# Patient Record
Sex: FEMALE | Race: OTHER | HISPANIC OR LATINO | Employment: UNEMPLOYED | URBAN - METROPOLITAN AREA
[De-identification: names, ages, dates, MRNs, and addresses within clinical notes are randomized per-mention and may not be internally consistent; named-entity substitution may affect disease eponyms.]

---

## 2024-02-06 ENCOUNTER — HOSPITAL ENCOUNTER (EMERGENCY)
Facility: HOSPITAL | Age: 24
Discharge: HOME | End: 2024-02-06
Attending: FAMILY MEDICINE
Payer: MEDICAID

## 2024-02-06 VITALS
HEART RATE: 81 BPM | TEMPERATURE: 98 F | SYSTOLIC BLOOD PRESSURE: 141 MMHG | DIASTOLIC BLOOD PRESSURE: 99 MMHG | OXYGEN SATURATION: 100 % | RESPIRATION RATE: 17 BRPM | WEIGHT: 189.81 LBS | BODY MASS INDEX: 38.27 KG/M2 | HEIGHT: 59 IN

## 2024-02-06 DIAGNOSIS — O26.899 ABDOMINAL PAIN AFFECTING PREGNANCY: Primary | ICD-10-CM

## 2024-02-06 DIAGNOSIS — R10.9 ABDOMINAL PAIN AFFECTING PREGNANCY: Primary | ICD-10-CM

## 2024-02-06 LAB
ALBUMIN SERPL-MCNC: 3.4 G/DL (ref 3.5–5)
ALBUMIN/GLOB SERPL: 0.9 RATIO (ref 1.1–2)
ALP SERPL-CCNC: 88 UNIT/L (ref 40–150)
ALT SERPL-CCNC: 45 UNIT/L (ref 0–55)
APPEARANCE UR: CLEAR
AST SERPL-CCNC: 30 UNIT/L (ref 5–34)
B-HCG FREE SERPL-ACNC: ABNORMAL MIU/ML
BACTERIA #/AREA URNS AUTO: ABNORMAL /HPF
BASOPHILS # BLD AUTO: 0.03 X10(3)/MCL
BASOPHILS NFR BLD AUTO: 0.3 %
BILIRUB SERPL-MCNC: 0.2 MG/DL
BILIRUB UR QL STRIP.AUTO: NEGATIVE
BUN SERPL-MCNC: 12 MG/DL (ref 7–18.7)
C TRACH DNA SPEC QL NAA+PROBE: NOT DETECTED
CALCIUM SERPL-MCNC: 8.8 MG/DL (ref 8.4–10.2)
CHLORIDE SERPL-SCNC: 109 MMOL/L (ref 98–107)
CO2 SERPL-SCNC: 20 MMOL/L (ref 22–29)
COLOR UR AUTO: ABNORMAL
CREAT SERPL-MCNC: 0.6 MG/DL (ref 0.55–1.02)
EOSINOPHIL # BLD AUTO: 0.12 X10(3)/MCL (ref 0–0.9)
EOSINOPHIL NFR BLD AUTO: 1.3 %
ERYTHROCYTE [DISTWIDTH] IN BLOOD BY AUTOMATED COUNT: 11.9 % (ref 11.5–17)
GFR SERPLBLD CREATININE-BSD FMLA CKD-EPI: >60 MLS/MIN/1.73/M2
GLOBULIN SER-MCNC: 3.9 GM/DL (ref 2.4–3.5)
GLUCOSE SERPL-MCNC: 96 MG/DL (ref 74–100)
GLUCOSE UR QL STRIP.AUTO: NORMAL
HCT VFR BLD AUTO: 34.4 % (ref 37–47)
HGB BLD-MCNC: 12.2 G/DL (ref 12–16)
HOLD SPECIMEN: NORMAL
HOLD SPECIMEN: NORMAL
HYALINE CASTS #/AREA URNS LPF: ABNORMAL /LPF
IMM GRANULOCYTES # BLD AUTO: 0.03 X10(3)/MCL (ref 0–0.04)
IMM GRANULOCYTES NFR BLD AUTO: 0.3 %
KETONES UR QL STRIP.AUTO: NEGATIVE
LEUKOCYTE ESTERASE UR QL STRIP.AUTO: NEGATIVE
LYMPHOCYTES # BLD AUTO: 2.49 X10(3)/MCL (ref 0.6–4.6)
LYMPHOCYTES NFR BLD AUTO: 27.7 %
MCH RBC QN AUTO: 30.2 PG (ref 27–31)
MCHC RBC AUTO-ENTMCNC: 35.5 G/DL (ref 33–36)
MCV RBC AUTO: 85.1 FL (ref 80–94)
MONOCYTES # BLD AUTO: 0.75 X10(3)/MCL (ref 0.1–1.3)
MONOCYTES NFR BLD AUTO: 8.3 %
N GONORRHOEA DNA SPEC QL NAA+PROBE: NOT DETECTED
NEUTROPHILS # BLD AUTO: 5.57 X10(3)/MCL (ref 2.1–9.2)
NEUTROPHILS NFR BLD AUTO: 62.1 %
NITRITE UR QL STRIP.AUTO: NEGATIVE
NRBC BLD AUTO-RTO: 0 %
PH UR STRIP.AUTO: 6 [PH]
PLATELET # BLD AUTO: 326 X10(3)/MCL (ref 130–400)
PMV BLD AUTO: 9 FL (ref 7.4–10.4)
POTASSIUM SERPL-SCNC: 3.8 MMOL/L (ref 3.5–5.1)
PROT SERPL-MCNC: 7.3 GM/DL (ref 6.4–8.3)
PROT UR QL STRIP.AUTO: NEGATIVE
RBC # BLD AUTO: 4.04 X10(6)/MCL (ref 4.2–5.4)
RBC #/AREA URNS AUTO: ABNORMAL /HPF
RBC UR QL AUTO: NEGATIVE
SODIUM SERPL-SCNC: 138 MMOL/L (ref 136–145)
SOURCE (OHS): NORMAL
SP GR UR STRIP.AUTO: 1.02 (ref 1–1.03)
SQUAMOUS #/AREA URNS LPF: ABNORMAL /HPF
UROBILINOGEN UR STRIP-ACNC: NORMAL
WBC # SPEC AUTO: 8.99 X10(3)/MCL (ref 4.5–11.5)
WBC #/AREA URNS AUTO: ABNORMAL /HPF

## 2024-02-06 PROCEDURE — 99284 EMERGENCY DEPT VISIT MOD MDM: CPT | Mod: 25

## 2024-02-06 PROCEDURE — 80053 COMPREHEN METABOLIC PANEL: CPT | Performed by: NURSE PRACTITIONER

## 2024-02-06 PROCEDURE — 81001 URINALYSIS AUTO W/SCOPE: CPT | Performed by: NURSE PRACTITIONER

## 2024-02-06 PROCEDURE — 87491 CHLMYD TRACH DNA AMP PROBE: CPT | Performed by: NURSE PRACTITIONER

## 2024-02-06 PROCEDURE — 85025 COMPLETE CBC W/AUTO DIFF WBC: CPT | Performed by: NURSE PRACTITIONER

## 2024-02-06 PROCEDURE — 84702 CHORIONIC GONADOTROPIN TEST: CPT | Performed by: NURSE PRACTITIONER

## 2024-02-06 RX ORDER — PYRIDOXINE HCL (VITAMIN B6) 25 MG
25 TABLET ORAL EVERY 8 HOURS PRN
Qty: 12 TABLET | Refills: 0 | Status: ON HOLD | OUTPATIENT
Start: 2024-02-06 | End: 2024-05-15 | Stop reason: HOSPADM

## 2024-02-06 RX ORDER — NIFEDIPINE 30 MG/1
30 TABLET, EXTENDED RELEASE ORAL DAILY
Status: ON HOLD | COMMUNITY
End: 2024-05-15 | Stop reason: HOSPADM

## 2024-02-07 NOTE — ED PROVIDER NOTES
Encounter Date: 2/6/2024       History     Chief Complaint   Patient presents with    abdominal pain and back pain during pregnancy     Patient  is 11 weeks  pregnant  for  twins.  Having  back pain and abdominal pain that started  this afternoon.  Does have a history of HTN and is on procardia. Does have a 5yr.old     The patient presents with occasional mild left mid abdominal pain for the last couple days. She also reports occasional mild nausea. She reports that she is pregnant with twins with her LMP in August of last year. She was seen in [place] with an ultrasound but has moved to [place] and does not have an OB provider. She denies vaginal bleeding, dysuria, fever, and chills. She is taking procardia prescribed in [place] for HTN. Video  used for encounter.      Review of patient's allergies indicates:  No Known Allergies  History reviewed. No pertinent past medical history.  History reviewed. No pertinent surgical history.  History reviewed. No pertinent family history.     Review of Systems   Constitutional:  Negative for fever.   HENT:  Negative for sore throat.    Respiratory:  Negative for shortness of breath.    Cardiovascular:  Negative for chest pain.   Gastrointestinal:  Positive for abdominal pain and nausea.   Genitourinary:  Negative for dysuria.   Musculoskeletal:  Negative for back pain.   Skin:  Negative for rash.   Neurological:  Negative for weakness.   Hematological:  Does not bruise/bleed easily.   All other systems reviewed and are negative.      Physical Exam     Initial Vitals [02/06/24 1937]   BP Pulse Resp Temp SpO2   (!) 148/97 86 20 97.9 °F (36.6 °C) 100 %      MAP       --         Physical Exam    Nursing note and vitals reviewed.  Constitutional: She appears well-developed and well-nourished.   HENT:   Head: Normocephalic and atraumatic.   Neck: Neck supple.   Normal range of motion.  Cardiovascular:  Normal rate, regular rhythm, normal heart sounds and intact  distal pulses.           Pulmonary/Chest: Effort normal and breath sounds normal.   Abdominal: Abdomen is soft and flat. Bowel sounds are normal. There is no abdominal tenderness.   Musculoskeletal:         General: Normal range of motion.      Cervical back: Normal range of motion and neck supple.     Neurological: She is alert. She has normal strength.   Skin: Skin is warm and dry.   Psychiatric: She has a normal mood and affect.         ED Course   Procedures  Labs Reviewed   COMPREHENSIVE METABOLIC PANEL - Abnormal; Notable for the following components:       Result Value    Chloride 109 (*)     Carbon Dioxide 20 (*)     Albumin Level 3.4 (*)     Globulin 3.9 (*)     Albumin/Globulin Ratio 0.9 (*)     All other components within normal limits   URINALYSIS, REFLEX TO URINE CULTURE - Abnormal; Notable for the following components:    Bacteria, UA Occ (*)     Squamous Epithelial Cells, UA Trace (*)     All other components within normal limits   HCG, QUANTITATIVE - Abnormal; Notable for the following components:    Beta Human Chorionic Gonadotropin Quantitative 164,388.82 (*)     All other components within normal limits   CBC WITH DIFFERENTIAL - Abnormal; Notable for the following components:    RBC 4.04 (*)     Hct 34.4 (*)     All other components within normal limits   CHLAMYDIA/GONORRHOEAE(GC), PCR    Narrative:     The Xpert CT/NG test, performed on the GeneXpert system is a qualitative in vitro real-time polymerase chain reaction (PCR) test for the automated detected and differentiation for genomic DNA from Chlamydia trachomatis (CT) and/or Neisseria gonorrhoeae (NG).   CBC W/ AUTO DIFFERENTIAL    Narrative:     The following orders were created for panel order CBC auto differential.  Procedure                               Abnormality         Status                     ---------                               -----------         ------                     CBC with Differential[[id number]]       Abnormal             Final result                 Please view results for these tests on the individual orders.   EXTRA TUBES    Narrative:     The following orders were created for panel order EXTRA TUBES.  Procedure                               Abnormality         Status                     ---------                               -----------         ------                     Light Blue Top Hold[[id number]]                             Final result               Gold Top Hold[[id number]]                                   Final result                 Please view results for these tests on the individual orders.   LIGHT BLUE TOP HOLD   GOLD TOP HOLD          Imaging Results              US OB <14 Wks, TransAbd,Twin Gestation (xpd) (Final result)  Result time 02/06/24 21:50:02   Procedure changed from US OB <14 Wks TransAbd & TransVag, Single Gestation (XPD)     Final result by Bret Lowe MD (02/06/24 21:50:02)                   Impression:      Twin viable intrauterine pregnancy.      Electronically signed by: Bret Lowe  Date:    02/06/2024  Time:    21:50               Narrative:    EXAMINATION:  US OB <14 WEEKS TRANSABDOM, TWIN GESTATION (XPD)    CLINICAL HISTORY:  abdominal pain;    TECHNIQUE:  Multiple real-time images were performed pelvis in various planes by the sonographer.    COMPARISON:  None available    FINDINGS:  Uterus measures 11.2 x 5.6 x 7.5 cm.  There is twin viable intrauterine pregnancy.    Baby A crown-rump length is 4.1 cm consistent with the mean age of 11 weeks and heart rate of 154 beats per minute.    Baby B crown-rump length is 4.1 cm again consistent with a mean age of 11 weeks and heart rate of 171 beats per minute.    No subchorionic hemorrhage identified.    Bilateral ovaries could not be visualized.  No free fluid within the pelvis                                       Medications - No data to display  Medical Decision Making  The patient presents with occasional mild left mid abdominal  pain for the last couple days. She also reports occasional mild nausea. She reports that she is pregnant with twins with her LMP in August of last year. She was seen in [place] with an ultrasound but has moved to [place] and does not have an OB provider. She denies vaginal bleeding, dysuria, fever, and chills. She is taking procardia prescribed in [place] for HTN. Video  used for encounter.      10:49 PM DISPOSITION: The patient is resting comfortably in no acute distress.  She is hemodynamically stable and is without objective evidence for acute process requiring urgent intervention or hospitalization. I provided counseling to patient with regard to condition, the treatment plan, specific conditions for return, and the importance of follow up. Detailed written and verbal instructions provided to patient and she expressed a verbal understanding of the discharge instructions and overall management plan. Reiterated the importance of medication administration and safety and advised patient to follow up with primary care provider in 3-5 days or sooner if needed.  Answered questions at this time. The patient is stable for discharge.       Amount and/or Complexity of Data Reviewed  Labs: ordered.  Radiology: ordered. Decision-making details documented in ED Course.               ED Course as of 02/07/24 0019 Tue Feb 06, 2024   2156  OB <14 Wks, TransAbd,Twin Gestation (xpd)  Impression:     Twin viable intrauterine pregnancy.   [RB]   2244 Beta HCG Quant(!): 164,388.82 [RB]      ED Course User Index  [RB] Balwinder Zeng ACNP                           Clinical Impression:  Final diagnoses:  [O26.899, R10.9] Abdominal pain affecting pregnancy (Primary)          ED Disposition Condition    Discharge Stable          ED Prescriptions       Medication Sig Dispense Start Date End Date Auth. Provider    pyridoxine, vitamin B6, (B-6) 25 MG Tab Take 1 tablet (25 mg total) by mouth every 8 (eight) hours as  needed (nausea). 12 tablet 2/6/2024 -- Balwinder Zeng ACNP          Follow-up Information       Follow up With Specialties Details Why Contact Info    referral sent to Family Medicine OB clinic        [facility] Emergency Medicine  If symptoms worsen [address] [id number] [phone]             Balwinder Zeng ACNP  02/07/24 0019

## 2024-02-07 NOTE — ED NOTES
Pt given discharge instructions pt instructed to return to er if any complications or any blurred vision, headache, dizziness, or chest pain   Alert-The patient is alert, awake and responds to voice. The patient is oriented to time, place, and person. The triage nurse is able to obtain subjective information.

## 2024-03-12 ENCOUNTER — HOSPITAL ENCOUNTER (OUTPATIENT)
Dept: RADIOLOGY | Facility: HOSPITAL | Age: 24
Discharge: HOME | End: 2024-03-12
Attending: OBSTETRICS & GYNECOLOGY
Payer: MEDICAID

## 2024-03-12 ENCOUNTER — OFFICE VISIT (OUTPATIENT)
Dept: FAMILY MEDICINE | Facility: CLINIC | Age: 24
End: 2024-03-12
Payer: MEDICAID

## 2024-03-12 VITALS
HEART RATE: 88 BPM | WEIGHT: 186.63 LBS | BODY MASS INDEX: 37.62 KG/M2 | RESPIRATION RATE: 18 BRPM | SYSTOLIC BLOOD PRESSURE: 129 MMHG | OXYGEN SATURATION: 100 % | DIASTOLIC BLOOD PRESSURE: 89 MMHG | HEIGHT: 59 IN | TEMPERATURE: 98 F

## 2024-03-12 DIAGNOSIS — O26.899 ABDOMINAL PAIN AFFECTING PREGNANCY: Primary | ICD-10-CM

## 2024-03-12 DIAGNOSIS — Z3A.16 16 WEEKS GESTATION OF PREGNANCY: ICD-10-CM

## 2024-03-12 DIAGNOSIS — Z34.90 PREGNANCY: ICD-10-CM

## 2024-03-12 DIAGNOSIS — O02.1 FETAL DEMISE BEFORE 20 WEEKS WITH RETENTION OF DEAD FETUS: ICD-10-CM

## 2024-03-12 DIAGNOSIS — R10.9 ABDOMINAL PAIN AFFECTING PREGNANCY: Primary | ICD-10-CM

## 2024-03-12 LAB
APPEARANCE UR: CLEAR
BACTERIA #/AREA URNS AUTO: ABNORMAL /HPF
BASOPHILS # BLD AUTO: 0.03 X10(3)/MCL
BASOPHILS NFR BLD AUTO: 0.4 %
BILIRUB SERPL-MCNC: NORMAL MG/DL
BILIRUB UR QL STRIP.AUTO: NEGATIVE
BLOOD URINE, POC: NORMAL
C TRACH DNA SPEC QL NAA+PROBE: NOT DETECTED
CLARITY, POC UA: CLEAR
COLOR UR AUTO: ABNORMAL
COLOR, POC UA: YELLOW
EOSINOPHIL # BLD AUTO: 0.11 X10(3)/MCL (ref 0–0.9)
EOSINOPHIL NFR BLD AUTO: 1.3 %
ERYTHROCYTE [DISTWIDTH] IN BLOOD BY AUTOMATED COUNT: 12.6 % (ref 11.5–17)
GLUCOSE UR QL STRIP.AUTO: NORMAL
GLUCOSE UR QL STRIP: NORMAL
GROUP & RH: NORMAL
HBV SURFACE AG SERPL QL IA: NONREACTIVE
HCT VFR BLD AUTO: 34 % (ref 37–47)
HCV AB SERPL QL IA: NONREACTIVE
HGB BLD-MCNC: 11.8 G/DL (ref 12–16)
HIV 1+2 AB+HIV1 P24 AG SERPL QL IA: NONREACTIVE
HYALINE CASTS #/AREA URNS LPF: ABNORMAL /LPF
IMM GRANULOCYTES # BLD AUTO: 0.03 X10(3)/MCL (ref 0–0.04)
IMM GRANULOCYTES NFR BLD AUTO: 0.4 %
INDIRECT COOMBS: NORMAL
KETONES UR QL STRIP.AUTO: NEGATIVE
KETONES UR QL STRIP: NORMAL
LEUKOCYTE ESTERASE UR QL STRIP.AUTO: NEGATIVE
LEUKOCYTE ESTERASE URINE, POC: NORMAL
LYMPHOCYTES # BLD AUTO: 1.84 X10(3)/MCL (ref 0.6–4.6)
LYMPHOCYTES NFR BLD AUTO: 22 %
MCH RBC QN AUTO: 29.9 PG (ref 27–31)
MCHC RBC AUTO-ENTMCNC: 34.7 G/DL (ref 33–36)
MCV RBC AUTO: 86.3 FL (ref 80–94)
MONOCYTES # BLD AUTO: 0.53 X10(3)/MCL (ref 0.1–1.3)
MONOCYTES NFR BLD AUTO: 6.3 %
MUCOUS THREADS URNS QL MICRO: ABNORMAL /LPF
N GONORRHOEA DNA SPEC QL NAA+PROBE: NOT DETECTED
NEUTROPHILS # BLD AUTO: 5.81 X10(3)/MCL (ref 2.1–9.2)
NEUTROPHILS NFR BLD AUTO: 69.6 %
NITRITE UR QL STRIP.AUTO: NEGATIVE
NITRITE, POC UA: NORMAL
NRBC BLD AUTO-RTO: 0 %
PH UR STRIP.AUTO: 7 [PH]
PH, POC UA: 7
PLATELET # BLD AUTO: 386 X10(3)/MCL (ref 130–400)
PMV BLD AUTO: 9.4 FL (ref 7.4–10.4)
PROT UR QL STRIP.AUTO: NEGATIVE
PROTEIN, POC: NORMAL
RBC # BLD AUTO: 3.94 X10(6)/MCL (ref 4.2–5.4)
RBC #/AREA URNS AUTO: ABNORMAL /HPF
RBC UR QL AUTO: NEGATIVE
SOURCE (OHS): NORMAL
SP GR UR STRIP.AUTO: 1.01 (ref 1–1.03)
SPECIFIC GRAVITY, POC UA: 1.01
SPECIMEN OUTDATE: NORMAL
SQUAMOUS #/AREA URNS LPF: ABNORMAL /HPF
T PALLIDUM AB SER QL: NONREACTIVE
UROBILINOGEN UR STRIP-ACNC: NORMAL
UROBILINOGEN, POC UA: 0.2
WBC # SPEC AUTO: 8.35 X10(3)/MCL (ref 4.5–11.5)
WBC #/AREA URNS AUTO: ABNORMAL /HPF

## 2024-03-12 PROCEDURE — 85025 COMPLETE CBC W/AUTO DIFF WBC: CPT

## 2024-03-12 PROCEDURE — 87086 URINE CULTURE/COLONY COUNT: CPT

## 2024-03-12 PROCEDURE — 86901 BLOOD TYPING SEROLOGIC RH(D): CPT

## 2024-03-12 PROCEDURE — 76810 OB US >/= 14 WKS ADDL FETUS: CPT | Mod: TC

## 2024-03-12 PROCEDURE — 87491 CHLMYD TRACH DNA AMP PROBE: CPT

## 2024-03-12 PROCEDURE — 81511 FTL CGEN ABNOR FOUR ANAL: CPT

## 2024-03-12 PROCEDURE — 81002 URINALYSIS NONAUTO W/O SCOPE: CPT | Mod: 59,PBBFAC

## 2024-03-12 PROCEDURE — 86780 TREPONEMA PALLIDUM: CPT

## 2024-03-12 PROCEDURE — 81001 URINALYSIS AUTO W/SCOPE: CPT

## 2024-03-12 PROCEDURE — 86762 RUBELLA ANTIBODY: CPT

## 2024-03-12 PROCEDURE — 86803 HEPATITIS C AB TEST: CPT

## 2024-03-12 PROCEDURE — 36415 COLL VENOUS BLD VENIPUNCTURE: CPT

## 2024-03-12 PROCEDURE — 85660 RBC SICKLE CELL TEST: CPT

## 2024-03-12 PROCEDURE — 88174 CYTOPATH C/V AUTO IN FLUID: CPT

## 2024-03-12 PROCEDURE — 99215 OFFICE O/P EST HI 40 MIN: CPT | Mod: PBBFAC,25

## 2024-03-12 PROCEDURE — 87389 HIV-1 AG W/HIV-1&-2 AB AG IA: CPT

## 2024-03-12 PROCEDURE — 87340 HEPATITIS B SURFACE AG IA: CPT

## 2024-03-12 PROCEDURE — 86787 VARICELLA-ZOSTER ANTIBODY: CPT

## 2024-03-12 NOTE — PATIENT INSTRUCTIONS
Well Child Exam    About this topic  A well child exam is a visit with your child's doctor to check your child's health. The doctor will check your child's growth, progress, and shot record. It is also a time for you to ask your child's doctor any questions you have about your child's health. Your child will have a full exam during the office visit. Other things that are sometimes checked are hearing, eyesight, and urine or blood tests. The doctor may give shots during your child's well visit.    General    Getting Ready for a Well Child Exam    A well child exam is a good time for you to talk with your child's doctor about any of these topics:    Eating habits or diet    How your child acts    Sleep issues    Growth    Safety    Vaccines    Toilet training    Teen years    How your child is doing in school or any learning concerns    Home life    You may want to make a written list of the things you want to talk about with your child's doctor. Be sure to bring your list of questions to your child's well visit. You may also want to do some research on your own before your office visit by reading books or looking at Web sites. Other family members, child caregivers, and grandparents may be able to help you too. Your child's doctor may ask also you about your family's health history or if your child is around anyone who smokes.    The Exam    The doctor measures your child's weight, height, and sometimes head size or body mass index (BMI). The doctor plots these numbers on a growth curve. The growth curve gives a picture of your baby's growth at each visit. The doctor may check your child's temperature, blood pressure, breathing, and heart rate. The doctor may listen to your child's heart, lungs, and belly. Your doctor will do a full exam of your child from the head to the toes.    Growth and Development Questions    Your doctor will ask you about your child's progress. The doctor will focus on the skills that are  likely to happen at your child's age. Some of these are motor skills like rolling over, walking, and running, while others are social skills, or how your child interacts with other people. Your child's doctor will also ask you how your child is doing in school.    Help for Parents    Your doctor will talk with you about any concerns you have about your child during this visit. The doctor may also talk with you about:    Getting family help or other support    Ways to help your child's brain growth    How your child plays and acts with others    Ways to help your child exercise    Safety    Eating habits    Vaccines    Quitting smoking    Help if you have a low mood after having a baby    Shots or Vaccines    It is important for your child to get shots on time. This protects from very serious illnesses like pertussis, measles, or some kinds of pneumonia. Sometimes, your child may need more than one dose of vaccine. The vaccines used today are safer than ever. Talk to your doctor if you have any questions or concerns about giving your child vaccines.    Well Child Exam Schedule    The American Academy of Pediatrics (AAP) suggests this plan for well child visits:    [place] (3 to 5 days old)    1 month old    2 months old    4 months old    6 months old    9 months old    12 months old    15 months old    18 months old    2 years old    30 months old    3 years old    4 years old    Once each year until age 21    Well child exams are very important. Since your child is healthy at this visit and it is scheduled ahead of time, you can think about things you want to ask your child's doctor. Be sure to follow the above plan for well child visits as well as any other visits your child's doctor suggests.    Where can I learn more?    Centers for Disease Control and Prevention    http://www.cdc.gov/vaccines     Healthy  Children    https://www.healthychildren.org/English/family-life/health-management/Pages/Well-Child-Care-A-Check-Up-for-Success.aspx    Disclaimer.  This generalized information is a limited summary of diagnosis, treatment, and/or medication information. It is not meant to be comprehensive and should be used as a tool to help the user understand and/or assess potential diagnostic and treatment options. It does NOT include all information about conditions, treatments, medications, side effects, or risks that may apply to a specific patient. It is not intended to be medical advice or a substitute for the medical advice, diagnosis, or treatment of a health care provider based on the health care provider's examination and assessment of a patients specific and unique circumstances. Patients must speak with a health care provider for complete information about their health, medical questions, and treatment options, including any risks or benefits regarding use of medications. This information does not endorse any treatments or medications as safe, effective, or approved for treating a specific patient. [employer] and its affiliates disclaim any warranty or liability relating to this information or the use thereof. The use of this information is governed by the Terms of Use, available at Terms of Use. ©2022 [employer] and its affiliates and/or licensors. All rights reserved.

## 2024-03-13 LAB — HGB S BLD QL SOLY: NEGATIVE

## 2024-03-13 NOTE — PROGRESS NOTES
Physically present during transvaginal ultrasound which revealed fetal demise.  Questionable structural abnormalities on ultrasound we will review previous images.  Had a long discussion with the patient and father of the babies via .  All questions were answered and discussed referral to Maternal-Fetal Medicine.      I have personally reviewed the review of systems (ROS) and past, family and social histories (PFSH) documented above by the resident.  I have reviewed the care furnished by the resident during the encounter, including a review of the patient's medical history, the resident's findings on physical examination, diagnosis, and the treatment plan.  I participated in the management of the patient and was immediately available throughout the encounter.   I was physically present during all key portions of the service(s) provided with the resident.  Services were furnished in a primary care center located in the outpatient department of a [facility].

## 2024-03-14 ENCOUNTER — OFFICE VISIT (OUTPATIENT)
Dept: MATERNAL FETAL MEDICINE | Facility: CLINIC | Age: 24
End: 2024-03-14
Payer: MEDICAID

## 2024-03-14 ENCOUNTER — PROCEDURE VISIT (OUTPATIENT)
Dept: MATERNAL FETAL MEDICINE | Facility: CLINIC | Age: 24
End: 2024-03-14
Payer: MEDICAID

## 2024-03-14 VITALS
HEART RATE: 97 BPM | BODY MASS INDEX: 37.68 KG/M2 | DIASTOLIC BLOOD PRESSURE: 89 MMHG | WEIGHT: 186.94 LBS | SYSTOLIC BLOOD PRESSURE: 124 MMHG | HEIGHT: 59 IN

## 2024-03-14 DIAGNOSIS — O41.02X2: ICD-10-CM

## 2024-03-14 DIAGNOSIS — O10.919 CHRONIC HYPERTENSION AFFECTING PREGNANCY: ICD-10-CM

## 2024-03-14 DIAGNOSIS — Z3A.16 16 WEEKS GESTATION OF PREGNANCY: ICD-10-CM

## 2024-03-14 DIAGNOSIS — O36.4XX0 FETAL DEMISE BEFORE 22 WEEKS WITH RETENTION OF DEAD FETUS: Primary | ICD-10-CM

## 2024-03-14 DIAGNOSIS — Z36.89 ENCOUNTER FOR FETAL ANATOMIC SURVEY: Primary | ICD-10-CM

## 2024-03-14 DIAGNOSIS — O30.002 TWIN GESTATION WITH UNKNOWN NUMBER OF PLACENTAS AND AMNIOTIC SACS IN SECOND TRIMESTER: ICD-10-CM

## 2024-03-14 DIAGNOSIS — O99.212 OTHER OBESITY DUE TO EXCESS CALORIES AFFECTING PREGNANCY IN SECOND TRIMESTER: ICD-10-CM

## 2024-03-14 DIAGNOSIS — O36.4XX0 FETAL DEMISE BEFORE 22 WEEKS WITH RETENTION OF DEAD FETUS: ICD-10-CM

## 2024-03-14 DIAGNOSIS — E66.09 OTHER OBESITY DUE TO EXCESS CALORIES AFFECTING PREGNANCY IN SECOND TRIMESTER: ICD-10-CM

## 2024-03-14 PROBLEM — O30.032 MONOCHORIONIC DIAMNIOTIC TWIN GESTATION IN SECOND TRIMESTER: Status: ACTIVE | Noted: 2024-03-14

## 2024-03-14 LAB
BACTERIA UR CULT: NO GROWTH
RUBV IGG SERPL IA-ACNC: 4.1
RUBV IGG SERPL QL IA: POSITIVE
VZV IGG SER IA-ACNC: 2.3
VZV IGG SER QL IA: POSITIVE

## 2024-03-14 PROCEDURE — 1159F MED LIST DOCD IN RCRD: CPT | Mod: CPTII,S$GLB,, | Performed by: OBSTETRICS & GYNECOLOGY

## 2024-03-14 PROCEDURE — 1160F RVW MEDS BY RX/DR IN RCRD: CPT | Mod: CPTII,S$GLB,, | Performed by: OBSTETRICS & GYNECOLOGY

## 2024-03-14 PROCEDURE — 76812 OB US DETAILED ADDL FETUS: CPT | Mod: S$GLB,,, | Performed by: OBSTETRICS & GYNECOLOGY

## 2024-03-14 PROCEDURE — 3008F BODY MASS INDEX DOCD: CPT | Mod: CPTII,S$GLB,, | Performed by: OBSTETRICS & GYNECOLOGY

## 2024-03-14 PROCEDURE — 3079F DIAST BP 80-89 MM HG: CPT | Mod: CPTII,S$GLB,, | Performed by: OBSTETRICS & GYNECOLOGY

## 2024-03-14 PROCEDURE — 76811 OB US DETAILED SNGL FETUS: CPT | Mod: S$GLB,,, | Performed by: OBSTETRICS & GYNECOLOGY

## 2024-03-14 PROCEDURE — 99205 OFFICE O/P NEW HI 60 MIN: CPT | Mod: TH,S$GLB,, | Performed by: OBSTETRICS & GYNECOLOGY

## 2024-03-14 PROCEDURE — 3074F SYST BP LT 130 MM HG: CPT | Mod: CPTII,S$GLB,, | Performed by: OBSTETRICS & GYNECOLOGY
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
## 2024-03-14 NOTE — ASSESSMENT & PLAN NOTE
We discussed monochorionic twinning and reviewed the types of placentation seen and their frequency. I counseled her on the increased incidence of preeclampsia/gestational hypertension, gestational diabetes, fetal growth restriction, anemia, congenital anomalies, need for antepartum hospitalization,  labor/PPROM, stillbirth, and risk of postpartum hemorrhage that can occur in twin pregnancies. It was explained that all monochorionic twins, have a single placenta and that this puts them at risk for twin transfusion syndrome (TTTS). We discussed twin-transfusion syndrome which is seen in ~ 10-15% of monochorionic twins due to unbalanced vascular communications within the placenta and can result in discordant fetal growth and fluid volume. We discussed that in its severe form, it can potentially result in  delivery or fetal morbidity or mortality due to poor placental function of the smaller twin and volume overload and cardiomyopathy of the larger twin. Treatment may consist of observation or laser photocoagulation of communicating vessels depending upon the circumstances and timing of the presentation. I reviewed with the patient the need for fetal ultrasound assessment for TTTS surveillance every two weeks starting at 16 weeks.     3/14/24- IUFD of twin A. Early onset oligohydramnios on twin B (16w5d)    Recommendations:   Fetal ultrasound assessment every two weeks, starting at 16 weeks until delivery for TTTS surveillance (scheduled with M)  If high suspicion for evolving TTTS, increase ultrasound surveillance to once or twice weekly  Transvaginal cervical length screening at time of targeted anatomy ultrasound w/ MFM  Detailed anatomy surveys at 18-20 weeks (scheduled with MFM)  Fetal growth ultrasounds every 3-4 weeks starting at 23-24 weeks (scheduled with M)  Fetal echocardiogram at 22-24 weeks ([facility] [place] will arrange)  Low-dose aspirin daily (81 mg) beginning at 12-13 weeks to reduce the risk of  preeclampsia  Folic acid 1 mg daily and ferrous sulfate 325 mg daily  Increase daily dietary intake by approximately 300 kcal above that for a clay pregnancy, or 600 kcal over that of a nonpregnant woman and monitor weight gain

## 2024-03-14 NOTE — ASSESSMENT & PLAN NOTE
Due to early-onset significant oligohydramnios of twin B, imaging is severely limited.  It is difficult to determine chorionicity of twins.  Additionally, anatomical imaging is difficult to obtain.  With known IUFD of twin a, and early-onset severe oligohydramnios of twin B, we do not anticipate this will be a viable pregnancy.

## 2024-03-14 NOTE — ASSESSMENT & PLAN NOTE
3/12/24- At [facility], ultrasound revealed IUFD of one twin with suspected birth defect in the living twin. She was referred urgently to our clinic.   3/14/24- Ultrasound evaluation confirmed IUFD of twin A with measurements consistent with a 13w6d fetus. Twin B has oligohydramnios with severely limited imaging due to low amniotic fluid. Please see separate documentation.

## 2024-03-14 NOTE — ASSESSMENT & PLAN NOTE
There are  risks associated with obesity which include and increased risk of hypertension, preeclampsia, gestational diabetes, venous thromboembolic disease,  delivery, macrosomia (with resultant shoulder dystocia, obstetric sphincter injury), IUFD, longer first stage of labor, decreased TOLAC success rate, emergent & scheduled  & complications of  (prolonged OR time, delayed delivery, excessive EBL, infection, wound separation/infection, higher spinal failure rate, more difficult intubation).  Obesity is also associated with fetal anomalies, specifically neural tube defects.  Studies have shown that the rate of complication increases with rising BMI and thus pregnancies with maternal morbid obesity are at increased risk.      BMI 38    Recommendations:  TWG goal is 11-20 lbs  Screen for signs/symptoms of obstructive sleep apnea  Nutritionist consult offered (this is to be ordered by primary OB provider)  Consider early 1 hr GCT (between 14-16 weeks gestation); repeat at 24-28 weeks gestation  Start low dose aspirin 81 mg daily at 12-16 weeks for preeclampsia risk reduction  Weekly  testing at 32 weeks if pre-pregnancy BMI >= 45  Lovenox 40 mg BID for VTE prophylaxis while admitted to the hospital (antepartum or postpartum) if BMI >= 40.   Encouraged breastfeeding  Postpartum lifestyle modifications & weight loss

## 2024-03-14 NOTE — ASSESSMENT & PLAN NOTE
Today I counseled the patient on maternal/fetal risks associated with CHTN during pregnancy. Risks include but not limited to fetal growth restriction, miscarriage, abruption, maternal end organ disease (renal failure, MI, and stroke),  delivery, development of superimposed preeclampsia, and eclampsia. She was counseled on the recommendations for blood pressure control, serial ultrasound for fetal growth assessment and  testing, and timing of delivery. I also counseled her on the recommendation for aspirin 81 mg daily which may decrease her risk of developing superimposed preeclampsia.     Recommendations (Please refer to [facility] guidelines):  -Initiate aspirin 81 mg daily at 12-16 weeks gestation for preeclampsia risk reduction  -Continue current medications: Nifedipine 30mg XL daily  -Baseline evaluation with primary OB:   24-hour urine protein or baseline P/C ratio, CMP, and CBC (needs UP:C)  Maternal EKG  Maternal ophthalmic evaluation  Maternal echocardiogram if HTN has been long-standing or EKG is abnormal  -Serial fetal growth ultrasounds every 4-6 weeks, beginning at 26-28 weeks.   -Continued close observation of patient's blood pressures. Avoid hypotension as this has been associated with uteroplacental insufficiency.  -In conjunction with the CHAP study recommendation for BP control: If BP is persistently ?140/90 antihypertensive medication is recommended with goal BP of 120-140/80-90.

## 2024-03-14 NOTE — ASSESSMENT & PLAN NOTE
Early onset oligohydramnios is identified today (16w2d) on twin B. Due to low amniotic fluid, imaging is severely limited.

## 2024-03-15 ENCOUNTER — HOSPITAL ENCOUNTER (EMERGENCY)
Facility: HOSPITAL | Age: 24
Discharge: HOME | End: 2024-03-15
Attending: STUDENT IN AN ORGANIZED HEALTH CARE EDUCATION/TRAINING PROGRAM
Payer: MEDICAID

## 2024-03-15 VITALS
HEIGHT: 61 IN | RESPIRATION RATE: 22 BRPM | TEMPERATURE: 98 F | BODY MASS INDEX: 35.09 KG/M2 | HEART RATE: 86 BPM | WEIGHT: 185.88 LBS | DIASTOLIC BLOOD PRESSURE: 89 MMHG | SYSTOLIC BLOOD PRESSURE: 131 MMHG | OXYGEN SATURATION: 100 %

## 2024-03-15 DIAGNOSIS — N93.9 VAGINAL BLEEDING: ICD-10-CM

## 2024-03-15 DIAGNOSIS — O46.90 VAGINAL BLEEDING IN PREGNANCY: Primary | ICD-10-CM

## 2024-03-15 LAB
ALBUMIN SERPL-MCNC: 2.8 G/DL (ref 3.5–5)
ALBUMIN/GLOB SERPL: 0.7 RATIO (ref 1.1–2)
ALP SERPL-CCNC: 131 UNIT/L (ref 40–150)
ALT SERPL-CCNC: 23 UNIT/L (ref 0–55)
APPEARANCE UR: CLEAR
AST SERPL-CCNC: 19 UNIT/L (ref 5–34)
BACTERIA #/AREA URNS AUTO: ABNORMAL /HPF
BASOPHILS # BLD AUTO: 0.03 X10(3)/MCL
BASOPHILS NFR BLD AUTO: 0.3 %
BILIRUB SERPL-MCNC: 0.3 MG/DL
BILIRUB UR QL STRIP.AUTO: NEGATIVE
BUN SERPL-MCNC: 5.5 MG/DL (ref 7–18.7)
CALCIUM SERPL-MCNC: 9 MG/DL (ref 8.4–10.2)
CHLORIDE SERPL-SCNC: 109 MMOL/L (ref 98–107)
CO2 SERPL-SCNC: 19 MMOL/L (ref 22–29)
COLOR UR AUTO: YELLOW
CREAT SERPL-MCNC: 0.56 MG/DL (ref 0.55–1.02)
EOSINOPHIL # BLD AUTO: 0.1 X10(3)/MCL (ref 0–0.9)
EOSINOPHIL NFR BLD AUTO: 0.9 %
ERYTHROCYTE [DISTWIDTH] IN BLOOD BY AUTOMATED COUNT: 12.7 % (ref 11.5–17)
GFR SERPLBLD CREATININE-BSD FMLA CKD-EPI: >60 MLS/MIN/1.73/M2
GLOBULIN SER-MCNC: 3.9 GM/DL (ref 2.4–3.5)
GLUCOSE SERPL-MCNC: 89 MG/DL (ref 74–100)
GLUCOSE UR QL STRIP.AUTO: NORMAL
HCT VFR BLD AUTO: 31 % (ref 37–47)
HGB BLD-MCNC: 10.7 G/DL (ref 12–16)
HOLD SPECIMEN: NORMAL
HYALINE CASTS #/AREA URNS LPF: ABNORMAL /LPF
IMM GRANULOCYTES # BLD AUTO: 0.03 X10(3)/MCL (ref 0–0.04)
IMM GRANULOCYTES NFR BLD AUTO: 0.3 %
KETONES UR QL STRIP.AUTO: NEGATIVE
LEUKOCYTE ESTERASE UR QL STRIP.AUTO: NEGATIVE
LYMPHOCYTES # BLD AUTO: 1.8 X10(3)/MCL (ref 0.6–4.6)
LYMPHOCYTES NFR BLD AUTO: 15.5 %
MAGNESIUM SERPL-MCNC: 1.9 MG/DL (ref 1.6–2.6)
MCH RBC QN AUTO: 29.5 PG (ref 27–31)
MCHC RBC AUTO-ENTMCNC: 34.5 G/DL (ref 33–36)
MCV RBC AUTO: 85.4 FL (ref 80–94)
MONOCYTES # BLD AUTO: 0.68 X10(3)/MCL (ref 0.1–1.3)
MONOCYTES NFR BLD AUTO: 5.9 %
MUCOUS THREADS URNS QL MICRO: ABNORMAL /LPF
NEUTROPHILS # BLD AUTO: 8.94 X10(3)/MCL (ref 2.1–9.2)
NEUTROPHILS NFR BLD AUTO: 77.1 %
NITRITE UR QL STRIP.AUTO: NEGATIVE
NRBC BLD AUTO-RTO: 0 %
PH UR STRIP.AUTO: 7 [PH]
PLATELET # BLD AUTO: 332 X10(3)/MCL (ref 130–400)
PMV BLD AUTO: 8.9 FL (ref 7.4–10.4)
POTASSIUM SERPL-SCNC: 3.9 MMOL/L (ref 3.5–5.1)
PROT SERPL-MCNC: 6.7 GM/DL (ref 6.4–8.3)
PROT UR QL STRIP.AUTO: ABNORMAL
PSYCHE PATHOLOGY RESULT: NORMAL
RBC # BLD AUTO: 3.63 X10(6)/MCL (ref 4.2–5.4)
RBC #/AREA URNS AUTO: ABNORMAL /HPF
RBC UR QL AUTO: ABNORMAL
SODIUM SERPL-SCNC: 136 MMOL/L (ref 136–145)
SP GR UR STRIP.AUTO: 1.02 (ref 1–1.03)
SQUAMOUS #/AREA URNS LPF: ABNORMAL /HPF
UROBILINOGEN UR STRIP-ACNC: ABNORMAL
WBC # SPEC AUTO: 11.58 X10(3)/MCL (ref 4.5–11.5)
WBC #/AREA URNS AUTO: ABNORMAL /HPF

## 2024-03-15 PROCEDURE — 81001 URINALYSIS AUTO W/SCOPE: CPT | Performed by: STUDENT IN AN ORGANIZED HEALTH CARE EDUCATION/TRAINING PROGRAM

## 2024-03-15 PROCEDURE — 99284 EMERGENCY DEPT VISIT MOD MDM: CPT | Mod: 25

## 2024-03-15 PROCEDURE — 80053 COMPREHEN METABOLIC PANEL: CPT | Performed by: STUDENT IN AN ORGANIZED HEALTH CARE EDUCATION/TRAINING PROGRAM

## 2024-03-15 PROCEDURE — 83735 ASSAY OF MAGNESIUM: CPT | Performed by: STUDENT IN AN ORGANIZED HEALTH CARE EDUCATION/TRAINING PROGRAM

## 2024-03-15 PROCEDURE — 85025 COMPLETE CBC W/AUTO DIFF WBC: CPT | Performed by: STUDENT IN AN ORGANIZED HEALTH CARE EDUCATION/TRAINING PROGRAM

## 2024-03-15 NOTE — ED PROVIDER NOTES
Encounter Date: 3/15/2024       History     Chief Complaint   Patient presents with    Vaginal Bleeding     Patient reports     Abdominal Pain     Patient reports left lower abdominal pain with vaginal bleeding during pregnancy since this morning.Patient states that she is 4 months pregnant.   G2,P1     Patient presents to the emergency department due to vaginal bleeding in pregnancy.  Approximately 17 weeks pregnant.  Ultrasound performed 3 days ago shows twin pregnancy, twin a appears viable with a normal heart rate, twin B it was already experienced fetal demise.  She has been referred to [facility] it was not followed up yet.  She reports last night she was started to have some lower left-sided pelvic cramping    The history is provided by the patient. The history is limited by a language barrier. A  was used.     Review of patient's allergies indicates:   Allergen Reactions    Pineapple Hives and Shortness Of Breath     Past Medical History:   Diagnosis Date    Hyperlipidemia     Hypertension      Past Surgical History:   Procedure Laterality Date    APPENDECTOMY       SECTION  2018     Family History   Problem Relation Age of Onset    Diabetes Maternal Grandmother     Diabetes Maternal Grandfather     No Known Problems Father     No Known Problems Mother      Social History     Tobacco Use    Smoking status: Never     Passive exposure: Current    Smokeless tobacco: Never   Substance Use Topics    Alcohol use: Not Currently    Drug use: Never     Review of Systems   Constitutional:  Negative for chills and fever.   HENT:  Negative for congestion and sore throat.    Respiratory:  Negative for cough and shortness of breath.    Cardiovascular:  Negative for chest pain and palpitations.   Gastrointestinal:  Negative for abdominal pain and nausea.   Genitourinary:  Positive for pelvic pain and vaginal bleeding. Negative for dysuria and hematuria.   Musculoskeletal:  Negative for  arthralgias and myalgias.   Neurological:  Negative for dizziness and weakness.       Physical Exam     Initial Vitals [03/15/24 0714]   BP Pulse Resp Temp SpO2   124/87 98 18 98.2 °F (36.8 °C) 99 %      MAP       --         Physical Exam    Nursing note and vitals reviewed.  Constitutional: She appears well-developed and well-nourished.   HENT:   Head: Normocephalic and atraumatic.   Eyes: EOM are normal. Pupils are equal, round, and reactive to light.   Neck: Neck supple.   Normal range of motion.  Cardiovascular:  Normal rate, regular rhythm and normal heart sounds.           Pulmonary/Chest: Breath sounds normal. No respiratory distress.   Abdominal: Abdomen is soft. There is no abdominal tenderness.   Musculoskeletal:         General: No edema. Normal range of motion.      Cervical back: Normal range of motion and neck supple.     Neurological: She is alert and oriented to person, place, and time.   Skin: Skin is warm and dry.         ED Course   Procedures  Labs Reviewed   COMPREHENSIVE METABOLIC PANEL - Abnormal; Notable for the following components:       Result Value    Chloride 109 (*)     Carbon Dioxide 19 (*)     Blood Urea Nitrogen 5.5 (*)     Albumin Level 2.8 (*)     Globulin 3.9 (*)     Albumin/Globulin Ratio 0.7 (*)     All other components within normal limits   URINALYSIS, REFLEX TO URINE CULTURE - Abnormal; Notable for the following components:    Protein, UA Trace (*)     Blood, UA 3+ (*)     Urobilinogen, UA 1+ (*)     Bacteria, UA Trace (*)     Squamous Epithelial Cells, UA Occ (*)     Mucous, UA Few (*)     All other components within normal limits   CBC WITH DIFFERENTIAL - Abnormal; Notable for the following components:    WBC 11.58 (*)     RBC 3.63 (*)     Hgb 10.7 (*)     Hct 31.0 (*)     All other components within normal limits   MAGNESIUM - Normal   CBC W/ AUTO DIFFERENTIAL    Narrative:     The following orders were created for panel order CBC auto differential.  Procedure                                Abnormality         Status                     ---------                               -----------         ------                     CBC with Differential[[id number]]       Abnormal            Final result                 Please view results for these tests on the individual orders.   EXTRA TUBES    Narrative:     The following orders were created for panel order EXTRA TUBES.  Procedure                               Abnormality         Status                     ---------                               -----------         ------                     Light Blue Top Hold[[id number]]                             Final result               Gold Top Hold[[id number]]                                   Final result               Pink Top Hold[[id number]]                                   Final result                 Please view results for these tests on the individual orders.   LIGHT BLUE TOP HOLD   GOLD TOP HOLD   PINK TOP HOLD          Imaging Results              US OB 14+ Wks, TransAbd, Single Gestation (Final result)  Result time 03/15/24 09:30:58      Final result by Alisa Villalobos MD (03/15/24 09:30:58)                   Impression:      Twin intrauterine gestation.    Twin A demonstrates cardiac activity.    Known fetal demise, Twin B with absent cardiac activity.    Pocket of fluid measured at the lower uterine segment.  Cervix poorly visualized.  Posterior placenta is low lying.  Given poor visualization of the cervix unable to assess for previa.      Electronically signed by: Alisa Villalobos  Date:    03/15/2024  Time:    09:30               Narrative:    EXAMINATION:  US OB 14+ WEEKS, TRANSABDOM, SINGLE GESTATION    CLINICAL HISTORY:  Abnormal uterine and vaginal bleeding, unspecified    TECHNIQUE:  Transabdominal 2nd or 3rd trimester obstetrical ultrasound was performed.  Limited    COMPARISON:  03/12/2024    FINDINGS:  GESTATION: Twin intrauterine gestation.    TWIN A:    Presentation:  Breech      Cardiac activity: 142 bpm    Placenta: Posterior.  Low lying posterior placenta.  Unable to accurately visualize the cervix to assess for previa.    BIOMETRY: limited evaluation.  Measured femur length 2 cm, 16 weeks.    A 5.4 x 4.2 x 2.9 cm pocket of fluid is measured at the lower uterine segment.  Cervix not well visualized trans abdominally.    TWIN B:    Known fetal demise.  No cardiac activity.  Crown-rump length measures 8.3 cm, 14 weeks 2 days.    ANATOMY:    Not assessed    MATERNAL:    Uterus: Gravid uterus.    Cervix: Poorly visualized trans abdominally    Adnexa: Unremarkable                                       Medications - No data to display  Medical Decision Making  Vital signs are stable.  Lab workup as above and reassuring, stable hemoglobin.  Ultrasound shows a known fetal demise of twin B, twin A has cardiac activity.  Bleeding has almost resolved in the ER.  Patient has follow up with MFM for further evaluation, appropriate for discharge.    Amount and/or Complexity of Data Reviewed  Labs: ordered. Decision-making details documented in ED Course.  Radiology: ordered. Decision-making details documented in ED Course.                                      Clinical Impression:  Final diagnoses:  [N93.9] Vaginal bleeding  [O46.90] Vaginal bleeding in pregnancy (Primary)          ED Disposition Condition    Discharge Stable          ED Prescriptions    None       Follow-up Information       Follow up With Specialties Details Why Contact Info    [facility] Emergency Dept Emergency Medicine Go to  If symptoms worsen [address] [facility] [address] [phone]             Evan Riggins MD  04/04/24 7334

## 2024-03-18 LAB
# FETUSES: ABNORMAL
2ND TRIMESTER 4 SCREEN SERPL-IMP: ABNORMAL
AFP ADJ MOM SERPL: 38.65 MOM
AFP SERPL IA-MCNC: 2597 NG/ML
AGE AT DELIVERY: ABNORMAL
ANNOTATION COMMENT IMP: ABNORMAL
B-HCG ADJ MOM SERPL: 1.5 MOM
CHORION TYPE: ABNORMAL
COLLECT DATE: ABNORMAL
CURRENT SMOKER: ABNORMAL
FET TS 21 RISK FROM MAT AGE: ABNORMAL
GA METHOD: ABNORMAL
GA US.COMPOSITE.EST: ABNORMAL WK,D
HCG SERPL IA-ACNC: 78.4 IU/ML
HX OF NTD QL: NO
HX OF NTD QL: NO
HX OF TRISOMY 21 QL: NO
IDDM PATIENT QL: NO
INHIBIN A ADJ MOM SERPL: 1.57 MOM
INHIBIN SERPL-MCNC: 437 PG/ML
IVF PREGNANCY: NO
LABORATORY COMMENT REPORT: ABNORMAL
M PHYSICIAN PHONE NUMBER: ABNORMAL
MATERNAL RISK FACTORS: ABNORMAL
NEURAL TUBE DEFECT RISK FETUS: ABNORMAL %
RECOM F/U: ABNORMAL
TEST PERFORMANCE INFO SPEC: ABNORMAL
TS 18 RISK FETUS: ABNORMAL
TS 21 RISK FETUS: ABNORMAL
U ESTRIOL ADJ MOM SERPL: 0.74 MOM
U ESTRIOL SERPL-MCNC: 1.12 NG/ML

## 2024-03-19 ENCOUNTER — OFFICE VISIT (OUTPATIENT)
Dept: MATERNAL FETAL MEDICINE | Facility: CLINIC | Age: 24
End: 2024-03-19
Payer: MEDICAID

## 2024-03-19 ENCOUNTER — PROCEDURE VISIT (OUTPATIENT)
Dept: MATERNAL FETAL MEDICINE | Facility: CLINIC | Age: 24
End: 2024-03-19
Payer: MEDICAID

## 2024-03-19 VITALS
HEIGHT: 61 IN | DIASTOLIC BLOOD PRESSURE: 89 MMHG | SYSTOLIC BLOOD PRESSURE: 129 MMHG | WEIGHT: 187.25 LBS | BODY MASS INDEX: 35.35 KG/M2

## 2024-03-19 DIAGNOSIS — O46.92 VAGINAL BLEEDING IN PREGNANCY, SECOND TRIMESTER: ICD-10-CM

## 2024-03-19 DIAGNOSIS — O10.919 CHRONIC HYPERTENSION AFFECTING PREGNANCY: ICD-10-CM

## 2024-03-19 DIAGNOSIS — Z36.89 ENCOUNTER FOR FETAL ANATOMIC SURVEY: ICD-10-CM

## 2024-03-19 DIAGNOSIS — O36.4XX0 FETAL DEMISE BEFORE 22 WEEKS WITH RETENTION OF DEAD FETUS: Primary | ICD-10-CM

## 2024-03-19 DIAGNOSIS — O30.002 TWIN GESTATION WITH UNKNOWN NUMBER OF PLACENTAS AND AMNIOTIC SACS IN SECOND TRIMESTER: ICD-10-CM

## 2024-03-19 DIAGNOSIS — O41.02X2: ICD-10-CM

## 2024-03-19 PROCEDURE — 3074F SYST BP LT 130 MM HG: CPT | Mod: CPTII,,, | Performed by: OBSTETRICS & GYNECOLOGY

## 2024-03-19 PROCEDURE — 3008F BODY MASS INDEX DOCD: CPT | Mod: CPTII,,, | Performed by: OBSTETRICS & GYNECOLOGY

## 2024-03-19 PROCEDURE — 99215 OFFICE O/P EST HI 40 MIN: CPT | Mod: 25,S$PBB,TH, | Performed by: OBSTETRICS & GYNECOLOGY

## 2024-03-19 PROCEDURE — 76811 OB US DETAILED SNGL FETUS: CPT | Mod: PBBFAC | Performed by: OBSTETRICS & GYNECOLOGY

## 2024-03-19 PROCEDURE — 99417 PROLNG OP E/M EACH 15 MIN: CPT | Mod: S$PBB,,, | Performed by: OBSTETRICS & GYNECOLOGY

## 2024-03-19 PROCEDURE — 3079F DIAST BP 80-89 MM HG: CPT | Mod: CPTII,,, | Performed by: OBSTETRICS & GYNECOLOGY

## 2024-03-19 PROCEDURE — 99212 OFFICE O/P EST SF 10 MIN: CPT | Mod: PBBFAC,TH,25 | Performed by: OBSTETRICS & GYNECOLOGY

## 2024-03-19 PROCEDURE — 99999 PR PBB SHADOW E&M-EST. PATIENT-LVL II: CPT | Mod: PBBFAC,,, | Performed by: OBSTETRICS & GYNECOLOGY

## 2024-03-19 PROCEDURE — 76811 OB US DETAILED SNGL FETUS: CPT | Mod: 26,S$PBB,, | Performed by: OBSTETRICS & GYNECOLOGY

## 2024-03-19 PROCEDURE — 1159F MED LIST DOCD IN RCRD: CPT | Mod: CPTII,,, | Performed by: OBSTETRICS & GYNECOLOGY
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
## 2024-03-26 ENCOUNTER — HOSPITAL ENCOUNTER (EMERGENCY)
Facility: HOSPITAL | Age: 24
Discharge: HOME | End: 2024-03-26
Attending: SPECIALIST
Payer: MEDICAID

## 2024-03-26 ENCOUNTER — OFFICE VISIT (OUTPATIENT)
Dept: MATERNAL FETAL MEDICINE | Facility: CLINIC | Age: 24
End: 2024-03-26
Payer: MEDICAID

## 2024-03-26 ENCOUNTER — PROCEDURE VISIT (OUTPATIENT)
Dept: MATERNAL FETAL MEDICINE | Facility: CLINIC | Age: 24
End: 2024-03-26
Payer: MEDICAID

## 2024-03-26 VITALS
WEIGHT: 186.38 LBS | HEIGHT: 60 IN | SYSTOLIC BLOOD PRESSURE: 115 MMHG | DIASTOLIC BLOOD PRESSURE: 71 MMHG | HEART RATE: 90 BPM | BODY MASS INDEX: 36.59 KG/M2

## 2024-03-26 VITALS
HEART RATE: 98 BPM | DIASTOLIC BLOOD PRESSURE: 79 MMHG | TEMPERATURE: 99 F | SYSTOLIC BLOOD PRESSURE: 124 MMHG | OXYGEN SATURATION: 99 %

## 2024-03-26 DIAGNOSIS — O10.919 CHRONIC HYPERTENSION AFFECTING PREGNANCY: ICD-10-CM

## 2024-03-26 DIAGNOSIS — Z36.2 ENCOUNTER FOR FOLLOW-UP ULTRASOUND OF FETAL ANATOMY: Primary | ICD-10-CM

## 2024-03-26 DIAGNOSIS — O36.4XX0 FETAL DEMISE BEFORE 22 WEEKS WITH RETENTION OF DEAD FETUS: ICD-10-CM

## 2024-03-26 DIAGNOSIS — O41.02X2: ICD-10-CM

## 2024-03-26 DIAGNOSIS — O30.002 TWIN GESTATION WITH UNKNOWN NUMBER OF PLACENTAS AND AMNIOTIC SACS IN SECOND TRIMESTER: ICD-10-CM

## 2024-03-26 DIAGNOSIS — O99.212 OBESITY AFFECTING PREGNANCY IN SECOND TRIMESTER, UNSPECIFIED OBESITY TYPE: ICD-10-CM

## 2024-03-26 DIAGNOSIS — O36.4XX0 FETAL DEMISE BEFORE 22 WEEKS WITH RETENTION OF DEAD FETUS: Primary | ICD-10-CM

## 2024-03-26 LAB
AFP-TM SERPL-MCNC: 1148.8 NG/ML
CTP QC/QA: YES
KLEIHAUER-BETKE STAIN: NORMAL
RUPTURE OF MEMBRANE: NEGATIVE

## 2024-03-26 PROCEDURE — 99283 EMERGENCY DEPT VISIT LOW MDM: CPT

## 2024-03-26 PROCEDURE — 76815 OB US LIMITED FETUS(S): CPT | Mod: S$GLB,,, | Performed by: OBSTETRICS & GYNECOLOGY

## 2024-03-26 PROCEDURE — 1159F MED LIST DOCD IN RCRD: CPT | Mod: CPTII,S$GLB,, | Performed by: OBSTETRICS & GYNECOLOGY

## 2024-03-26 PROCEDURE — 82105 ALPHA-FETOPROTEIN SERUM: CPT | Mod: 59 | Performed by: SPECIALIST

## 2024-03-26 PROCEDURE — 85460 HEMOGLOBIN FETAL: CPT | Performed by: SPECIALIST

## 2024-03-26 PROCEDURE — 3074F SYST BP LT 130 MM HG: CPT | Mod: CPTII,S$GLB,, | Performed by: OBSTETRICS & GYNECOLOGY

## 2024-03-26 PROCEDURE — 84112 EVAL AMNIOTIC FLUID PROTEIN: CPT

## 2024-03-26 PROCEDURE — 3078F DIAST BP <80 MM HG: CPT | Mod: CPTII,S$GLB,, | Performed by: OBSTETRICS & GYNECOLOGY

## 2024-03-26 PROCEDURE — 99214 OFFICE O/P EST MOD 30 MIN: CPT | Mod: TH,S$GLB,, | Performed by: OBSTETRICS & GYNECOLOGY

## 2024-03-26 PROCEDURE — 1160F RVW MEDS BY RX/DR IN RCRD: CPT | Mod: CPTII,S$GLB,, | Performed by: OBSTETRICS & GYNECOLOGY

## 2024-03-26 PROCEDURE — 3008F BODY MASS INDEX DOCD: CPT | Mod: CPTII,S$GLB,, | Performed by: OBSTETRICS & GYNECOLOGY

## 2024-03-26 NOTE — ED PROVIDER NOTES
SAEED NOTE     Admit Date: 3/26/2024  SAEED Physician: Daniel Rosas  Primary OBGYN: /[facility]    Chief Complaint   Patient presents with     Premature Rupture Of Membranes       Hospital Course:  Lily Mckeon is a 23 y.o.  at 18w0d presents complaining of possible ruptured membranes.  Patient was referred from office of Maternal Fetal Medicine physician for specific testing for ROM plus as well as Kleihauer Betke and repeat alpha fetal protein.    This IUP is complicated by twin gestation.  Demise of twin a.  Severe oligohydramnios.  Abnormal alpha fetoprotein..    Patient denies vaginal bleeding, contractions, headache, vision changes, RUQ pain, dysuria, fever, and nausea/vomiting.  Fetal Movement:  Not applicable.      /79   Pulse 98   Temp 99 °F (37.2 °C)   LMP  (LMP Unknown)   SpO2 99%   Temp:  [99 °F (37.2 °C)] 99 °F (37.2 °C)  Pulse:  [90-98] 98  SpO2:  [99 %] 99 %  BP: (115-124)/(71-79) 124/79    General: in no apparent distress  Cardiovascular: regular rate and rhythm no murmurs not examined  Respiratory: clear to auscultation, no wheezes, rales or rhonchi, symmetric air entry not examined  Abdominal: FHT present  Back:  Not examined  CVA tenderness  not examined  Extremeties not examined    SSE:   SVE:  ROM plus acquired      FHT:  Present  TOCO:  No contractions    Medical Decision Making:  ROM plus was collected and negative.  Blood work requested was also collected.    LABS:     Recent Results (from the past 24 hour(s))   POCT RUPTURE OF MEMBRANE    Collection Time: 24  3:25 PM   Result Value Ref Range    Rupture of Membrane Negative Negative     Acceptable Yes    AFP Tumor Marker    Collection Time: 24  3:43 PM   Result Value Ref Range    Alpha Fetoprotein Level 1,148.80 (H) <=8.90 ng/mL     [unfilled]     Imaging Results    None          ASSESMENT: Lily Mckeon is a 23 y.o.   at 18w0d with twin  gestation and demise of twin a.  Severe oligohydramnios.  Abnormal alpha fetoprotein.  No evidence of ruptured membranes.    Discharge Diagnosis/Clinical Impression:  Pregnancy 18 weeks.  Twin gestation with demise of twin a.  Severe oligohydramnios.    Status:Stable    Disposition:  discharged to home    Medications:       Patient Instructions:   Discharge Medication List as of 3/26/2024  4:11 PM        CONTINUE these medications which have NOT CHANGED    Details   NIFEdipine (PROCARDIA-XL) 30 MG (OSM) 24 hr tablet Take 30 mg by mouth once daily., Historical Med      prenatal vit no.124/iron/folic (PRENATAL VITAMIN ORAL) Take by mouth., Historical Med      pyridoxine, vitamin B6, (B-6) 25 MG Tab Take 1 tablet (25 mg total) by mouth every 8 (eight) hours as needed (nausea)., Starting Tue 2/6/2024, Normal             -  She will follow up with her primary OB.    No discharge procedures on file.    Daniel Rosas MD  OB-GYN Hospitalist       Daniel Rosas MD  03/26/24 [id number]       Daniel Rosas MD  03/26/24 [id number]

## 2024-03-27 NOTE — ASSESSMENT & PLAN NOTE
There are  risks associated with obesity which include and increased risk of hypertension, preeclampsia, gestational diabetes, venous thromboembolic disease,  delivery, macrosomia (with resultant shoulder dystocia, obstetric sphincter injury), IUFD, longer first stage of labor, decreased TOLAC success rate, emergent & scheduled  & complications of  (prolonged OR time, delayed delivery, excessive EBL, infection, wound separation/infection, higher spinal failure rate, more difficult intubation).  Obesity is also associated with fetal anomalies, specifically neural tube defects.  Studies have shown that the rate of complication increases with rising BMI and thus pregnancies with maternal morbid obesity are at increased risk.      BMI 36    Recommendations:  TWG goal is 11-20 lbs  Screen for signs/symptoms of obstructive sleep apnea  Nutritionist consult offered (this is to be ordered by primary OB provider)  Consider early 1 hr GCT (between 14-16 weeks gestation); repeat at 24-28 weeks gestation  Start low dose aspirin 81 mg daily at 12-16 weeks for preeclampsia risk reduction  Weekly  testing at 32 weeks if pre-pregnancy BMI >= 45  Lovenox 40 mg BID for VTE prophylaxis while admitted to the hospital (antepartum or postpartum) if BMI >= 40.   Encouraged breastfeeding  Postpartum lifestyle modifications & weight loss

## 2024-03-27 NOTE — ASSESSMENT & PLAN NOTE
Today I counseled the patient on maternal/fetal risks associated with CHTN during pregnancy. Risks include but not limited to fetal growth restriction, miscarriage, abruption, maternal end organ disease (renal failure, MI, and stroke),  delivery, development of superimposed preeclampsia, and eclampsia. She was counseled on the recommendations for blood pressure control, serial ultrasound for fetal growth assessment and  testing, and timing of delivery. I also counseled her on the recommendation for aspirin 81 mg daily which may decrease her risk of developing superimposed preeclampsia.     Recommendations (Please refer to [facility] guidelines):  -Initiate aspirin 81 mg daily at 12-16 weeks gestation for preeclampsia risk reduction  -Continue current medications: Nifedipine 30mg XL daily  -Baseline evaluation with primary OB:   24-hour urine protein or baseline P/C ratio, CMP, and CBC (needs UP:C)  Maternal EKG  Maternal ophthalmic evaluation  Maternal echocardiogram if HTN has been long-standing or EKG is abnormal  -Serial fetal growth ultrasounds every 4-6 weeks, beginning at 26-28 weeks.   -Continued close observation of patient's blood pressures. Avoid hypotension as this has been associated with uteroplacental insufficiency.  -In conjunction with the CHAP study recommendation for BP control: If BP is persistently ?140/90 antihypertensive medication is recommended with goal BP of 120-140/80-90.

## 2024-03-27 NOTE — ASSESSMENT & PLAN NOTE
Due to early-onset significant oligohydramnios of twin B, imaging is severely limited.  It is difficult to determine chorionicity of twins.  Additionally, anatomical imaging is difficult to obtain.  With known IUFD of twin a, and early-onset severe oligohydramnios of twin B, we do not anticipate this will be a viable pregnancy.    3/26/24- Imaging remains severely difficult with oligohydramnios of the living fetus.  She is concerned about leaking and was advised to go to triage for ROM. QUAD screen returned back with AFP of 38 MoMs (severely elevated)! I have discussed this case with two other colleagues and we will send new AFP and KB and recheck level. If still similar, will consider maternal imaging for further evaluation of AFP secreting conditions.

## 2024-03-27 NOTE — PROGRESS NOTES
MATERNAL-FETAL MEDICINE PROGRESS NOTE    SUBJECTIVE:     Ms. Lily Mckeon is a 23 y.o.  female with IUP at 18w0d who is seen in consultation by MFM for evaluation and management of:  Problem   1. Fetal demise (twin A) before 22 weeks with retention of dead fetus   3. Twin gestation with unknown number of placentas and amniotic sacs in second trimester   4. Chronic hypertension affecting pregnancy   5. BMI affecting pregnancy in second trimester       Translating services utilized for visit.   She denies any new issues. She understands pregnancy prognosis is very poor. No LOF, VB, ctx. +FM.     Medication List with Changes/Refills   Current Medications    NIFEDIPINE (PROCARDIA-XL) 30 MG (OSM) 24 HR TABLET    Take 30 mg by mouth once daily.    PRENATAL VIT NO.124/IRON/FOLIC (PRENATAL VITAMIN ORAL)    Take by mouth.    PYRIDOXINE, VITAMIN B6, (B-6) 25 MG TAB    Take 1 tablet (25 mg total) by mouth every 8 (eight) hours as needed (nausea).       Objective:   /71 (BP Location: Right arm)   Pulse 90   Ht 5' (1.524 m)   Wt 84.5 kg (186 lb 6.4 oz)   LMP  (LMP Unknown)   BMI 36.40 kg/m²     Ultrasound performed. See viewpoint for full ultrasound report.    A twin pregnancy is noted but poorly visualized. Chorionicity is unclear but is suspected to be monochorionic. Baby A is non-viable with anhydramnios. Baby B is viable with oligohydramnios and visualization of anatomy is very difficult.     ASSESSMENT/PLAN:     23 y.o.  female with IUP at 18w0d     1. Fetal demise (twin A) before 22 weeks with retention of dead fetus  3/12/24- At [facility], ultrasound revealed IUFD of one twin with suspected birth defect in the living twin. She was referred urgently to our clinic.   3/14/24- Ultrasound evaluation confirmed IUFD of twin A with measurements consistent with a 13w6d fetus. Twin B has oligohydramnios with severely limited imaging due to low amniotic fluid. Please see separate documentation.     3.  Twin gestation with unknown number of placentas and amniotic sacs in second trimester  Due to early-onset significant oligohydramnios of twin B, imaging is severely limited.  It is difficult to determine chorionicity of twins.  Additionally, anatomical imaging is difficult to obtain.  With known IUFD of twin a, and early-onset severe oligohydramnios of twin B, we do not anticipate this will be a viable pregnancy.    3/26/24- Imaging remains severely difficult with oligohydramnios of the living fetus.  She is concerned about leaking and was advised to go to triage for ROM. QUAD screen returned back with AFP of 38 MoMs (severely elevated)! I have discussed this case with two other colleagues and we will send new AFP and KB and recheck level. If still similar, will consider maternal imaging for further evaluation of AFP secreting conditions.     4. Chronic hypertension affecting pregnancy  Today I counseled the patient on maternal/fetal risks associated with CHTN during pregnancy. Risks include but not limited to fetal growth restriction, miscarriage, abruption, maternal end organ disease (renal failure, MI, and stroke),  delivery, development of superimposed preeclampsia, and eclampsia. She was counseled on the recommendations for blood pressure control, serial ultrasound for fetal growth assessment and  testing, and timing of delivery. I also counseled her on the recommendation for aspirin 81 mg daily which may decrease her risk of developing superimposed preeclampsia.     Recommendations (Please refer to [facility] guidelines):  -Initiate aspirin 81 mg daily at 12-16 weeks gestation for preeclampsia risk reduction  -Continue current medications: Nifedipine 30mg XL daily  -Baseline evaluation with primary OB:   24-hour urine protein or baseline P/C ratio, CMP, and CBC (needs UP:C)  Maternal EKG  Maternal ophthalmic evaluation  Maternal echocardiogram if HTN has been long-standing or EKG is  abnormal  -Serial fetal growth ultrasounds every 4-6 weeks, beginning at 26-28 weeks.   -Continued close observation of patient's blood pressures. Avoid hypotension as this has been associated with uteroplacental insufficiency.  -In conjunction with the CHAP study recommendation for BP control: If BP is persistently ?140/90 antihypertensive medication is recommended with goal BP of 120-140/80-90.        5. BMI affecting pregnancy in second trimester  There are  risks associated with obesity which include and increased risk of hypertension, preeclampsia, gestational diabetes, venous thromboembolic disease,  delivery, macrosomia (with resultant shoulder dystocia, obstetric sphincter injury), IUFD, longer first stage of labor, decreased TOLAC success rate, emergent & scheduled  & complications of  (prolonged OR time, delayed delivery, excessive EBL, infection, wound separation/infection, higher spinal failure rate, more difficult intubation).  Obesity is also associated with fetal anomalies, specifically neural tube defects.  Studies have shown that the rate of complication increases with rising BMI and thus pregnancies with maternal morbid obesity are at increased risk.      BMI 36    Recommendations:  TWG goal is 11-20 lbs  Screen for signs/symptoms of obstructive sleep apnea  Nutritionist consult offered (this is to be ordered by primary OB provider)  Consider early 1 hr GCT (between 14-16 weeks gestation); repeat at 24-28 weeks gestation  Start low dose aspirin 81 mg daily at 12-16 weeks for preeclampsia risk reduction  Weekly  testing at 32 weeks if pre-pregnancy BMI >= 45  Lovenox 40 mg BID for VTE prophylaxis while admitted to the hospital (antepartum or postpartum) if BMI >= 40.   Encouraged breastfeeding  Postpartum lifestyle modifications & weight loss        FOLLOW UP:   F/u in 4 weeks for US/MFM visit  Go to labor and delivery now for ROM+ testing and labs.        Michelle Calvin MD  Maternal Fetal Medicine

## 2024-03-27 NOTE — ASSESSMENT & PLAN NOTE
3/12/24- At [facility], ultrasound revealed IUFD of one twin with suspected birth defect in the living twin. She was referred urgently to our clinic.   3/14/24- Ultrasound evaluation confirmed IUFD of twin A with measurements consistent with a 13w6d fetus. Twin B has oligohydramnios with severely limited imaging due to low amniotic fluid. Please see separate documentation.

## 2024-04-08 ENCOUNTER — OFFICE VISIT (OUTPATIENT)
Dept: MATERNAL FETAL MEDICINE | Facility: CLINIC | Age: 24
End: 2024-04-08
Payer: MEDICAID

## 2024-04-08 ENCOUNTER — PROCEDURE VISIT (OUTPATIENT)
Dept: MATERNAL FETAL MEDICINE | Facility: CLINIC | Age: 24
End: 2024-04-08
Payer: MEDICAID

## 2024-04-08 VITALS
HEART RATE: 97 BPM | BODY MASS INDEX: 36.92 KG/M2 | HEIGHT: 60 IN | WEIGHT: 188.06 LBS | SYSTOLIC BLOOD PRESSURE: 125 MMHG | DIASTOLIC BLOOD PRESSURE: 81 MMHG

## 2024-04-08 DIAGNOSIS — Z36.2 ENCOUNTER FOR FOLLOW-UP ULTRASOUND OF FETAL ANATOMY: ICD-10-CM

## 2024-04-08 DIAGNOSIS — O36.4XX0 FETAL DEMISE BEFORE 22 WEEKS WITH RETENTION OF DEAD FETUS: Primary | ICD-10-CM

## 2024-04-08 DIAGNOSIS — O99.212 OTHER OBESITY DUE TO EXCESS CALORIES AFFECTING PREGNANCY IN SECOND TRIMESTER: ICD-10-CM

## 2024-04-08 DIAGNOSIS — O41.02X2: ICD-10-CM

## 2024-04-08 DIAGNOSIS — O10.919 CHRONIC HYPERTENSION AFFECTING PREGNANCY: ICD-10-CM

## 2024-04-08 DIAGNOSIS — E66.09 OTHER OBESITY DUE TO EXCESS CALORIES AFFECTING PREGNANCY IN SECOND TRIMESTER: ICD-10-CM

## 2024-04-08 DIAGNOSIS — O30.002 TWIN GESTATION WITH UNKNOWN NUMBER OF PLACENTAS AND AMNIOTIC SACS IN SECOND TRIMESTER: ICD-10-CM

## 2024-04-08 PROCEDURE — 76815 OB US LIMITED FETUS(S): CPT | Mod: S$GLB,,, | Performed by: OBSTETRICS & GYNECOLOGY

## 2024-04-08 PROCEDURE — 1160F RVW MEDS BY RX/DR IN RCRD: CPT | Mod: CPTII,S$GLB,, | Performed by: OBSTETRICS & GYNECOLOGY

## 2024-04-08 PROCEDURE — 3008F BODY MASS INDEX DOCD: CPT | Mod: CPTII,S$GLB,, | Performed by: OBSTETRICS & GYNECOLOGY

## 2024-04-08 PROCEDURE — 3079F DIAST BP 80-89 MM HG: CPT | Mod: CPTII,S$GLB,, | Performed by: OBSTETRICS & GYNECOLOGY

## 2024-04-08 PROCEDURE — 3074F SYST BP LT 130 MM HG: CPT | Mod: CPTII,S$GLB,, | Performed by: OBSTETRICS & GYNECOLOGY

## 2024-04-08 PROCEDURE — 1159F MED LIST DOCD IN RCRD: CPT | Mod: CPTII,S$GLB,, | Performed by: OBSTETRICS & GYNECOLOGY

## 2024-04-08 PROCEDURE — 99214 OFFICE O/P EST MOD 30 MIN: CPT | Mod: 25,TH,S$GLB, | Performed by: OBSTETRICS & GYNECOLOGY

## 2024-04-08 NOTE — ASSESSMENT & PLAN NOTE
Previously counseled the patient on maternal/fetal risks associated with CHTN during pregnancy. Risks include but not limited to fetal growth restriction, miscarriage, abruption, maternal end organ disease (renal failure, MI, and stroke),  delivery, development of superimposed preeclampsia, and eclampsia.      Recommendations ([facility]):  -Continue aspirin 81 mg daily for preeclampsia risk reduction  -Continue current medications: Nifedipine 30mg XL daily  -Baseline evaluation with primary OB:   24-hour urine protein or baseline P/C ratio, CMP, and CBC (needs UP:C)  Maternal EKG  Maternal ophthalmic evaluation  Maternal echocardiogram if HTN has been long-standing or EKG is abnormal  -Serial fetal growth ultrasounds every 4-6 weeks, beginning at 26-28 weeks.   -Continued close observation of patient's blood pressures. Avoid hypotension as this has been associated with uteroplacental insufficiency.  -In conjunction with the CHAP study recommendation for BP control: If BP is persistently ?140/90 antihypertensive medication is recommended with goal BP of 120-140/80-90.

## 2024-04-08 NOTE — PROGRESS NOTES
Maternal Fetal Medicine follow up consult    SUBJECTIVE:     Lily Mckeon is a 23 y.o.  female with IUP at 19w6d who is seen in follow up consultation by M.  Pregnancy complications include:   Problem   1. Fetal demise (twin A) before 22 weeks with retention of dead fetus   2. Oligohydramnios antepartum, second trimester, fetus 2   3. Twin gestation with unknown number of placentas and amniotic sacs in second trimester   4. Chronic hypertension affecting pregnancy   5. BMI affecting pregnancy in second trimester     Lily presents for routine follow up appointment.  Denies LOF, VB, contractions. Positive fetal movement.    She did repeat AFP which was improved from last result (down trending). KB was normal.    Previous notes reviewed.   No changes to medical, surgical, family, social, or obstetric history.    Interval history since last MFM visit: see above    Medications reviewed.    Care team members:  [facility] - Primary OB     OBJECTIVE:   /81 (BP Location: Right arm)   Pulse 97   Ht 5' (1.524 m)   Wt 85.3 kg (188 lb 0.8 oz)   LMP  (LMP Unknown)   BMI 36.73 kg/m²     Ultrasound performed. See viewpoint for full ultrasound report.    Baby B is viable and is seen in cephalic presentation.  No new abnormalities are noted; imaging is severely limited secondary to oligohydramnios of baby B.   Placenta is posterior.      ASSESSMENT/PLAN:     23 y.o.  female with IUP at 19w6d    Pregnancy is significantly abnormal with demise of baby A, persistent oligohydramnios of baby B with anatomy poorly seen. AFP was significantly abnormal and can represent both fetal or maternal abnormality. It was repeated and was better. It is possible that baby A had a defect resulting in abnormal AFP. We will hold off for now from looking for maternal pathology as the pregnancy is certainly abnormal appearing. The kidneys and bladder have been seen on baby B and PPROM cannot be ruled out although she denies  leaking. I will consider admission after next appt. Fetal morbidity/mortality is high.    1. Fetal demise (twin A) before 22 weeks with retention of dead fetus  3/12/24- At [facility], ultrasound revealed IUFD of one twin with suspected birth defect in the living twin. She was referred urgently to our clinic.   3/14/24- Ultrasound evaluation confirmed IUFD of twin A with measurements consistent with a 13w6d fetus. Twin B has oligohydramnios with severely limited imaging due to low amniotic fluid. Please see separate documentation.       2. Oligohydramnios antepartum, second trimester, fetus 2  Early onset oligohydramnios is identified today (16w2d) on twin B. Due to low amniotic fluid, imaging is severely limited.     3. Twin gestation with unknown number of placentas and amniotic sacs in second trimester  Due to early-onset significant oligohydramnios of twin B, imaging is severely limited.  It is difficult to determine chorionicity of twins.  Additionally, anatomical imaging is difficult to obtain.  With known IUFD of twin a, and early-onset severe oligohydramnios of twin B, we do not anticipate this will be a viable pregnancy.    3/26/24- Imaging remains severely difficult with oligohydramnios of the living fetus.  She is concerned about leaking and was advised to go to triage for ROM. QUAD screen returned back with AFP of 38 MoMs (severely elevated)! I have discussed this case with two other colleagues and we will send new AFP and KB and recheck level. If still similar, will consider maternal imaging for further evaluation of AFP secreting conditions.     4/8/24- AFP repeated for comparison and decreased significantly from initial assessment likely representing fetal origin rather than maternal pathology. Imaging for Twin B remains severely limited due to low amniotic fluid.    4. Chronic hypertension affecting pregnancy  Previously counseled the patient on maternal/fetal risks associated with CHTN during pregnancy.  Risks include but not limited to fetal growth restriction, miscarriage, abruption, maternal end organ disease (renal failure, MI, and stroke),  delivery, development of superimposed preeclampsia, and eclampsia.      Recommendations (Please refer to [facility] guidelines):  -Continue aspirin 81 mg daily for preeclampsia risk reduction  -Continue current medications: Nifedipine 30mg XL daily  -Baseline evaluation with primary OB:   24-hour urine protein or baseline P/C ratio, CMP, and CBC (needs UP:C)  Maternal EKG  Maternal ophthalmic evaluation  Maternal echocardiogram if HTN has been long-standing or EKG is abnormal  -Serial fetal growth ultrasounds every 4-6 weeks, beginning at 26-28 weeks.   -Continued close observation of patient's blood pressures. Avoid hypotension as this has been associated with uteroplacental insufficiency.  -In conjunction with the CHAP study recommendation for BP control: If BP is persistently ?140/90 antihypertensive medication is recommended with goal BP of 120-140/80-90.        5. BMI affecting pregnancy in second trimester  Please see prior documentation for specific counseling.     BMI 36    Recommendations:  TWG goal is 11-20 lbs  Screen for signs/symptoms of obstructive sleep apnea  Nutritionist consult offered (this is to be ordered by primary OB provider)  Continue low dose aspirin 81 mg daily for preeclampsia risk reduction  Weekly  testing at 32 weeks if pre-pregnancy BMI >= 45  Lovenox 40 mg BID for VTE prophylaxis while admitted to the hospital (antepartum or postpartum) if BMI >= 40.   Encouraged breastfeeding  Postpartum lifestyle modifications & weight loss      F/u in 3 weeks for [facility] visit /growth ultrasound    Michelle Calvin MD  Maternal Fetal Medicine

## 2024-04-08 NOTE — ASSESSMENT & PLAN NOTE
Please see prior documentation for specific counseling.     BMI 36    Recommendations:  TWG goal is 11-20 lbs  Screen for signs/symptoms of obstructive sleep apnea  Nutritionist consult offered (this is to be ordered by primary OB provider)  Continue low dose aspirin 81 mg daily for preeclampsia risk reduction  Weekly  testing at 32 weeks if pre-pregnancy BMI >= 45  Lovenox 40 mg BID for VTE prophylaxis while admitted to the hospital (antepartum or postpartum) if BMI >= 40.   Encouraged breastfeeding  Postpartum lifestyle modifications & weight loss

## 2024-04-08 NOTE — ASSESSMENT & PLAN NOTE
3/12/24- At [facility], ultrasound revealed IUFD of one twin with suspected birth defect in the living twin. She was referred urgently to our clinic.   3/14/24- Ultrasound evaluation confirmed IUFD of twin A with measurements consistent with a 13w6d fetus. Twin B has oligohydramnios with severely limited imaging due to low amniotic fluid. Please see separate documentation.

## 2024-04-09 ENCOUNTER — OFFICE VISIT (OUTPATIENT)
Dept: FAMILY MEDICINE | Facility: CLINIC | Age: 24
End: 2024-04-09
Payer: MEDICAID

## 2024-04-09 ENCOUNTER — DOCUMENTATION ONLY (OUTPATIENT)
Dept: MATERNAL FETAL MEDICINE | Facility: CLINIC | Age: 24
End: 2024-04-09
Payer: MEDICAID

## 2024-04-09 VITALS
RESPIRATION RATE: 18 BRPM | HEIGHT: 60 IN | WEIGHT: 188 LBS | BODY MASS INDEX: 36.91 KG/M2 | DIASTOLIC BLOOD PRESSURE: 71 MMHG | HEART RATE: 92 BPM | OXYGEN SATURATION: 98 % | TEMPERATURE: 98 F | SYSTOLIC BLOOD PRESSURE: 105 MMHG

## 2024-04-09 DIAGNOSIS — O41.02X2: ICD-10-CM

## 2024-04-09 DIAGNOSIS — O02.1 FETAL DEMISE BEFORE 20 WEEKS WITH RETENTION OF DEAD FETUS: ICD-10-CM

## 2024-04-09 DIAGNOSIS — Z3A.20 20 WEEKS GESTATION OF PREGNANCY: Primary | ICD-10-CM

## 2024-04-09 LAB
BILIRUB SERPL-MCNC: NEGATIVE MG/DL
BLOOD URINE, POC: NEGATIVE
CLARITY, POC UA: CLEAR
COLOR, POC UA: YELLOW
GLUCOSE UR QL STRIP: NEGATIVE
KETONES UR QL STRIP: NEGATIVE
LEUKOCYTE ESTERASE URINE, POC: NEGATIVE
NITRITE, POC UA: NEGATIVE
PH, POC UA: 6
PROTEIN, POC: NEGATIVE
SPECIFIC GRAVITY, POC UA: 1.03
UROBILINOGEN, POC UA: 1

## 2024-04-09 PROCEDURE — 99213 OFFICE O/P EST LOW 20 MIN: CPT | Mod: PBBFAC

## 2024-04-09 PROCEDURE — 81002 URINALYSIS NONAUTO W/O SCOPE: CPT | Mod: PBBFAC
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
## 2024-04-09 NOTE — PROGRESS NOTES
Elke, RN at [facility] contacted office to see if Dr. Espino could take over MFM care for the patient. I advised her that he is out of the office until April 23. Elke advised that patient is a twin pregnancy with demise of one twin. She also said that the patient has been seeing Dr. Calvin and a MFM in [place]. I told her that I could speak to Dr. Espino upon his return, but suggested she call [facility] and see if there is a different MFM she can see in the meantime.

## 2024-04-10 ENCOUNTER — HOSPITAL ENCOUNTER (EMERGENCY)
Facility: HOSPITAL | Age: 24
Discharge: HOME | End: 2024-04-10
Payer: MEDICAID

## 2024-04-10 VITALS
TEMPERATURE: 98 F | HEART RATE: 102 BPM | RESPIRATION RATE: 18 BRPM | SYSTOLIC BLOOD PRESSURE: 123 MMHG | DIASTOLIC BLOOD PRESSURE: 74 MMHG

## 2024-04-10 DIAGNOSIS — O46.90 VAGINAL BLEEDING DURING PREGNANCY: ICD-10-CM

## 2024-04-10 LAB
APPEARANCE UR: CLEAR
BACTERIA #/AREA URNS AUTO: ABNORMAL /HPF
BILIRUB UR QL STRIP.AUTO: NEGATIVE
COLOR UR AUTO: ABNORMAL
CTP QC/QA: YES
GLUCOSE UR QL STRIP.AUTO: NORMAL
KETONES UR QL STRIP.AUTO: NEGATIVE
LEUKOCYTE ESTERASE UR QL STRIP.AUTO: 25
MUCOUS THREADS URNS QL MICRO: ABNORMAL /LPF
NITRITE UR QL STRIP.AUTO: NEGATIVE
PH UR STRIP.AUTO: 7 [PH]
PROT UR QL STRIP.AUTO: NEGATIVE
RBC #/AREA URNS AUTO: ABNORMAL /HPF
RBC UR QL AUTO: ABNORMAL
RUPTURE OF MEMBRANE: NEGATIVE
SP GR UR STRIP.AUTO: 1.01 (ref 1–1.03)
SQUAMOUS #/AREA URNS LPF: ABNORMAL /HPF
UROBILINOGEN UR STRIP-ACNC: NORMAL
WBC #/AREA URNS AUTO: ABNORMAL /HPF

## 2024-04-10 PROCEDURE — 81001 URINALYSIS AUTO W/SCOPE: CPT | Performed by: OBSTETRICS & GYNECOLOGY

## 2024-04-10 PROCEDURE — 99284 EMERGENCY DEPT VISIT MOD MDM: CPT | Mod: 25

## 2024-04-10 PROCEDURE — 84112 EVAL AMNIOTIC FLUID PROTEIN: CPT

## 2024-04-10 RX ORDER — ASPIRIN 325 MG/1
81 TABLET, FILM COATED ORAL DAILY
Status: ON HOLD | COMMUNITY
End: 2024-05-15 | Stop reason: HOSPADM
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
## 2024-04-10 NOTE — ED NOTES
Dr Montesinos at  with  line explaining discharge care instructions, results from test and explanation of care received today.

## 2024-04-10 NOTE — PROGRESS NOTES
I have personally reviewed the review of systems (ROS) and past, family and social histories (PFSH) documented above by the resident.  I have reviewed the care furnished by the resident during the encounter, including a review of the patient's medical history, the resident's findings on physical examination, diagnosis, and the treatment plan.  I participated in the management of the patient and was immediately available throughout the encounter.   I was physically present during all key portions of the service(s) provided with the resident.  Services were furnished in a primary care center located in the outpatient department of a [facility].

## 2024-04-13 ENCOUNTER — HOSPITAL ENCOUNTER (EMERGENCY)
Facility: HOSPITAL | Age: 24
Discharge: HOME | End: 2024-04-13
Payer: MEDICAID

## 2024-04-13 VITALS
OXYGEN SATURATION: 99 % | HEART RATE: 94 BPM | SYSTOLIC BLOOD PRESSURE: 119 MMHG | RESPIRATION RATE: 18 BRPM | DIASTOLIC BLOOD PRESSURE: 79 MMHG | TEMPERATURE: 98 F

## 2024-04-13 PROBLEM — Z3A.20 PREGNANCY WITH 20 COMPLETED WEEKS GESTATION: Status: ACTIVE | Noted: 2024-04-13

## 2024-04-13 PROBLEM — O46.92: Status: ACTIVE | Noted: 2024-04-13

## 2024-04-13 PROCEDURE — 25000003 PHARM REV CODE 250: Performed by: OBSTETRICS & GYNECOLOGY

## 2024-04-13 PROCEDURE — 99284 EMERGENCY DEPT VISIT MOD MDM: CPT

## 2024-04-13 RX ORDER — ACETAMINOPHEN 500 MG
1000 TABLET ORAL
Status: COMPLETED | OUTPATIENT
Start: 2024-04-13 | End: 2024-04-13

## 2024-04-13 RX ADMIN — ACETAMINOPHEN 1000 MG: 500 TABLET ORAL at 05:04

## 2024-04-13 NOTE — ED NOTES
EFM and toco removed for discharge. Discharge instructions given on when to return to hospital. Verb understanding. Will follow up with primary OB on Monday to schedule visit this week. Discharge instructions given with assistance from phone  #[phone]. Ambulating independently off unit accompanied by significant other and daughter to private vehicle.

## 2024-04-13 NOTE — ED PROVIDER NOTES
Cc:  vaginal bleeding    HPI:  23 yr  @ 20 wk 4 d presents with vaginal bleeding beginning about midnight this morning.  Pt having some mild RLQ pain as well.  Preg complicated by twin gestation with demise of twin B around 13 weeks.  Preg also complicated by prior Csec.    Past Medical History:   Diagnosis Date    Hyperlipidemia     Hypertension      Past Surgical History:   Procedure Laterality Date    APPENDECTOMY       SECTION  2018     Review of patient's allergies indicates:   Allergen Reactions    Pineapple Hives and Shortness Of Breath     Patient's Medications   New Prescriptions    No medications on file   Previous Medications    ASPIRIN (BUFFERIN) 325 MG TAB    Take 81 mg by mouth once daily.    NIFEDIPINE (PROCARDIA-XL) 30 MG (OSM) 24 HR TABLET    Take 30 mg by mouth once daily.    PRENATAL VIT NO.124/IRON/FOLIC (PRENATAL VITAMIN ORAL)    Take by mouth.    PYRIDOXINE, VITAMIN B6, (B-6) 25 MG TAB    Take 1 tablet (25 mg total) by mouth every 8 (eight) hours as needed (nausea).   Modified Medications    No medications on file   Discontinued Medications    No medications on file     Social History     Tobacco Use    Smoking status: Never     Passive exposure: Current    Smokeless tobacco: Never   Substance Use Topics    Alcohol use: Not Currently    Drug use: Never     Family History   Problem Relation Name Age of Onset    Diabetes Maternal Grandmother      Diabetes Maternal Grandfather      No Known Problems Father      No Known Problems Mother       Review of Systems   Constitutional: Negative.    HENT: Negative.     Respiratory: Negative.     Cardiovascular: Negative.    Gastrointestinal: Negative.    Genitourinary: Negative.    Musculoskeletal: Negative.    Neurological: Negative.    Psychiatric/Behavioral: Negative.       Vitals:    24 0448 24 0450 24 0453 24 0454   BP:       Pulse: 92 95 99 97   Resp:       Temp:       SpO2: 98%  99%      Physical Exam:    Constitutional: She is oriented to person, place, and time. She appears well-developed and well-nourished. No distress.    HENT:   Head: Normocephalic and atraumatic.     Neck: No thyromegaly present.     Pulmonary/Chest: Effort normal. No respiratory distress.        Abdominal: Soft. She exhibits no distension and no mass. There is no abdominal tenderness. There is no rebound and no guarding.                 Neurological: She is alert and oriented to person, place, and time. No cranial nerve deficit. Coordination normal.    Skin: She is not diaphoretic.    Psychiatric: She has a normal mood and affect. Her behavior is normal. Judgment and thought content normal.     Pelvic exam:    Normal external genitalia  Cervix:  closed, thick; brown d/c noted on glove    FHT's:  140's  [place]:  no ctx's    Assessment/Plan  Active Hospital Problems    Diagnosis  POA    *Antepartum hemorrhage, second trimester [O46.92]  Yes    Pregnancy with 20 completed weeks gestation [Z3A.20]  Not Applicable    1. Fetal demise (twin A) before 22 weeks with retention of dead fetus [O36.4XX0]  Yes      Resolved Hospital Problems   No resolved problems to display.     On brown d/c noted; no active bleeding seen; FHT's documented for twin B  F/u prn; keep current clinic appt

## 2024-04-16 ENCOUNTER — HOSPITAL ENCOUNTER (INPATIENT)
Facility: HOSPITAL | Age: 24
LOS: 29 days | Discharge: HOME | End: 2024-05-15
Attending: OBSTETRICS & GYNECOLOGY | Admitting: OBSTETRICS & GYNECOLOGY
Payer: MEDICAID

## 2024-04-16 DIAGNOSIS — O46.92 VAGINAL BLEEDING IN PREGNANCY, SECOND TRIMESTER: ICD-10-CM

## 2024-04-16 DIAGNOSIS — O30.002 TWIN GESTATION WITH UNKNOWN NUMBER OF PLACENTAS AND AMNIOTIC SACS IN SECOND TRIMESTER: ICD-10-CM

## 2024-04-16 DIAGNOSIS — O42.919 PRETERM PREMATURE RUPTURE OF MEMBRANES (PPROM) WITH UNKNOWN ONSET OF LABOR: ICD-10-CM

## 2024-04-16 DIAGNOSIS — O36.4XX0 FETAL DEMISE BEFORE 22 WEEKS WITH RETENTION OF DEAD FETUS: ICD-10-CM

## 2024-04-16 DIAGNOSIS — O45.90 PLACENTAL ABRUPTION AFFECTING DELIVERY: Primary | ICD-10-CM

## 2024-04-16 DIAGNOSIS — O42.919 PRETERM PREMATURE RUPTURE OF MEMBRANES, ANTEPARTUM: ICD-10-CM

## 2024-04-16 DIAGNOSIS — Z98.891 STATUS POST REPEAT LOW TRANSVERSE CESAREAN SECTION: ICD-10-CM

## 2024-04-16 DIAGNOSIS — O46.92: ICD-10-CM

## 2024-04-16 DIAGNOSIS — O10.919 CHRONIC HYPERTENSION AFFECTING PREGNANCY: ICD-10-CM

## 2024-04-16 DIAGNOSIS — O41.02X2: ICD-10-CM

## 2024-04-16 DIAGNOSIS — O34.219 PREVIOUS CESAREAN DELIVERY AFFECTING PREGNANCY: ICD-10-CM

## 2024-04-16 DIAGNOSIS — Z34.90 PREGNANCY: ICD-10-CM

## 2024-04-16 LAB
APPEARANCE UR: CLEAR
BACTERIA #/AREA URNS AUTO: ABNORMAL /HPF
BASOPHILS # BLD AUTO: 0.04 X10(3)/MCL
BASOPHILS NFR BLD AUTO: 0.4 %
BILIRUB UR QL STRIP.AUTO: NEGATIVE
C TRACH DNA SPEC QL NAA+PROBE: NOT DETECTED
CLUE CELLS VAG QL WET PREP: ABNORMAL
COLOR UR AUTO: COLORLESS
CTP QC/QA: YES
EOSINOPHIL # BLD AUTO: 0.09 X10(3)/MCL (ref 0–0.9)
EOSINOPHIL NFR BLD AUTO: 1 %
ERYTHROCYTE [DISTWIDTH] IN BLOOD BY AUTOMATED COUNT: 12.9 % (ref 11.5–17)
GLUCOSE UR QL STRIP.AUTO: NORMAL
GROUP & RH: NORMAL
HCT VFR BLD AUTO: 32.2 % (ref 37–47)
HGB BLD-MCNC: 11 G/DL (ref 12–16)
IMM GRANULOCYTES # BLD AUTO: 0.04 X10(3)/MCL (ref 0–0.04)
IMM GRANULOCYTES NFR BLD AUTO: 0.4 %
INDIRECT COOMBS: NORMAL
KETONES UR QL STRIP.AUTO: NEGATIVE
LEUKOCYTE ESTERASE UR QL STRIP.AUTO: NEGATIVE
LYMPHOCYTES # BLD AUTO: 2.01 X10(3)/MCL (ref 0.6–4.6)
LYMPHOCYTES NFR BLD AUTO: 22 %
MCH RBC QN AUTO: 29.7 PG (ref 27–31)
MCHC RBC AUTO-ENTMCNC: 34.2 G/DL (ref 33–36)
MCV RBC AUTO: 87 FL (ref 80–94)
MONOCYTES # BLD AUTO: 0.53 X10(3)/MCL (ref 0.1–1.3)
MONOCYTES NFR BLD AUTO: 5.8 %
MUCOUS THREADS URNS QL MICRO: ABNORMAL /LPF
N GONORRHOEA DNA SPEC QL NAA+PROBE: NOT DETECTED
NEUTROPHILS # BLD AUTO: 6.42 X10(3)/MCL (ref 2.1–9.2)
NEUTROPHILS NFR BLD AUTO: 70.4 %
NITRITE UR QL STRIP.AUTO: NEGATIVE
NRBC BLD AUTO-RTO: 0 %
PH UR STRIP.AUTO: 6.5 [PH]
PLATELET # BLD AUTO: 285 X10(3)/MCL (ref 130–400)
PMV BLD AUTO: 8.7 FL (ref 7.4–10.4)
PROT UR QL STRIP.AUTO: NEGATIVE
RBC # BLD AUTO: 3.7 X10(6)/MCL (ref 4.2–5.4)
RBC #/AREA URNS AUTO: ABNORMAL /HPF
RBC UR QL AUTO: ABNORMAL
RUPTURE OF MEMBRANE: POSITIVE
SOURCE (OHS): NORMAL
SP GR UR STRIP.AUTO: 1 (ref 1–1.03)
SPECIMEN OUTDATE: NORMAL
SQUAMOUS #/AREA URNS LPF: ABNORMAL /HPF
T VAGINALIS VAG QL WET PREP: ABNORMAL
UROBILINOGEN UR STRIP-ACNC: NORMAL
WBC # SPEC AUTO: 9.13 X10(3)/MCL (ref 4.5–11.5)
WBC #/AREA URNS AUTO: ABNORMAL /HPF
WBC #/AREA VAG WET PREP: ABNORMAL
YEAST SPEC QL WET PREP: ABNORMAL

## 2024-04-16 PROCEDURE — 87081 CULTURE SCREEN ONLY: CPT

## 2024-04-16 PROCEDURE — 99233 SBSQ HOSP IP/OBS HIGH 50: CPT | Mod: ,,, | Performed by: NURSE PRACTITIONER

## 2024-04-16 PROCEDURE — 25000003 PHARM REV CODE 250: Performed by: NURSE PRACTITIONER

## 2024-04-16 PROCEDURE — 85025 COMPLETE CBC W/AUTO DIFF WBC: CPT

## 2024-04-16 PROCEDURE — 81001 URINALYSIS AUTO W/SCOPE: CPT

## 2024-04-16 PROCEDURE — 63600175 PHARM REV CODE 636 W HCPCS

## 2024-04-16 PROCEDURE — 87210 SMEAR WET MOUNT SALINE/INK: CPT

## 2024-04-16 PROCEDURE — 87491 CHLMYD TRACH DNA AMP PROBE: CPT

## 2024-04-16 PROCEDURE — 86901 BLOOD TYPING SEROLOGIC RH(D): CPT

## 2024-04-16 PROCEDURE — 99285 EMERGENCY DEPT VISIT HI MDM: CPT | Mod: 25

## 2024-04-16 PROCEDURE — 25000003 PHARM REV CODE 250

## 2024-04-16 PROCEDURE — 11000001 HC ACUTE MED/SURG PRIVATE ROOM

## 2024-04-16 PROCEDURE — 63700000 PHARM REV CODE 250 ALT 637 W/O HCPCS

## 2024-04-16 RX ORDER — NIFEDIPINE 30 MG/1
30 TABLET, EXTENDED RELEASE ORAL DAILY
Status: DISCONTINUED | OUTPATIENT
Start: 2024-04-16 | End: 2024-05-11

## 2024-04-16 RX ORDER — SIMETHICONE 80 MG
1 TABLET,CHEWABLE ORAL EVERY 6 HOURS PRN
Status: DISCONTINUED | OUTPATIENT
Start: 2024-04-16 | End: 2024-05-11

## 2024-04-16 RX ORDER — ONDANSETRON 4 MG/1
8 TABLET, ORALLY DISINTEGRATING ORAL EVERY 8 HOURS PRN
Status: DISCONTINUED | OUTPATIENT
Start: 2024-04-16 | End: 2024-04-20

## 2024-04-16 RX ORDER — AMOXICILLIN 500 MG/1
500 CAPSULE ORAL EVERY 8 HOURS
Status: COMPLETED | OUTPATIENT
Start: 2024-04-18 | End: 2024-04-26

## 2024-04-16 RX ORDER — PRENATAL WITH FERROUS FUM AND FOLIC ACID 3080; 920; 120; 400; 22; 1.84; 3; 20; 10; 1; 12; 200; 27; 25; 2 [IU]/1; [IU]/1; MG/1; [IU]/1; MG/1; MG/1; MG/1; MG/1; MG/1; MG/1; UG/1; MG/1; MG/1; MG/1; MG/1
1 TABLET ORAL DAILY
Status: DISCONTINUED | OUTPATIENT
Start: 2024-04-16 | End: 2024-05-11

## 2024-04-16 RX ORDER — AZITHROMYCIN 250 MG/1
1000 TABLET, FILM COATED ORAL ONCE
Status: COMPLETED | OUTPATIENT
Start: 2024-04-16 | End: 2024-04-16

## 2024-04-16 RX ORDER — NIFEDIPINE 30 MG/1
30 TABLET, EXTENDED RELEASE ORAL DAILY
Status: DISCONTINUED | OUTPATIENT
Start: 2024-04-17 | End: 2024-04-16

## 2024-04-16 RX ORDER — AMOXICILLIN 250 MG
1 CAPSULE ORAL NIGHTLY PRN
Status: DISCONTINUED | OUTPATIENT
Start: 2024-04-16 | End: 2024-05-11

## 2024-04-16 RX ORDER — SODIUM CHLORIDE 0.9 % (FLUSH) 0.9 %
10 SYRINGE (ML) INJECTION
Status: DISCONTINUED | OUTPATIENT
Start: 2024-04-16 | End: 2024-05-11

## 2024-04-16 RX ORDER — AZITHROMYCIN 250 MG/1
1000 TABLET, FILM COATED ORAL ONCE
Status: COMPLETED | OUTPATIENT
Start: 2024-04-21 | End: 2024-04-21

## 2024-04-16 RX ORDER — DIPHENHYDRAMINE HCL 25 MG
25 CAPSULE ORAL EVERY 4 HOURS PRN
Status: DISCONTINUED | OUTPATIENT
Start: 2024-04-16 | End: 2024-05-11

## 2024-04-16 RX ORDER — ACETAMINOPHEN 325 MG/1
650 TABLET ORAL EVERY 6 HOURS PRN
Status: DISCONTINUED | OUTPATIENT
Start: 2024-04-16 | End: 2024-05-11

## 2024-04-16 RX ORDER — MUPIROCIN 20 MG/G
OINTMENT TOPICAL 2 TIMES DAILY
Status: CANCELLED | OUTPATIENT
Start: 2024-04-16 | End: 2024-04-21

## 2024-04-16 RX ORDER — DIPHENHYDRAMINE HYDROCHLORIDE 50 MG/ML
25 INJECTION INTRAMUSCULAR; INTRAVENOUS EVERY 4 HOURS PRN
Status: DISCONTINUED | OUTPATIENT
Start: 2024-04-16 | End: 2024-05-11

## 2024-04-16 RX ADMIN — AMPICILLIN SODIUM 2 G: 2 INJECTION, POWDER, FOR SOLUTION INTRAMUSCULAR; INTRAVENOUS at 12:04

## 2024-04-16 RX ADMIN — AZITHROMYCIN DIHYDRATE 1000 MG: 250 TABLET, FILM COATED ORAL at 12:04

## 2024-04-16 RX ADMIN — PRENATAL VITAMINS-IRON FUMARATE 27 MG IRON-FOLIC ACID 0.8 MG TABLET 1 TABLET: at 12:04

## 2024-04-16 RX ADMIN — NIFEDIPINE 30 MG: 30 TABLET, FILM COATED, EXTENDED RELEASE ORAL at 04:04

## 2024-04-16 RX ADMIN — AMPICILLIN SODIUM 2 G: 2 INJECTION, POWDER, FOR SOLUTION INTRAMUSCULAR; INTRAVENOUS at 04:04

## 2024-04-16 RX ADMIN — ONDANSETRON 8 MG: 4 TABLET, ORALLY DISINTEGRATING ORAL at 04:04

## 2024-04-16 NOTE — ED PROVIDER NOTES
SAEED NOTE     Admit Date: 2024  SAEED Physician: Arnoldo Montesinos  Primary OBGYN: [facility]    Admit Diagnosis/Chief Complaint: Vaginal Bleeding and Abdominal Pain      Chief Complaint   Patient presents with     at 21 weeks (twin pregnancy with only 1 twin living) c       Hospital Course:  Lily Mckeon is a 23 y.o.  at 21w0d presents complaining of spotting and cramping      Patient states has been complicated by Twin pregnancy and fetal demise  now [place] in this pregnancy.     Patient denies leakage of fluid, contractions, dysuria, and fever.  Fetal Movement: normal.    /78   Pulse 84   Temp 99.1 °F (37.3 °C)   Resp 18   LMP  (LMP Unknown)   Temp:  [99.1 °F (37.3 °C)] 99.1 °F (37.3 °C)  Pulse:  [84] 84  Resp:  [18] 18  BP: (124)/(78) 124/78    General: in no apparent distress  Abdominal: soft, nontender, nondistended, no abnormal masses, no epigastric pain FHT present  Back: lumbar tenderness absent   CVA tenderness none  Extremeties no redness or tenderness in the calves or thighs no edema    SSE:   SVE:      FHT:   150s  TOCO: Contractions irritability      LABS:   No results found for this or any previous visit (from the past 24 hour(s)).  [unfilled]     Imaging Results    None          ASSESMENT: Lily Mckeon is a 23 y.o.   at 21w0d with  bleeding in pregnancy     Observation in SAEED  Rh positive   Ultrasound pending  Labor precautions reviewed with patient  ER precautions reviewed with patient  Patient was given an opportunity to ask questions  Patient is to follow-up with her primary care physician        Discharge Diagnosis/Clinical Impression**:  bleeding in pregnancy  Status:Stable    Patient Instructions:       - Pt was given routine pregnancy instructions including to return to triage if she had any vaginal bleeding (other than spotting for the next 48hrs), any loss of fluid like her water broke, decreased fetal movement, or contractions Q 5min  lasting for 2 or more hours. Pt was also instructed to drink copious water. Patient voiced understanding of all these instructions and was subsequently discharged home.    She will follow up with her primary OB.      This note was created with the assistance of lark voice recognition software. There may be transcription errors as a result of using this technology however minimal. Effort has been made to assure accuracy of transcription but any obvious errors or omissions should be clarified with the author of the document.

## 2024-04-16 NOTE — PROGRESS NOTES
Consultation Note  Maternal Fetal Medicine          Subjective:         Lily Mckeon is a 23 y.o.  female with twin IUP at 21w0d (with known IUFD of Twin A since 16w EGA) who presented in early morning hours for leaking/bleeding.     She is well known to our [facility] service as we have been following her in our outpatient clinic since 16wks. She was sent to our office, then, for IUFD of twin A, possible birth defect of living twin, and cHTN on Nifedipine. At that visit, twin A was confirmed as an IUFD with measurements consistent with a 13w6d size fetus. Twin B had oligohydramnios with severely limited imaging (MVP 1.1cm). We initially suspected a mono/di pregnancy, however, we have been unable to confirm chorionicity due to severely limited imaging. She went to [place] for a second opinion. Dr Ochoa agreed with findings and poor prognosis.  3/12/24- Abnormal quad screen with severely elevated AFP. Discussed case with [place] for possibility of maternal pathology contributing to degree of elevation. Serial testing revealed a downward trending AFP making this possibility less likely. Suspected etiology is placental/fetal pathology.  3/26/24 and 4/10/24 - ROMPlus negative  24 (Last ultrasound at [facility] clinic): posterior placenta, MVP 2.0cm for twin B, several anatomical views incomplete due to poor imaging.    She presented to the SAEED early this morning with complaints of cramping, vaginal bleeding, and leaking fluid. She states she was at home sleeping and woke up to urinate. While walking to the bathroom, she started feeling fluid trickling down her legs. When she looked down, she saw watery blood dripping down her legs. She arrived at the hospital shortly after. ROMPlus was positive. Ultrasound performed showing a posterior placenta, footling breech presentation of living twin, EFW 410g, MVP 1.3cm. GBS collected. CBC without leukocytosis.     Since arrival, she reports having an episode of  leaking clear fluid (without blood) at approx 0930 requiring her to change pads. She has not had any vaginal bleeding since arriving at hospital. She reports mild, intermittent cramping (few times per hour) that has not changed in intensity. Denies vaginal/rectal pressure. Speculum exam was performed by primary team - no cervical dilation noted upon visual inspection. Latency antibiotics have been started. SCDs in place. Afebrile. Vital signs stable. FHTs confirmed on Twin B. [place] without regular uterine activity. She reports having mild headaches over the past few days that are easily relieved with OTC Tylenol. Denies any other symptoms.    Review of Systems   Constitutional: Negative for fever, chills, malaise, and fatigue.   HENT: Negative for headache.   Eyes: Negative for visual disturbances.   Respiratory: Negative for cough and shortness of breath.    Cardiovascular: Negative for chest pain, edema.  Gastrointestinal:  Negative for abd pain, N/V/D. Negative for constipation.   Obstetrical: Negative for vaginal bleeding and contractions. Positive for leaking clear fluid and mild, intermittent cramping.  Genitourinary: Negative for dysuria, frequency, urgency.   Musculoskeletal: Negative.    Neurological: Negative.    Psychiatric/Behavioral: Negative.      PMHx:   Past Medical History:   Diagnosis Date    Hyperlipidemia     Hypertension        PSHx:   Past Surgical History:   Procedure Laterality Date    APPENDECTOMY       SECTION  2018       All:   Review of patient's allergies indicates:   Allergen Reactions    Pineapple Hives and Shortness Of Breath       Meds:   Medications Prior to Admission   Medication Sig Dispense Refill Last Dose    NIFEdipine (PROCARDIA-XL) 30 MG (OSM) 24 hr tablet Take 30 mg by mouth once daily.   4/15/2024    prenatal vit no.124/iron/folic (PRENATAL VITAMIN ORAL) Take by mouth.   4/15/2024    aspirin (BUFFERIN) 325 MG Tab Take 81 mg by mouth once daily.        pyridoxine, vitamin B6, (B-6) 25 MG Tab Take 1 tablet (25 mg total) by mouth every 8 (eight) hours as needed (nausea). (Patient not taking: Reported on 3/19/2024) 12 tablet 0        SH:   Social History     Socioeconomic History    Marital status: Single   Tobacco Use    Smoking status: Never     Passive exposure: Current    Smokeless tobacco: Never   Substance and Sexual Activity    Alcohol use: Not Currently    Drug use: Never    Sexual activity: Yes     Partners: Male     Social Determinants of Health     Financial Resource Strain: Low Risk  (4/16/2024)    Overall Financial Resource Strain (CARDIA)     Difficulty of Paying Living Expenses: Not very hard   Recent Concern: Financial Resource Strain - High Risk (3/11/2024)    Overall Financial Resource Strain (CARDIA)     Difficulty of Paying Living Expenses: Hard   Food Insecurity: No Food Insecurity (4/16/2024)    Hunger Vital Sign     Worried About Running Out of Food in the Last Year: Never true     Ran Out of Food in the Last Year: Never true   Transportation Needs: Unmet Transportation Needs (4/16/2024)    PRAPARE - Transportation     Lack of Transportation (Medical): Yes     Lack of Transportation (Non-Medical): No   Physical Activity: Insufficiently Active (3/11/2024)    Exercise Vital Sign     Days of Exercise per Week: 2 days     Minutes of Exercise per Session: 10 min   Stress: No Stress Concern Present (4/16/2024)    [employer] [employer] - Occupational Stress Questionnaire     Feeling of Stress : Not at all   Social Connections: Unknown (3/11/2024)    Social Connection and Isolation Panel [NHANES]     Frequency of Communication with Friends and Family: More than three times a week     Frequency of Social Gatherings with Friends and Family: Once a week     Active Member of Clubs or Organizations: No     Attends Club or Organization Meetings: Never     Marital Status:    Housing Stability: Unknown (4/16/2024)    Housing Stability  Vital Sign     Unable to Pay for Housing in the Last Year: No     Homeless in the Last Year: No   Recent Concern: Housing Stability - High Risk (3/11/2024)    Housing Stability Vital Sign     Number of Places Lived in the Last Year: 3     Unstable Housing in the Last Year: No       FH:   Family History   Problem Relation Name Age of Onset    Diabetes Maternal Grandmother      Diabetes Maternal Grandfather      No Known Problems Father      No Known Problems Mother         OBHx:   OB History    Para Term  AB Living   2 1 1 0 0 1   SAB IAB Ectopic Multiple Live Births   0 0 0 0 1      # Outcome Date GA Lbr To/2nd Weight Sex Type Anes PTL Lv   2 Current            1 Term 18 38w0d  2.5 kg (5 lb 8.2 oz) F CS-LTranv EPI N MIRIAM      Complications: Nuchal cord affecting delivery       Objective:         Temp:  [99.1 °F (37.3 °C)] 99.1 °F (37.3 °C)  Pulse:  [83-86] 83  Resp:  [18] 18  SpO2:  [100 %] 100 %  BP: (124-134)/(78-89) 126/89    Gen: NAD, A&Ox3  Pulm: Unlabored breathing, LCTAB  Card: RRR  Abd: FHT present, soft, nondistended, nontender to palpation, gravid uterus palpable c/w gestational age  Extremities: Palpable peripheral pulses, no pedal edema, 2+ DTRs x 4    FHTs confirmed on admit: 148bpm  [place]: none  US: IUFD of Twin A, footling breech presentation (living twin), posterior placenta, MVP 2.0cm-Twin B. EFW 410g    Lab Review  Blood Type: O POS    Recent Results (from the past 24 hour(s))   POCT RUPTURE OF MEMBRANE    Collection Time: 24  7:05 AM   Result Value Ref Range    Rupture of Membrane Positive (A) Negative     Acceptable Yes    Type & Screen    Collection Time: 24 10:55 AM   Result Value Ref Range    Group & Rh O POS     Indirect Martir GEL NEG     Specimen Outdate 2024 23:59    Wet Prep, Genital    Collection Time: 24 11:33 AM    Specimen: Cervicovaginal; Genital   Result Value Ref Range    WBC, Wet Prep Few (A) None Seen    Clue Cells, Wet  Prep None Seen None Seen    Trichomonas, Wet Prep None Seen None Seen    Yeast, Wet Prep None Seen None Seen   CBC with Differential    Collection Time: 24 11:59 AM   Result Value Ref Range    WBC 9.13 4.50 - 11.50 x10(3)/mcL    RBC 3.70 (L) 4.20 - 5.40 x10(6)/mcL    Hgb 11.0 (L) 12.0 - 16.0 g/dL    Hct 32.2 (L) 37.0 - 47.0 %    MCV 87.0 80.0 - 94.0 fL    MCH 29.7 27.0 - 31.0 pg    MCHC 34.2 33.0 - 36.0 g/dL    RDW 12.9 11.5 - 17.0 %    Platelet 285 130 - 400 x10(3)/mcL    MPV 8.7 7.4 - 10.4 fL    Neut % 70.4 %    Lymph % 22.0 %    Mono % 5.8 %    Eos % 1.0 %    Basophil % 0.4 %    Lymph # 2.01 0.6 - 4.6 x10(3)/mcL    Neut # 6.42 2.1 - 9.2 x10(3)/mcL    Mono # 0.53 0.1 - 1.3 x10(3)/mcL    Eos # 0.09 0 - 0.9 x10(3)/mcL    Baso # 0.04 <=0.2 x10(3)/mcL    IG# 0.04 0 - 0.04 x10(3)/mcL    IG% 0.4 %    NRBC% 0.0 %       Assessment:       23 y.o.  at 21w0d weeks gestation admitted for twin gestation with PPROM of twin B, vaginal bleeding, IUFD of Twin A diagnosed at 16w, cHTN, elevated msAFP    There are no hospital problems to display for this patient.       Plan:     1. Twins - previable PPROM Twin B  Patient's evaluation and exam is consistent with previable PPROM (rupture of membranes prior to 22 weeks 6 days gestation). Patient was counseled regarding the extremely poor prognosis of ruptured membranes at this early gestational age. We reviewed the maternal risks of previable premature rupture of membranes which include but are not limited to chorioamnionitis, endometritis, sepsis, potential need for hysterectomy and loss of fertility, VTE, and maternal death. We reviewed the fetal risks which include stillbirth, cord prolapse, infection, pulmonary hypoplasia, risk for musculoskeletal and facial anomalies due to severe oligohydramnios, and  death. We reviewed routine pulmonary maturation and the function of amniotic fluid in the normal growth of fetal lungs. We reviewed the unlikely chance of  membranes resealing from spontaneous rupture. I counseled the patient regarding management options which prior to 22 weeks (ie gestational age < 21 weeks 6 days) include conservative management, induction of labor, or dilation and evacuation. At a gestational age of 22 weeks 0 days and greater, conservative management is recommended at [facility].  Recommendations:  - Given the risk for labor, admit to the hospital for toco monitoring  - Latency antibiotics initiated.  - Confirm FHTs with doppler qshift. Continuous toco monitoring due to patient reporting intermittent cramping.  - Monitor for s/s infection, abruption. Currently afebrile. No evidence of leukocytosis.  - EFW q3 weeks (completed 4/16/24). No evidence of placenta previa on most recent ultrasound.  - Once weekly AFV/presentation, if admitted.   - Prior to PPROM and this hospital admission, we had extensively discussed the high risk for fetal morbidity/mortality given severe oligohydramnios since 16w and significantly elevated AFP. These risks are compounded by PPROM and we discussed this even further today utilizing translating services with her significant other present in the room. They were without questions and verbalized understanding.   - VTE prophylaxis (SCDs ordered)  -Dr Calvin will be returning on Thursday. Lily understands we will re-evaluate her clinical status/fetal assessment upon Dr Calvin' arrival with further recommendations regarding plan of care/disposition, at that time, given the complexity of her case.    2. cHTN  - Continue home med of Nifedipine 30mg XL daily.  - Monitor Bps as ordered.  - If she has a mild headache, may administer Tylenol PRN.    3. BMI 32.59  -Complicates all aspects of care      Thank you for allowing us to participate in the care of your patient. If you have any questions/concerns, please do not hesitate to contact us.       Randi Cooper, MSN, APRN, WHNP-BC  Maternal Fetal Medicine

## 2024-04-16 NOTE — H&P
HISTORY AND PHYSICAL                                                OBSTETRICS          Subjective:      Lily Mckeon is a 23 y.o.  female with IUP at 21w0d weeks twin gestation B 7 wk ultrasound . The pregnancy is complicated by twin gestation with retained fetal demise of twin A at 13 weeks,  twin B with oligohydramnios and CHTN.     Pt states that she experienced several episodes vaginal bleeding with cramping since yesterday. She describes spotting that was less than a period that would come and go. She noted it on toilet paper and in underwear. The cramping has been intermittent. She denies any recent sexual activity or abdominal trauma.     Care this pregnancy has been with  [facility] and Dr. Calvin ( [facility])    PMHx:   Past Medical History:   Diagnosis Date    Hyperlipidemia     Hypertension        PSHx:   Past Surgical History:   Procedure Laterality Date    APPENDECTOMY       SECTION  2018       All:   Review of patient's allergies indicates:   Allergen Reactions    Pineapple Hives and Shortness Of Breath       Meds:   Medications Prior to Admission   Medication Sig Dispense Refill Last Dose    NIFEdipine (PROCARDIA-XL) 30 MG (OSM) 24 hr tablet Take 30 mg by mouth once daily.   4/15/2024    prenatal vit no.124/iron/folic (PRENATAL VITAMIN ORAL) Take by mouth.   4/15/2024    aspirin (BUFFERIN) 325 MG Tab Take 81 mg by mouth once daily.       pyridoxine, vitamin B6, (B-6) 25 MG Tab Take 1 tablet (25 mg total) by mouth every 8 (eight) hours as needed (nausea). (Patient not taking: Reported on 3/19/2024) 12 tablet 0        SH:   Social History     Socioeconomic History    Marital status: Single   Tobacco Use    Smoking status: Never     Passive exposure: Current    Smokeless tobacco: Never   Substance and Sexual Activity    Alcohol use: Not Currently    Drug use: Never    Sexual activity: Yes     Partners: Male     Social Determinants of Health     Financial Resource Strain: Low Risk   (2024)    Overall Financial Resource Strain (CARDIA)     Difficulty of Paying Living Expenses: Not very hard   Recent Concern: Financial Resource Strain - High Risk (3/11/2024)    Overall Financial Resource Strain (CARDIA)     Difficulty of Paying Living Expenses: Hard   Food Insecurity: No Food Insecurity (2024)    Hunger Vital Sign     Worried About Running Out of Food in the Last Year: Never true     Ran Out of Food in the Last Year: Never true   Transportation Needs: Unmet Transportation Needs (2024)    PRAPARE - Transportation     Lack of Transportation (Medical): Yes     Lack of Transportation (Non-Medical): No   Physical Activity: Insufficiently Active (3/11/2024)    Exercise Vital Sign     Days of Exercise per Week: 2 days     Minutes of Exercise per Session: 10 min   Stress: No Stress Concern Present (2024)    [employer] - Occupational Stress Questionnaire     Feeling of Stress : Not at all   Social Connections: Unknown (3/11/2024)    Social Connection and Isolation Panel [NHANES]     Frequency of Communication with Friends and Family: More than three times a week     Frequency of Social Gatherings with Friends and Family: Once a week     Active Member of Clubs or Organizations: No     Attends Club or Organization Meetings: Never     Marital Status:    Housing Stability: Unknown (2024)    Housing Stability Vital Sign     Unable to Pay for Housing in the Last Year: No     Homeless in the Last Year: No   Recent Concern: Housing Stability - High Risk (3/11/2024)    Housing Stability Vital Sign     Number of Places Lived in the Last Year: 3     Unstable Housing in the Last Year: No       FH:   Family History   Problem Relation Name Age of Onset    Diabetes Maternal Grandmother      Diabetes Maternal Grandfather      No Known Problems Father      No Known Problems Mother         OBHx:   OB History    Para Term  AB Living   2 1 1 0 0 1  "  SAB IAB Ectopic Multiple Live Births   0 0 0 0 1      # Outcome Date GA Lbr To/2nd Weight Sex Type Anes PTL Lv   2 Current            1 Term 18 38w0d  2.5 kg (5 lb 8.2 oz) F CS-LTranv EPI N MIRIAM      Complications: Nuchal cord affecting delivery       Objective:      /89   Pulse 86   Temp 99.1 °F (37.3 °C)   Resp 18   Ht 5' 3" (1.6 m)   Wt 83.5 kg (184 lb)   LMP  (LMP Unknown)   SpO2 100%   Breastfeeding No   BMI 32.59 kg/m²   Body mass index is 32.59 kg/m².    General:   alert and cooperative, resting in bed    HEENT:  normocephalic, atraumatic   Lungs:   clear to auscultation bilaterally   Heart:   regular rate and rhythm, S1, S2 normal   Abdomen:  gravid, non-tender   Extremities non-tender, no edema   Sterile speculum exam :   Normal external vulva without blood , discharge or lesions noted. Normal pink vaginal mucosa , thick dark brown discharge un the right fornix and posterior fornix, cervix visualized closed. Cervix friable on examination. No blood or amniotic fluid noted coming from the internal os        FHT confirmed 150 bpm   [place]: no contractions           Lab Review  Blood Type O POS/neg   GBBS: unknown   Rubella:  immune  RPR: neg   HIV: neg   HepB:  neg        Imaging  :   Living fetus : footling breech , posterior placenta , 148 bpm   MVP 1.3   EFW 14 oz  Impression:  Live intrauterine fetus has average ultrasound age 21 weeks 1 day with oligohydramnios.  IUFD of 2nd fetus.      Lab Results   Component Value Date    WBC 11.58 (H) 03/15/2024    HGB 10.7 (L) 03/15/2024    HCT 31.0 (L) 03/15/2024    MCV 85.4 03/15/2024     03/15/2024           Assessment:     23 y.o.  at 21w0d weeks mono-di twin gestation with retained demise of twin A, admitted for PPROM        Plan:     # PPROM   - Positive ROM +   - latency antibiotics : Azithromy 1 g on day 1 and day 5 , ampicillin IV 2 grams q 6 hours for 48 hours followed by amoxicillin 500 mg TID ( day 3-10 )   - GC/ CT " collected  - GBS collected  - wet prep collected     # Chronic HTN   - continue daily Procardia 30 XL   - ASA 81 daily     # Mono-Di twins with retained fetal demise of Twin A   - daily FHT   - PNV daily         Discussed with Dr. Perry Nelson MD   [facility], PGY2

## 2024-04-16 NOTE — PROGRESS NOTES
Antepartum Progress Note        Subjective:      Lily Mckeon is a 23 y.o.  @ 21w1d who is admitted for management of PPROM  in the setting of twin gestation with fetal demise of Twin A.      ROSHAN. Reports vaginal bleeding noted on toilet paper last night and this morning. She denies abdominal pain.     Objective:      Temp:  [98.2 °F (36.8 °C)-99.1 °F (37.3 °C)] 98.2 °F (36.8 °C)  Pulse:  [83-86] 83  Resp:  [18] 18  SpO2:  [100 %] 100 %  BP: (124-134)/(78-89) 126/89  Body mass index is 32.59 kg/m².     General: no acute distress  Resp: No increased WOB  CV: regular rate  Abd: soft, nt, nd, gravid  Ext: No erythema or calf tenderness      FHT: 150's @ 0830     Assessment/Plan     1. IUP at  @ 21w1d  admitted for management of PPROM .    Group & Rh   Date Value Ref Range Status   2024 O POS  Final     # PPROM   - Positive ROM +   - latency antibiotics :   Azithromycin 1 g on day 1 and day 5   ampicillin IV 2 grams q 6 hours for 48 hours followed by amoxicillin 500 mg TID ( day 3-10 )   - GC/ CT : negative   - GBS pending   - wet prep: negative   - MFM consulted . Will follow recs      # Chronic HTN   - continue daily Procardia 30 XL   - ASA 81 daily   - will continue to monitor blood pressures      # Mono-Di twins with retained fetal demise of Twin A   - FHT q shift   - PNV daily            Discussed with Dr. Perry Nelson MD   [facility], PGY2

## 2024-04-17 DIAGNOSIS — O10.919 CHRONIC HYPERTENSION AFFECTING PREGNANCY: ICD-10-CM

## 2024-04-17 DIAGNOSIS — O36.4XX0 FETAL DEMISE BEFORE 22 WEEKS WITH RETENTION OF DEAD FETUS: Primary | ICD-10-CM

## 2024-04-17 PROCEDURE — 25000003 PHARM REV CODE 250

## 2024-04-17 PROCEDURE — 63600175 PHARM REV CODE 636 W HCPCS

## 2024-04-17 PROCEDURE — A4216 STERILE WATER/SALINE, 10 ML: HCPCS

## 2024-04-17 PROCEDURE — 25000003 PHARM REV CODE 250: Performed by: NURSE PRACTITIONER

## 2024-04-17 PROCEDURE — 11000001 HC ACUTE MED/SURG PRIVATE ROOM

## 2024-04-17 PROCEDURE — 99233 SBSQ HOSP IP/OBS HIGH 50: CPT | Mod: ,,, | Performed by: NURSE PRACTITIONER

## 2024-04-17 RX ADMIN — AMPICILLIN SODIUM 2 G: 2 INJECTION, POWDER, FOR SOLUTION INTRAMUSCULAR; INTRAVENOUS at 12:04

## 2024-04-17 RX ADMIN — AMPICILLIN SODIUM 2 G: 2 INJECTION, POWDER, FOR SOLUTION INTRAMUSCULAR; INTRAVENOUS at 05:04

## 2024-04-17 RX ADMIN — ONDANSETRON 8 MG: 4 TABLET, ORALLY DISINTEGRATING ORAL at 08:04

## 2024-04-17 RX ADMIN — NIFEDIPINE 30 MG: 30 TABLET, FILM COATED, EXTENDED RELEASE ORAL at 09:04

## 2024-04-17 RX ADMIN — Medication 10 ML: at 09:04

## 2024-04-17 RX ADMIN — AMPICILLIN SODIUM 2 G: 2 INJECTION, POWDER, FOR SOLUTION INTRAMUSCULAR; INTRAVENOUS at 11:04

## 2024-04-17 RX ADMIN — PRENATAL VITAMINS-IRON FUMARATE 27 MG IRON-FOLIC ACID 0.8 MG TABLET 1 TABLET: at 09:04

## 2024-04-17 RX ADMIN — ACETAMINOPHEN 650 MG: 325 TABLET, FILM COATED ORAL at 01:04

## 2024-04-17 NOTE — PROGRESS NOTES
Consultation Note  Maternal Fetal Medicine          Subjective:         Lily Mckeon is a 23 y.o.  female with twin IUP at 21w1d (with known IUFD of Twin A since 16w EGA) who presented 24 for leaking/bleeding.   She is well known to our [facility] service as we have been following her in our outpatient clinic since 16wks. She was sent to our office, then, for IUFD of twin A, possible birth defect of living twin, and cHTN on Nifedipine. At that visit, twin A was confirmed as an IUFD with measurements consistent with a 13w6d size fetus. Twin B had oligohydramnios with severely limited imaging (MVP 1.1cm). We initially suspected a mono/di pregnancy, however, we have been unable to confirm chorionicity due to severely limited imaging. She went to [place] for a second opinion. Dr Ochoa agreed with findings and poor prognosis.  3/12/24- Abnormal quad screen with severely elevated AFP. Discussed case with [place] for possibility of maternal pathology contributing to degree of elevation. Serial testing revealed a downward trending AFP making this possibility less likely. Suspected etiology is placental/fetal pathology.  3/26/24 and 4/10/24 - ROMPlus negative  24 (Last ultrasound at [facility] clinic): posterior placenta, MVP 2.0cm for twin B, several anatomical views incomplete due to poor imaging.  She presented to the [facility] early 24 with complaints of cramping, vaginal bleeding, and leaking fluid. She states she was at home sleeping and woke up to urinate. While walking to the bathroom, she started feeling fluid trickling down her legs. When she looked down, she saw watery blood dripping down her legs. She arrived at the hospital shortly after. ROMPlus was positive. Ultrasound performed showing a posterior placenta, footling breech presentation of living twin, EFW 410g, MVP 1.3cm. GBS collected. CBC without leukocytosis.   Since arrival, she reports having an episode of leaking clear fluid  (without blood) at approx 0930 requiring her to change pads. She has not had any vaginal bleeding since arriving at hospital. She reports mild, intermittent cramping (few times per hour) that has not changed in intensity. Denies vaginal/rectal pressure. Speculum exam was performed by primary team - no cervical dilation noted upon visual inspection. Latency antibiotics have been started. SCDs in place. Afebrile. Vital signs stable. FHTs confirmed on Twin B. [place] without regular uterine activity. She reports having mild headaches over the past few days that are easily relieved with OTC Tylenol. Denies any other symptoms.  -----------------------  Interval history:  24- She reports one diarrhea episode last night, but attributes this to antibiotics. She did not have any leaking or bleeding yesterday afternoon/last night. Continuous toco discontinued due to patient reporting she no longer felt abd cramping yesterday afternoon. Early this morning, she had one episode of leaking a small amount of clear, watery, red blood when ambulating to the bathroom. Did not see any blood in toilet upon urination. She has not had any leaking/bleeding since then. She denies abdominal cramping/pain/tenderness/contractions. Denies malaise, chills, aches, foul vaginal odor. Reports fetal movement. Afebrile. Vital signs stable. FHTs being checked qshift. She reports since becoming pregnant, she has nausea and sometimes vomiting early in the morning which dissipates throughout the day. She reports nausea this morning. Explained there are nausea meds available upon request (Zofran PRN ordered on admit). Will notify nursing staff she requests Zofran. Ok to ambulate to take quick shower this AM with assistance of nursing staff.      PMHx:   Past Medical History:   Diagnosis Date    Hyperlipidemia     Hypertension        PSHx:   Past Surgical History:   Procedure Laterality Date    APPENDECTOMY       SECTION  2018       All:    Review of patient's allergies indicates:   Allergen Reactions    [place] Hives and Shortness Of Breath       Meds:   Medications Prior to Admission   Medication Sig Dispense Refill Last Dose    NIFEdipine (PROCARDIA-XL) 30 MG (OSM) 24 hr tablet Take 30 mg by mouth once daily.   4/15/2024    prenatal vit no.124/iron/folic (PRENATAL VITAMIN ORAL) Take by mouth.   4/15/2024    aspirin (BUFFERIN) 325 MG Tab Take 81 mg by mouth once daily.       pyridoxine, vitamin B6, (B-6) 25 MG Tab Take 1 tablet (25 mg total) by mouth every 8 (eight) hours as needed (nausea). (Patient not taking: Reported on 3/19/2024) 12 tablet 0        SH:   Social History     Socioeconomic History    Marital status: Single   Tobacco Use    Smoking status: Never     Passive exposure: Current    Smokeless tobacco: Never   Substance and Sexual Activity    Alcohol use: Not Currently    Drug use: Never    Sexual activity: Yes     Partners: Male     Social Determinants of Health     Financial Resource Strain: Low Risk  (4/16/2024)    Overall Financial Resource Strain (CARDIA)     Difficulty of Paying Living Expenses: Not very hard   Recent Concern: Financial Resource Strain - High Risk (3/11/2024)    Overall Financial Resource Strain (CARDIA)     Difficulty of Paying Living Expenses: Hard   Food Insecurity: No Food Insecurity (4/16/2024)    Hunger Vital Sign     Worried About Running Out of Food in the Last Year: Never true     Ran Out of Food in the Last Year: Never true   Transportation Needs: Unmet Transportation Needs (4/16/2024)    PRAPARE - Transportation     Lack of Transportation (Medical): Yes     Lack of Transportation (Non-Medical): No   Physical Activity: Insufficiently Active (3/11/2024)    Exercise Vital Sign     Days of Exercise per Week: 2 days     Minutes of Exercise per Session: 10 min   Stress: No Stress Concern Present (4/16/2024)    [employer] [place] [employer] - Occupational Stress Questionnaire     Feeling of Stress  : Not at all   Social Connections: Unknown (3/11/2024)    Social Connection and Isolation Panel [NHANES]     Frequency of Communication with Friends and Family: More than three times a week     Frequency of Social Gatherings with Friends and Family: Once a week     Active Member of Clubs or Organizations: No     Attends Club or Organization Meetings: Never     Marital Status:    Housing Stability: Unknown (2024)    Housing Stability Vital Sign     Unable to Pay for Housing in the Last Year: No     Homeless in the Last Year: No   Recent Concern: Housing Stability - High Risk (3/11/2024)    Housing Stability Vital Sign     Number of Places Lived in the Last Year: 3     Unstable Housing in the Last Year: No       FH:   Family History   Problem Relation Name Age of Onset    Diabetes Maternal Grandmother      Diabetes Maternal Grandfather      No Known Problems Father      No Known Problems Mother         OBHx:   OB History    Para Term  AB Living   2 1 1 0 0 1   SAB IAB Ectopic Multiple Live Births   0 0 0 0 1      # Outcome Date GA Lbr To/2nd Weight Sex Type Anes PTL Lv   2 Current            1 Term 18 38w0d  2.5 kg (5 lb 8.2 oz) F CS-LTranv EPI N MIRIAM      Complications: Nuchal cord affecting delivery       Objective:         Temp:  [97.9 °F (36.6 °C)-98.6 °F (37 °C)] 98.6 °F (37 °C)  Pulse:  [] 106  Resp:  [16-18] 16  BP: (117-128)/(77-89) 117/79    Gen: NAD, A&Ox3  Pulm: Unlabored breathing, LCTAB  Card: RRR  Abd: FHT present, soft, nondistended, nontender to palpation, gravid uterus palpable c/w gestational age  Extremities: Palpable peripheral pulses, no pedal edema, 2+ DTRs x 4    Nursing staff to check FHTs this AM  [place]: none  US: IUFD of Twin A, footling breech presentation (living twin), posterior placenta, MVP 2.0cm-Twin B. EFW 410g    Lab Review  Blood Type: O POS    Recent Results (from the past 24 hour(s))   Type & Screen    Collection Time: 24 10:55 AM   Result  Value Ref Range    Group & Rh O POS     Indirect Martir GEL NEG     Specimen Outdate 04/19/2024 23:59    Chlamydia/GC, PCR    Collection Time: 04/16/24 11:33 AM    Specimen: Endocervical; Genital   Result Value Ref Range    Chlamydia trachomatis PCR Not Detected Not Detected    N. gonorrhea PCR Not Detected Not Detected    Source endocervical    Wet Prep, Genital    Collection Time: 04/16/24 11:33 AM    Specimen: Cervicovaginal; Genital   Result Value Ref Range    WBC, Wet Prep Few (A) None Seen    Clue Cells, Wet Prep None Seen None Seen    Trichomonas, Wet Prep None Seen None Seen    Yeast, Wet Prep None Seen None Seen   CBC with Differential    Collection Time: 04/16/24 11:59 AM   Result Value Ref Range    WBC 9.13 4.50 - 11.50 x10(3)/mcL    RBC 3.70 (L) 4.20 - 5.40 x10(6)/mcL    Hgb 11.0 (L) 12.0 - 16.0 g/dL    Hct 32.2 (L) 37.0 - 47.0 %    MCV 87.0 80.0 - 94.0 fL    MCH 29.7 27.0 - 31.0 pg    MCHC 34.2 33.0 - 36.0 g/dL    RDW 12.9 11.5 - 17.0 %    Platelet 285 130 - 400 x10(3)/mcL    MPV 8.7 7.4 - 10.4 fL    Neut % 70.4 %    Lymph % 22.0 %    Mono % 5.8 %    Eos % 1.0 %    Basophil % 0.4 %    Lymph # 2.01 0.6 - 4.6 x10(3)/mcL    Neut # 6.42 2.1 - 9.2 x10(3)/mcL    Mono # 0.53 0.1 - 1.3 x10(3)/mcL    Eos # 0.09 0 - 0.9 x10(3)/mcL    Baso # 0.04 <=0.2 x10(3)/mcL    IG# 0.04 0 - 0.04 x10(3)/mcL    IG% 0.4 %    NRBC% 0.0 %   Urinalysis, Reflex to Urine Culture    Collection Time: 04/16/24  4:37 PM    Specimen: Urine   Result Value Ref Range    Color, UA Colorless Yellow, Light-Yellow, Colorless, Straw, Dark-Yellow    Appearance, UA Clear Clear    Specific Gravity, UA 1.003 (L) 1.005 - 1.030    pH, UA 6.5 5.0 - 8.5    Protein, UA Negative Negative    Glucose, UA Normal Negative, Normal    Ketones, UA Negative Negative    Blood, UA 1+ (A) Negative    Bilirubin, UA Negative Negative    Urobilinogen, UA Normal 0.2, 1.0, Normal    Nitrites, UA Negative Negative    Leukocyte Esterase, UA Negative Negative    WBC, UA 0-5  None Seen, 0-2, 3-5, 0-5 /HPF    Bacteria, UA Occasional (A) None Seen, Trace /HPF    Squamous Epithelial Cells, UA Trace None Seen /HPF    Mucous, UA Trace (A) None Seen /LPF    RBC, UA 0-5 None Seen, 0-2, 3-5, 0-5 /HPF       Assessment:       23 y.o.  at 21w1d weeks gestation admitted for twin gestation with PPROM of twin B, vaginal bleeding, IUFD of Twin A diagnosed at 16w, cHTN, elevated msAFP    Plan:     1. Twins - previable PPROM Twin B  Patient's evaluation and exam is consistent with previable PPROM (rupture of membranes prior to 22 weeks 6 days gestation). Patient was counseled regarding the extremely poor prognosis of ruptured membranes at this early gestational age. We reviewed the maternal risks of previable premature rupture of membranes which include but are not limited to chorioamnionitis, endometritis, sepsis, potential need for hysterectomy and loss of fertility, VTE, and maternal death. We reviewed the fetal risks which include stillbirth, cord prolapse, infection, pulmonary hypoplasia, risk for musculoskeletal and facial anomalies due to severe oligohydramnios, and  death. We reviewed routine pulmonary maturation and the function of amniotic fluid in the normal growth of fetal lungs. We reviewed the unlikely chance of membranes resealing from spontaneous rupture. I counseled the patient regarding management options which prior to 22 weeks (ie gestational age < 21 weeks 6 days) include conservative management, induction of labor, or dilation and evacuation. At a gestational age of 22 weeks 0 days and greater, conservative management is recommended at [facility].  Known IUFD of Twin A - diagnosed at 16w (fetus was measuring 13w6d at that time)  Recommendations:  - Given the risk for labor, admit to the hospital for toco monitoring  - Latency antibiotics initiated.  - Confirm FHTs (Twin B) with doppler qshift. Continuous toco monitoring due to patient reporting intermittent cramping.  -  Monitor for s/s infection, abruption. Currently afebrile. No evidence of leukocytosis.  - EFW q3 weeks (completed 4/16/24). No evidence of placenta previa on most recent ultrasound.  - Once weekly AFV/presentation, if admitted.   - Prior to PPROM and this hospital admission, we had extensively discussed the high risk for fetal morbidity/mortality given Twin B's severe oligohydramnios since 16w and significantly elevated AFP. These risks are compounded by PPROM and we discussed this even further today utilizing translating services with her significant other present in the room. They were without questions and verbalized understanding.   - VTE prophylaxis (SCDs ordered)  -Dr Calvin will be returning on Thursday. Lily understands we will re-evaluate her clinical status/fetal assessment upon Dr Calvin' arrival with further recommendations regarding plan of care/disposition, at that time, given the complexity of her case.    2. cHTN  - Continue home med of Nifedipine 30mg XL daily.  - Monitor Bps as ordered.  - If she has a mild headache, may administer Tylenol PRN.    3. BMI 32.59  -Complicates all aspects of care      Thank you for allowing us to participate in the care of your patient. If you have any questions/concerns, please do not hesitate to contact us.       Randi Cooper, MSN, APRN, WHNP-BC  Maternal Fetal Medicine

## 2024-04-18 PROCEDURE — 11000001 HC ACUTE MED/SURG PRIVATE ROOM

## 2024-04-18 PROCEDURE — 99233 SBSQ HOSP IP/OBS HIGH 50: CPT | Mod: ,,, | Performed by: OBSTETRICS & GYNECOLOGY

## 2024-04-18 PROCEDURE — 25000003 PHARM REV CODE 250

## 2024-04-18 PROCEDURE — 25000003 PHARM REV CODE 250: Performed by: NURSE PRACTITIONER

## 2024-04-18 PROCEDURE — 63600175 PHARM REV CODE 636 W HCPCS

## 2024-04-18 RX ADMIN — PRENATAL VITAMINS-IRON FUMARATE 27 MG IRON-FOLIC ACID 0.8 MG TABLET 1 TABLET: at 08:04

## 2024-04-18 RX ADMIN — AMOXICILLIN 500 MG: 500 CAPSULE ORAL at 09:04

## 2024-04-18 RX ADMIN — AMPICILLIN SODIUM 2 G: 2 INJECTION, POWDER, FOR SOLUTION INTRAMUSCULAR; INTRAVENOUS at 06:04

## 2024-04-18 RX ADMIN — SIMETHICONE 80 MG: 80 TABLET, CHEWABLE ORAL at 08:04

## 2024-04-18 RX ADMIN — SENNOSIDES AND DOCUSATE SODIUM 1 TABLET: 50; 8.6 TABLET ORAL at 08:04

## 2024-04-18 RX ADMIN — ONDANSETRON 8 MG: 4 TABLET, ORALLY DISINTEGRATING ORAL at 08:04

## 2024-04-18 RX ADMIN — NIFEDIPINE 30 MG: 30 TABLET, FILM COATED, EXTENDED RELEASE ORAL at 08:04

## 2024-04-18 NOTE — PROGRESS NOTES
Antepartum Progress Note        Subjective:      Lily Mckeon is a 23 y.o.  @ 21w2d who is admitted for management of PPROM  in the setting of twin gestation with fetal demise of Twin A.      ROSHAN. Reports improvement in vaginal bleeding. Reports brown spotting when she urinated this morning . But since showering , she has seen any additional bleeding. Denies dysuria . She denies abdominal pain.     Objective:      Temp:  [98 °F (36.7 °C)-98.6 °F (37 °C)] 98.1 °F (36.7 °C)  Pulse:  [] 78  Resp:  [16-18] 18  SpO2:  [98 %-99 %] 98 %  BP: (116-127)/(67-84) 127/84  Body mass index is 32.59 kg/m².     General: no acute distress, resting in bed   Resp: No increased WOB  CV: regular rate  Abd: soft, nt, nd, gravid  Ext: No erythema or calf tenderness      FHT: 155 @ 1948     Assessment/Plan     1. IUP at  @ 21w1d  admitted for management of PPROM .    Group & Rh   Date Value Ref Range Status   2024 O POS  Final     # PPROM   - Positive ROM +   - latency antibiotics :   Azithromycin 1 g on day 1 and day 5   S/p ampicillin IV 2 grams q 6 hours for 48 hours    D1  amoxicillin 500 mg TID ( day 3-10 )   - GC/ CT : negative   - GBS pending   - wet prep: negative   - MFM consulted . Will follow recs      # Chronic HTN   - continue daily Procardia 30 XL   - ASA 81 daily   - will continue to monitor blood pressures      # Mono-Di twins with retained fetal demise of Twin A   - FHT q shift   - PNV daily            Discussed with Dr. Perry Nelson MD   [facility], PGY2    Ambulatory

## 2024-04-19 LAB
BASOPHILS # BLD AUTO: 0.03 X10(3)/MCL
BASOPHILS NFR BLD AUTO: 0.3 %
EOSINOPHIL # BLD AUTO: 0.19 X10(3)/MCL (ref 0–0.9)
EOSINOPHIL NFR BLD AUTO: 1.9 %
ERYTHROCYTE [DISTWIDTH] IN BLOOD BY AUTOMATED COUNT: 12.8 % (ref 11.5–17)
GROUP & RH: NORMAL
HCT VFR BLD AUTO: 32.9 % (ref 37–47)
HGB BLD-MCNC: 11.2 G/DL (ref 12–16)
IMM GRANULOCYTES # BLD AUTO: 0.03 X10(3)/MCL (ref 0–0.04)
IMM GRANULOCYTES NFR BLD AUTO: 0.3 %
INDIRECT COOMBS: NORMAL
LYMPHOCYTES # BLD AUTO: 2.38 X10(3)/MCL (ref 0.6–4.6)
LYMPHOCYTES NFR BLD AUTO: 24.3 %
MCH RBC QN AUTO: 29.7 PG (ref 27–31)
MCHC RBC AUTO-ENTMCNC: 34 G/DL (ref 33–36)
MCV RBC AUTO: 87.3 FL (ref 80–94)
MONOCYTES # BLD AUTO: 0.57 X10(3)/MCL (ref 0.1–1.3)
MONOCYTES NFR BLD AUTO: 5.8 %
NEUTROPHILS # BLD AUTO: 6.6 X10(3)/MCL (ref 2.1–9.2)
NEUTROPHILS NFR BLD AUTO: 67.4 %
NRBC BLD AUTO-RTO: 0 %
PLATELET # BLD AUTO: 308 X10(3)/MCL (ref 130–400)
PMV BLD AUTO: 8.6 FL (ref 7.4–10.4)
RBC # BLD AUTO: 3.77 X10(6)/MCL (ref 4.2–5.4)
SPECIMEN OUTDATE: NORMAL
STREP B PCR (OHS): NOT DETECTED
WBC # SPEC AUTO: 9.8 X10(3)/MCL (ref 4.5–11.5)

## 2024-04-19 PROCEDURE — 02HV33Z INSERTION OF INFUSION DEVICE INTO SUPERIOR VENA CAVA, PERCUTANEOUS APPROACH: ICD-10-PCS | Performed by: OBSTETRICS & GYNECOLOGY

## 2024-04-19 PROCEDURE — 25000003 PHARM REV CODE 250: Performed by: NURSE PRACTITIONER

## 2024-04-19 PROCEDURE — C1751 CATH, INF, PER/CENT/MIDLINE: HCPCS

## 2024-04-19 PROCEDURE — 36569 INSJ PICC 5 YR+ W/O IMAGING: CPT

## 2024-04-19 PROCEDURE — 25000003 PHARM REV CODE 250

## 2024-04-19 PROCEDURE — 86850 RBC ANTIBODY SCREEN: CPT

## 2024-04-19 PROCEDURE — A4216 STERILE WATER/SALINE, 10 ML: HCPCS | Performed by: NURSE PRACTITIONER

## 2024-04-19 PROCEDURE — 99233 SBSQ HOSP IP/OBS HIGH 50: CPT | Mod: ,,, | Performed by: OBSTETRICS & GYNECOLOGY

## 2024-04-19 PROCEDURE — 85025 COMPLETE CBC W/AUTO DIFF WBC: CPT

## 2024-04-19 PROCEDURE — 11000001 HC ACUTE MED/SURG PRIVATE ROOM

## 2024-04-19 RX ORDER — SODIUM CHLORIDE 0.9 % (FLUSH) 0.9 %
10 SYRINGE (ML) INJECTION
Status: DISCONTINUED | OUTPATIENT
Start: 2024-04-19 | End: 2024-05-11

## 2024-04-19 RX ORDER — SODIUM CHLORIDE 0.9 % (FLUSH) 0.9 %
10 SYRINGE (ML) INJECTION EVERY 6 HOURS
Status: DISCONTINUED | OUTPATIENT
Start: 2024-04-19 | End: 2024-05-11

## 2024-04-19 RX ADMIN — SODIUM CHLORIDE, PRESERVATIVE FREE 10 ML: 5 INJECTION INTRAVENOUS at 06:04

## 2024-04-19 RX ADMIN — AMOXICILLIN 500 MG: 500 CAPSULE ORAL at 02:04

## 2024-04-19 RX ADMIN — NIFEDIPINE 30 MG: 30 TABLET, FILM COATED, EXTENDED RELEASE ORAL at 09:04

## 2024-04-19 RX ADMIN — ONDANSETRON 8 MG: 4 TABLET, ORALLY DISINTEGRATING ORAL at 06:04

## 2024-04-19 RX ADMIN — AMOXICILLIN 500 MG: 500 CAPSULE ORAL at 09:04

## 2024-04-19 RX ADMIN — AMOXICILLIN 500 MG: 500 CAPSULE ORAL at 05:04

## 2024-04-19 RX ADMIN — PRENATAL VITAMINS-IRON FUMARATE 27 MG IRON-FOLIC ACID 0.8 MG TABLET 1 TABLET: at 09:04

## 2024-04-19 NOTE — PROGRESS NOTES
Progress Note  Maternal Fetal Medicine          Subjective:         Lily Mckeon is a 23 y.o.  female with twin IUP at 21w2d (with known IUFD of Twin A since 16w EGA) who presented 24 for leaking/bleeding (oligohydramnios of twin B since early second trimester).     She is feeling well and has no complaints today. She is feeling better since admission and has no cramping or bleeding. She denies odor or chills.   Today we discussed the management of PPROM of baby B in detail and she does not feel comfortable being discharged home with plans for readmission at viability. She understands that intervention is recommended against until at least 23 weeks. She understands the poor prognosis for her surviving twin given the persistent oligohydramnios since 14 weeks.      Objective:         Temp:  [97.9 °F (36.6 °C)-98.2 °F (36.8 °C)] 98.1 °F (36.7 °C)  Pulse:  [78-94] 84  Resp:  [18-20] 18  BP: (114-133)/(60-84) 114/60    Gen: NAD, A&Ox3  Pulm: Unlabored breathing, LCTAB  Card: RRR  Abd: FHT present, soft, nondistended, nontender to palpation, gravid uterus palpable c/w gestational age  Extremities: Palpable peripheral pulses, no pedal edema, 2+ DTRs x 4    FHTs baby B +   [place]: none  US: IUFD of Twin A, footling breech presentation (living twin), posterior placenta, MVP 2.0cm-Twin B. EFW 410g      Lab Review  Blood Type: O POS    No results found for this or any previous visit (from the past 24 hour(s)).      Assessment:       23 y.o.  at 21w2d weeks gestation admitted for twin gestation with PPROM of twin B, vaginal bleeding, IUFD of Twin A diagnosed at 16w, cHTN, elevated msAFP    Plan:     1. Twins - previable PPROM Twin B  Patient's evaluation and exam is consistent with previable PPROM (rupture of membranes prior to 22 weeks 6 days gestation). Patient was counseled regarding the extremely poor prognosis of ruptured membranes at this early gestational age. We reviewed the maternal risks of  previable premature rupture of membranes which include but are not limited to chorioamnionitis, endometritis, sepsis, potential need for hysterectomy and loss of fertility, VTE, and maternal death. We reviewed the fetal risks which include stillbirth, cord prolapse, infection, pulmonary hypoplasia, risk for musculoskeletal and facial anomalies due to severe oligohydramnios, and  death. We reviewed routine pulmonary maturation and the function of amniotic fluid in the normal growth of fetal lungs. We reviewed the unlikely chance of membranes resealing from spontaneous rupture. I counseled the patient regarding management options which prior to 22 weeks (ie gestational age < 21 weeks 6 days) include conservative management, induction of labor, or dilation and evacuation. At a gestational age of 22 weeks 0 days and greater, conservative management is recommended at [facility].  Known IUFD of Twin A - diagnosed at 16w (fetus was measuring 13w6d at that time)    Recommendations:  - Latency antibiotics initiated.  - Confirm FHTs (Twin B) with doppler qshift. Continuous toco monitoring due to patient reporting intermittent cramping.  - Monitor for s/s infection, abruption. Currently afebrile. No evidence of leukocytosis- weekly CBC planned  - EFW q3 weeks (completed 24). No evidence of placenta previa on most recent ultrasound.  - Once weekly AFV/presentation, if admitted.   - Prior to PPROM and this hospital admission, we had extensively discussed the high risk for fetal morbidity/mortality given Twin B's severe oligohydramnios since 16w and significantly elevated AFP. These risks are compounded by PPROM and we discussed this even further today utilizing translating services with her significant other present in the room. They were without questions and verbalized understanding.   - VTE prophylaxis (SCDs ordered)  - I addressed the previable status and poor prognosis with the patient and she does not feel  comfortable being at home given the IUFD of baby A and guarded prognosis for baby B. She will remain inpatient until delivery.  - Steroids and magnesium at 23 weeks. Considering intervention at 23 weeks.    2. cHTN  - Continue home med of Nifedipine 30mg XL daily.  - Monitor Bps as ordered.  - If she has a mild headache, may administer Tylenol PRN.    3. BMI 32.59  -Complicates all aspects of care      Thank you for allowing us to participate in the care of your patient. If you have any questions/concerns, please do not hesitate to contact us.     Michelle Calvin MD  Maternal Fetal Medicine

## 2024-04-19 NOTE — PROCEDURES
"Lily Mckeon is a 23 y.o. female patient.    Temp: 97.5 °F (36.4 °C) (04/19/24 1134)  Pulse: 92 (04/19/24 1134)  Resp: 16 (04/19/24 1134)  BP: 113/70 (04/19/24 1134)  SpO2: 98 % (04/17/24 1946)  Weight: 83.5 kg (184 lb) (04/16/24 0900)  Height: 5' 3" (160 cm) (04/16/24 0900)    PICC  Time out: Immediately prior to procedure a time out was called to verify the correct patient, procedure, equipment, support staff and site/side marked as required  Indications: med administration  Preparation: skin prepped with ChloraPrep  Skin prep agent dried: skin prep agent completely dried prior to procedure  Sterile barriers: all five maximum sterile barriers used - cap, mask, sterile gown, sterile gloves, and large sterile sheet  Hand hygiene: hand hygiene performed prior to central venous catheter insertion  Location details: right brachial  Catheter type: double lumen  Catheter size: 5 Fr  Catheter Length: 35cm    Ultrasound guidance: yes  Needle advanced into vessel with real time Ultrasound guidance.  Guidewire confirmed in vessel.  Sterile sheath used.            Name js  4/19/2024    "

## 2024-04-19 NOTE — PROGRESS NOTES
Progress Note  Maternal Fetal Medicine          Subjective:         Lily Mckeon is a 23 y.o.  female with twin IUP at 21w3d (with known IUFD of Twin A since 16w EGA) who presented 24 for leaking/bleeding (oligohydramnios of twin B since early second trimester).     She has some red/brown tinged fluid but no cramping. Overall feeling fine. She denies other new issues. She elects to have PICC placed today.     Objective:         Temp:  [97.9 °F (36.6 °C)-98.8 °F (37.1 °C)] 98.8 °F (37.1 °C)  Pulse:  [] 107  Resp:  [16-20] 18  BP: (111-133)/(60-81) 122/81    Gen: NAD, A&Ox3  Pulm: Unlabored breathing, LCTAB  Card: RRR  Abd: FHT present, soft, nondistended, nontender to palpation, gravid uterus palpable c/w gestational age  Extremities: Palpable peripheral pulses, no pedal edema, 2+ DTRs x 4    FHTs baby B +   [place]: none  US: IUFD of Twin A, footling breech presentation (living twin), posterior placenta, MVP 2.0cm-Twin B. EFW 410g      Lab Review  Blood Type: O POS    Recent Results (from the past 24 hour(s))   Type & Screen    Collection Time: 24  6:43 AM   Result Value Ref Range    Group & Rh O POS     Indirect Martir GEL NEG     Specimen Outdate 2024 23:59    CBC with Differential    Collection Time: 24  6:43 AM   Result Value Ref Range    WBC 9.80 4.50 - 11.50 x10(3)/mcL    RBC 3.77 (L) 4.20 - 5.40 x10(6)/mcL    Hgb 11.2 (L) 12.0 - 16.0 g/dL    Hct 32.9 (L) 37.0 - 47.0 %    MCV 87.3 80.0 - 94.0 fL    MCH 29.7 27.0 - 31.0 pg    MCHC 34.0 33.0 - 36.0 g/dL    RDW 12.8 11.5 - 17.0 %    Platelet 308 130 - 400 x10(3)/mcL    MPV 8.6 7.4 - 10.4 fL    Neut % 67.4 %    Lymph % 24.3 %    Mono % 5.8 %    Eos % 1.9 %    Basophil % 0.3 %    Lymph # 2.38 0.6 - 4.6 x10(3)/mcL    Neut # 6.60 2.1 - 9.2 x10(3)/mcL    Mono # 0.57 0.1 - 1.3 x10(3)/mcL    Eos # 0.19 0 - 0.9 x10(3)/mcL    Baso # 0.03 <=0.2 x10(3)/mcL    IG# 0.03 0 - 0.04 x10(3)/mcL    IG% 0.3 %    NRBC% 0.0 %          Assessment:       23 y.o.  at 21w3d weeks gestation admitted for twin gestation with PPROM of twin B, vaginal bleeding, IUFD of Twin A diagnosed at 16w, cHTN, elevated msAFP    Plan:     1. Twins - previable PPROM Twin B  Patient's evaluation and exam is consistent with previable PPROM (rupture of membranes prior to 22 weeks 6 days gestation). Patient was counseled regarding the extremely poor prognosis of ruptured membranes at this early gestational age. We reviewed the maternal risks of previable premature rupture of membranes which include but are not limited to chorioamnionitis, endometritis, sepsis, potential need for hysterectomy and loss of fertility, VTE, and maternal death. We reviewed the fetal risks which include stillbirth, cord prolapse, infection, pulmonary hypoplasia, risk for musculoskeletal and facial anomalies due to severe oligohydramnios, and  death. We reviewed routine pulmonary maturation and the function of amniotic fluid in the normal growth of fetal lungs. We reviewed the unlikely chance of membranes resealing from spontaneous rupture. I counseled the patient regarding management options which prior to 22 weeks (ie gestational age < 21 weeks 6 days) include conservative management, induction of labor, or dilation and evacuation. At a gestational age of 22 weeks 0 days and greater, conservative management is recommended at [facility].  Known IUFD of Twin A - diagnosed at 16w (fetus was measuring 13w6d at that time)    Recommendations:  - Latency antibiotics initiated.  - Confirm FHTs (Twin B) with doppler qshift. Continuous toco monitoring due to patient reporting intermittent cramping.  - Monitor for s/s infection, abruption. Currently afebrile. No evidence of leukocytosis- weekly CBC planned  - EFW q3 weeks (completed 24). No evidence of placenta previa on most recent ultrasound.  - Once weekly AFV/presentation, if admitted.   - Prior to PPROM and this hospital  admission, we had extensively discussed the high risk for fetal morbidity/mortality given Twin B's severe oligohydramnios since 16w and significantly elevated AFP. These risks are compounded by PPROM and we discussed this even further today utilizing translating services with her significant other present in the room. They were without questions and verbalized understanding.   - VTE prophylaxis (SCDs ordered)  - 4/18- I addressed the previable status and poor prognosis with the patient and she does not feel comfortable being at home given the IUFD of baby A and guarded prognosis for baby B. She will remain inpatient until delivery.  - Steroids and magnesium at 23 weeks. Considering intervention at 23 weeks.  - 4/19- PICC discussed and ordered.     2. cHTN  - Continue home med of Nifedipine 30mg XL daily.  - Monitor Bps as ordered.  - If she has a mild headache, may administer Tylenol PRN.    3. BMI 32.59  -Complicates all aspects of care  - Planning early glucola tomorrow. She does not need to be fasting.    Thank you for allowing us to participate in the care of your patient. If you have any questions/concerns, please do not hesitate to contact us.     Michelle Calvin MD  Maternal Fetal Medicine

## 2024-04-19 NOTE — PROGRESS NOTES
Antepartum Progress Note        Subjective:      Lily Mckeon is a 23 y.o.  @ 21w3d who is admitted for management of PPROM  in the setting of twin gestation with fetal demise of Twin A.    ROSHAN. Continues to have vaginal spotting on toilet paper in the morning that does not reoccur throughout the day. Denies dysuria . She denies abdominal pain, contractions , nausea and vomiting or decreased afetal movement     Objective:      Temp:  [97.9 °F (36.6 °C)-98.8 °F (37.1 °C)] 98.8 °F (37.1 °C)  Pulse:  [] 107  Resp:  [16-20] 18  BP: (111-133)/(60-81) 122/81  Body mass index is 32.59 kg/m².     General: no acute distress, resting in bed   Resp: No increased WOB  CV: regular rate  Abd: soft, nt, nd, gravid  Ext: No erythema or calf tenderness  SSE (per Dr. Ledesma) : normal external vulvar without blood labia, normal vaginal mucosa without evidence of abnormal discharge, lesion or blood, cervical os was closed without discharge or blood coming from the internal os. Scant dark brown blood in the posterior vaginal vault.       FHT: 150 bpm      Assessment/Plan     1. IUP at  @ 21w1d  admitted for management of PPROM .    Group & Rh   Date Value Ref Range Status   2024 O POS  Final     # PPROM   - Positive ROM +   - latency antibiotics :   Azithromycin 1 g on day 1 and day 5   S/p ampicillin IV 2 grams q 6 hours for 48 hours    D2  amoxicillin 500 mg TID ( day 3-10 )   - GC/ CT : negative   - GBS: negative   - wet prep: negative   - in house until delivery at 34 weeks    - BMZ to be given at 23 weeks    - will continue to monitor vaginal bleeding. If patient complaints of abdominal pain, place on continuous tachometry      # Chronic HTN   - continue daily Procardia 30 XL   - ASA 81 daily   - will continue to monitor blood pressures      # Mono-Di twins with retained fetal demise of Twin A   - FHT q shift   - PNV daily            Discussed with Dr. Ralph Nelson MD   [facility]  [place], PGY2

## 2024-04-20 LAB — BACTERIA SPEC CULT: NORMAL

## 2024-04-20 PROCEDURE — 25000003 PHARM REV CODE 250

## 2024-04-20 PROCEDURE — 25000003 PHARM REV CODE 250: Performed by: NURSE PRACTITIONER

## 2024-04-20 PROCEDURE — 11000001 HC ACUTE MED/SURG PRIVATE ROOM

## 2024-04-20 PROCEDURE — 99233 SBSQ HOSP IP/OBS HIGH 50: CPT | Mod: ,,, | Performed by: OBSTETRICS & GYNECOLOGY

## 2024-04-20 PROCEDURE — A4216 STERILE WATER/SALINE, 10 ML: HCPCS | Performed by: NURSE PRACTITIONER

## 2024-04-20 RX ORDER — DOCUSATE SODIUM 100 MG/1
100 CAPSULE, LIQUID FILLED ORAL DAILY
Status: DISCONTINUED | OUTPATIENT
Start: 2024-04-20 | End: 2024-04-20

## 2024-04-20 RX ORDER — ONDANSETRON 4 MG/1
4 TABLET, ORALLY DISINTEGRATING ORAL EVERY 8 HOURS PRN
Status: DISCONTINUED | OUTPATIENT
Start: 2024-04-20 | End: 2024-05-11

## 2024-04-20 RX ORDER — FAMOTIDINE 20 MG/1
20 TABLET, FILM COATED ORAL 2 TIMES DAILY
Status: COMPLETED | OUTPATIENT
Start: 2024-04-20 | End: 2024-04-26

## 2024-04-20 RX ORDER — DOCUSATE SODIUM 100 MG/1
200 CAPSULE, LIQUID FILLED ORAL DAILY
Status: DISCONTINUED | OUTPATIENT
Start: 2024-04-21 | End: 2024-05-11

## 2024-04-20 RX ADMIN — FAMOTIDINE 20 MG: 20 TABLET, FILM COATED ORAL at 08:04

## 2024-04-20 RX ADMIN — SODIUM CHLORIDE, PRESERVATIVE FREE 10 ML: 5 INJECTION INTRAVENOUS at 12:04

## 2024-04-20 RX ADMIN — PSYLLIUM HUSK 1 PACKET: 3.4 POWDER ORAL at 10:04

## 2024-04-20 RX ADMIN — PRENATAL VITAMINS-IRON FUMARATE 27 MG IRON-FOLIC ACID 0.8 MG TABLET 1 TABLET: at 08:04

## 2024-04-20 RX ADMIN — AMOXICILLIN 500 MG: 500 CAPSULE ORAL at 09:04

## 2024-04-20 RX ADMIN — ONDANSETRON 4 MG: 4 TABLET, ORALLY DISINTEGRATING ORAL at 08:04

## 2024-04-20 RX ADMIN — AMOXICILLIN 500 MG: 500 CAPSULE ORAL at 01:04

## 2024-04-20 RX ADMIN — SODIUM CHLORIDE, PRESERVATIVE FREE 10 ML: 5 INJECTION INTRAVENOUS at 06:04

## 2024-04-20 RX ADMIN — FAMOTIDINE 20 MG: 20 TABLET, FILM COATED ORAL at 09:04

## 2024-04-20 RX ADMIN — NIFEDIPINE 30 MG: 30 TABLET, FILM COATED, EXTENDED RELEASE ORAL at 08:04

## 2024-04-20 RX ADMIN — SODIUM CHLORIDE, PRESERVATIVE FREE 10 ML: 5 INJECTION INTRAVENOUS at 11:04

## 2024-04-20 RX ADMIN — AMOXICILLIN 500 MG: 500 CAPSULE ORAL at 06:04

## 2024-04-20 RX ADMIN — DOCUSATE SODIUM 100 MG: 100 CAPSULE, LIQUID FILLED ORAL at 08:04

## 2024-04-20 NOTE — PROGRESS NOTES
Progress Note  Maternal Fetal Medicine          Subjective:         Lily Mckeon is a 23 y.o.  female with twin IUP at 21w4d (with known IUFD of Twin A since 16w EGA) who presented 24 for leaking/bleeding (oligohydramnios of twin B since early second trimester).     No more bleeding. No cramping. She has her PICC line now. Glucola planned for tomorrow. She has had constipation and colace helped, she has had small BM.     Objective:         Temp:  [97.5 °F (36.4 °C)-98.5 °F (36.9 °C)] 98.5 °F (36.9 °C)  Pulse:  [79-96] 81  Resp:  [16-18] 16  SpO2:  [99 %] 99 %  BP: (113-122)/(70-82) 117/78    Gen: NAD, A&Ox3  Pulm: Unlabored breathing, LCTAB  Card: RRR  Abd: FHT present, soft, nondistended, nontender to palpation, gravid uterus palpable c/w gestational age  Extremities: Palpable peripheral pulses, no pedal edema, 2+ DTRs x 4    FHTs baby B +   [place]: none  US: IUFD of Twin A, footling breech presentation (living twin), posterior placenta, MVP 2.0cm-Twin B. EFW 410g      Lab Review  Blood Type: O POS    No results found for this or any previous visit (from the past 24 hour(s)).        Assessment:       23 y.o.  at 21w4d weeks gestation admitted for twin gestation with PPROM of twin B, vaginal bleeding, IUFD of Twin A diagnosed at 16w, cHTN, elevated msAFP    Plan:     1. Twins - previable PPROM Twin B  Patient's evaluation and exam is consistent with previable PPROM (rupture of membranes prior to 22 weeks 6 days gestation). Patient was counseled regarding the extremely poor prognosis of ruptured membranes at this early gestational age. We reviewed the maternal risks of previable premature rupture of membranes which include but are not limited to chorioamnionitis, endometritis, sepsis, potential need for hysterectomy and loss of fertility, VTE, and maternal death. We reviewed the fetal risks which include stillbirth, cord prolapse, infection, pulmonary hypoplasia, risk for musculoskeletal and  facial anomalies due to severe oligohydramnios, and  death. We reviewed routine pulmonary maturation and the function of amniotic fluid in the normal growth of fetal lungs. We reviewed the unlikely chance of membranes resealing from spontaneous rupture. I counseled the patient regarding management options which prior to 22 weeks (ie gestational age < 21 weeks 6 days) include conservative management, induction of labor, or dilation and evacuation. At a gestational age of 22 weeks 0 days and greater, conservative management is recommended at [facility].  Known IUFD of Twin A - diagnosed at 16w (fetus was measuring 13w6d at that time)    Recommendations:  - Latency antibiotics initiated.  - Confirm FHTs (Twin B) with doppler qshift. Continuous toco monitoring due to patient reporting intermittent cramping.  - Monitor for s/s infection, abruption. Currently afebrile. No evidence of leukocytosis- weekly CBC planned  - EFW q3 weeks (completed 24). No evidence of placenta previa on most recent ultrasound.  - Once weekly AFV/presentation, if admitted.   - Prior to PPROM and this hospital admission, we had extensively discussed the high risk for fetal morbidity/mortality given Twin B's severe oligohydramnios since 16w and significantly elevated AFP. These risks are compounded by PPROM and we discussed this even further today utilizing translating services with her significant other present in the room. They were without questions and verbalized understanding.   - VTE prophylaxis (SCDs ordered)  - - I addressed the previable status and poor prognosis with the patient and she does not feel comfortable being at home given the IUFD of baby A and guarded prognosis for baby B. She will remain inpatient until delivery.  - Steroids and magnesium at 23 weeks. Considering intervention at 23 weeks.  - - PICC done    2. cHTN  - Continue home med of Nifedipine 30mg XL daily.  - Monitor Bps as ordered.  - If she has a  mild headache, may administer Tylenol PRN.    3. BMI 32.59  -Complicates all aspects of care  - Planning early glucola tomorrow. She does not need to be fasting.    4. Constipation  - Colace 200mg daily    Thank you for allowing us to participate in the care of your patient. If you have any questions/concerns, please do not hesitate to contact us.     Michelle Calvin MD  Maternal Fetal Medicine

## 2024-04-20 NOTE — PROGRESS NOTES
Antepartum Progress Note        Subjective:      Lily Mckeon is a 23 y.o.  @ 21w4d who is admitted for management of PPROM  in the setting of twin gestation with fetal demise of Twin ALissette ISLAS. Denies fever, chills, CP/SOB, abdominal pain/cramping/UC, vaginal spotting/discharge. She reports nausea daily with occasional vomiting. States started with pregnancy and had been using Ondanestron prn at home. Per nursing staff, pt has been using Zofran ODT almost daily for nausea. She reports last bowel movement on Tuesday. PICC line in place.   Objective:      Temp:  [97.5 °F (36.4 °C)-98.5 °F (36.9 °C)] 98.5 °F (36.9 °C)  Pulse:  [] 81  Resp:  [16-18] 16  SpO2:  [99 %] 99 %  BP: (113-122)/(70-82) 117/78  Body mass index is 32.59 kg/m².     General: no acute distress, resting in bed   Resp: clear to auscultation bilaterally  CV: regular rate  Abd: soft, nt, nd, gravid  Ext: No erythema or calf tenderness      FHT: 151 bpm      Assessment/Plan     1. IUP at  @ 21w4d  twin gestation admitted for management of PPROM (twin B) with fetal demise of twin A.    Group & Rh   Date Value Ref Range Status   2024 O POS  Final     # PPROM   - Positive ROM +   - latency antibiotics :   Azithromycin 1 g on day 1 and day 5   S/p ampicillin IV 2 grams q 6 hours for 48 hours    D3  amoxicillin 500 mg TID ( day 3-10 )   Add Pepcid 20mg bid while on antibiotics  - GC/ CT : negative   - GBS: negative   - wet prep: negative   - in house until delivery at 34 weeks    - BMZ to be given at 23 weeks    -  If patient complains of abdominal pain, place on continuous toco     # Chronic HTN   - continue daily Procardia 30 XL   - ASA 81 daily   - will continue to monitor blood pressures      # Mono-Di twins with retained fetal demise of Twin A   - FHT q shift   - PNV daily      # Bowel health  - Add Colace daily with fiber supplement prn    # Nausea  -Continue Zofran 4mg prn    # BMI  -Early glucola planned for  tomorrow.         Discussed with Dr. Nathen Naqvi [place], NP

## 2024-04-21 LAB
APPEARANCE UR: CLEAR
BACTERIA #/AREA URNS AUTO: ABNORMAL /HPF
BILIRUB UR QL STRIP.AUTO: NEGATIVE
COLOR UR AUTO: COLORLESS
GLUCOSE 1H P 100 G GLC PO SERPL-MCNC: 190 MG/DL (ref 100–180)
GLUCOSE UR QL STRIP.AUTO: NORMAL
KETONES UR QL STRIP.AUTO: NEGATIVE
LEUKOCYTE ESTERASE UR QL STRIP.AUTO: NEGATIVE
NITRITE UR QL STRIP.AUTO: NEGATIVE
PH UR STRIP.AUTO: 7 [PH]
PROT UR QL STRIP.AUTO: NEGATIVE
RBC #/AREA URNS AUTO: ABNORMAL /HPF
RBC UR QL AUTO: NEGATIVE
SP GR UR STRIP.AUTO: 1 (ref 1–1.03)
SQUAMOUS #/AREA URNS LPF: ABNORMAL /HPF
UROBILINOGEN UR STRIP-ACNC: NORMAL
WBC #/AREA URNS AUTO: ABNORMAL /HPF

## 2024-04-21 PROCEDURE — 63700000 PHARM REV CODE 250 ALT 637 W/O HCPCS

## 2024-04-21 PROCEDURE — 82950 GLUCOSE TEST: CPT | Performed by: NURSE PRACTITIONER

## 2024-04-21 PROCEDURE — 25000003 PHARM REV CODE 250: Performed by: NURSE PRACTITIONER

## 2024-04-21 PROCEDURE — 25000003 PHARM REV CODE 250: Performed by: OBSTETRICS & GYNECOLOGY

## 2024-04-21 PROCEDURE — 81001 URINALYSIS AUTO W/SCOPE: CPT | Performed by: NURSE PRACTITIONER

## 2024-04-21 PROCEDURE — 11000001 HC ACUTE MED/SURG PRIVATE ROOM

## 2024-04-21 PROCEDURE — 25000003 PHARM REV CODE 250

## 2024-04-21 PROCEDURE — 99232 SBSQ HOSP IP/OBS MODERATE 35: CPT | Mod: ,,, | Performed by: OBSTETRICS & GYNECOLOGY

## 2024-04-21 PROCEDURE — A4216 STERILE WATER/SALINE, 10 ML: HCPCS | Performed by: NURSE PRACTITIONER

## 2024-04-21 PROCEDURE — 87077 CULTURE AEROBIC IDENTIFY: CPT | Performed by: NURSE PRACTITIONER

## 2024-04-21 PROCEDURE — 63600175 PHARM REV CODE 636 W HCPCS: Mod: JZ,JG | Performed by: OBSTETRICS & GYNECOLOGY

## 2024-04-21 PROCEDURE — 87086 URINE CULTURE/COLONY COUNT: CPT | Performed by: NURSE PRACTITIONER

## 2024-04-21 RX ADMIN — SODIUM CHLORIDE, PRESERVATIVE FREE 10 ML: 5 INJECTION INTRAVENOUS at 12:04

## 2024-04-21 RX ADMIN — DOCUSATE SODIUM 200 MG: 100 CAPSULE, LIQUID FILLED ORAL at 10:04

## 2024-04-21 RX ADMIN — NIFEDIPINE 30 MG: 30 TABLET, FILM COATED, EXTENDED RELEASE ORAL at 10:04

## 2024-04-21 RX ADMIN — FAMOTIDINE 20 MG: 20 TABLET, FILM COATED ORAL at 10:04

## 2024-04-21 RX ADMIN — AMOXICILLIN 500 MG: 500 CAPSULE ORAL at 09:04

## 2024-04-21 RX ADMIN — ALTEPLASE 2 MG: 2.2 INJECTION, POWDER, LYOPHILIZED, FOR SOLUTION INTRAVENOUS at 06:04

## 2024-04-21 RX ADMIN — PRENATAL VITAMINS-IRON FUMARATE 27 MG IRON-FOLIC ACID 0.8 MG TABLET 1 TABLET: at 10:04

## 2024-04-21 RX ADMIN — PSYLLIUM HUSK 1 PACKET: 3.4 POWDER ORAL at 10:04

## 2024-04-21 RX ADMIN — ACETAMINOPHEN 650 MG: 325 TABLET, FILM COATED ORAL at 01:04

## 2024-04-21 RX ADMIN — SODIUM CHLORIDE, PRESERVATIVE FREE 20 ML: 5 INJECTION INTRAVENOUS at 07:04

## 2024-04-21 RX ADMIN — AMOXICILLIN 500 MG: 500 CAPSULE ORAL at 05:04

## 2024-04-21 RX ADMIN — ONDANSETRON 4 MG: 4 TABLET, ORALLY DISINTEGRATING ORAL at 03:04

## 2024-04-21 RX ADMIN — SODIUM CHLORIDE, PRESERVATIVE FREE 20 ML: 5 INJECTION INTRAVENOUS at 01:04

## 2024-04-21 RX ADMIN — FAMOTIDINE 20 MG: 20 TABLET, FILM COATED ORAL at 09:04

## 2024-04-21 RX ADMIN — AZITHROMYCIN DIHYDRATE 1000 MG: 250 TABLET ORAL at 12:04

## 2024-04-21 RX ADMIN — SODIUM CHLORIDE, PRESERVATIVE FREE 10 ML: 5 INJECTION INTRAVENOUS at 05:04

## 2024-04-21 RX ADMIN — AMOXICILLIN 500 MG: 500 CAPSULE ORAL at 01:04

## 2024-04-21 NOTE — PROGRESS NOTES
INTERMITTENT PROGRESS NOTE  ANTEPARTUM    Admit Date: 2024   LOS: 5 days     Reason for Admission:  Lily Mckeon is a 23 y.o.  @ 21w5d who is admitted for management of PPROM  in the setting of twin gestation with fetal demise of Twin A.   SUBJECTIVE:   Patient with concerns for urinary frequency with back pain this afternoon. States frequency in setting of her normal amount of oral fluids. She denies fever, chills, abdominal pain, cramping, contractions, dysuria, vaginal bleeding.    Scheduled Meds:  Current Facility-Administered Medications   Medication Dose Route Frequency    amoxicillin  500 mg Oral Q8H    docusate sodium  200 mg Oral Daily    famotidine  20 mg Oral BID    NIFEdipine  30 mg Oral Daily    prenatal vitamin  1 tablet Oral Daily    sodium chloride 0.9%  10 mL Intravenous Q6H     Continuous Infusions:  Current Facility-Administered Medications   Medication Dose Route Frequency Last Rate Last Admin     PRN Meds:  Current Facility-Administered Medications:     acetaminophen, 650 mg, Oral, Q6H PRN    diphenhydrAMINE, 25 mg, Oral, Q4H PRN    diphenhydrAMINE, 25 mg, Intravenous, Q4H PRN    ondansetron, 4 mg, Oral, Q8H PRN    psyllium husk (with sugar), 1 packet, Oral, Daily PRN    senna-docusate 8.6-50 mg, 1 tablet, Oral, Nightly PRN    simethicone, 1 tablet, Oral, Q6H PRN    sodium chloride 0.9%, 10 mL, Intravenous, PRN    Flushing PICC/Midline Protocol, , , Until Discontinued **AND** sodium chloride 0.9%, 10 mL, Intravenous, Q6H **AND** sodium chloride 0.9%, 10 mL, Intravenous, PRN    Review of patient's allergies indicates:   Allergen Reactions    Pineapple Hives and Shortness Of Breath       OBJECTIVE:     Vital Signs (Most Recent)  Temp: 98.9 °F (37.2 °C) (24 1530)  Pulse: 96 (24 1530)  Resp: 17 (24 0729)  BP: 117/67 (24 1530)  SpO2: 99 % (24 0729)    Temperature Range Min/Max (Last 24H):  Temp:  [98 °F (36.7 °C)-98.9 °F (37.2 °C)]     Vital Signs  Range (Last 24H):  Temp:  [98 °F (36.7 °C)-98.9 °F (37.2 °C)]   Pulse:  []   Resp:  [16-18]   BP: (111-131)/(67-79)   SpO2:  [99 %-100 %]       Physical Exam:  General: well developed, well nourished, no distress  Abd: soft, nontender, gravid. No suprapubic tenderness     FHT: 150, twin B                  TOCO: none     Laboratory:  I have reviewed all pertinent lab results within the past 24 hours.  Recent Labs   Lab 24  1455   PROTEINUA Negative   BACTERIA None Seen   LEUKOCYTESUR Negative   UROBILINOGEN Normal       ASSESSMENT/PLAN:    at 21.5 weeks twin gestation admitted for management of PPROM (twin B) with fetal demise of twin A.     Urinary frequency  -UA negative, back pain improved with Tylenol. Will continue to monitor  2.    Early glucola - 190  -Plan 3 hr GTT tomorrow. Pt aware NPO after midnight          Donya Biswas NP

## 2024-04-21 NOTE — NURSING
MD notified of inability to flush proximal lumen of PICC line at 0143. PICC supervisor called and message left. RN attempted to flush both proximal and distal lumens again at 0559, RN unable to flush. Order for alteplase was placed per Dr. Sena's order.

## 2024-04-21 NOTE — PROGRESS NOTES
Progress Note  Maternal Fetal Medicine          Subjective:         Lily Mckeon is a 23 y.o.  female with twin IUP at 21w5d (with known IUFD of Twin A since 16w EGA) who presented 24 for leaking/bleeding (oligohydramnios of twin B since early second trimester).     No more bleeding. No cramping. She is waiting for BG from glucose test right now.     Objective:         Temp:  [98 °F (36.7 °C)-98.6 °F (37 °C)] 98.5 °F (36.9 °C)  Pulse:  [78-99] 93  Resp:  [16-18] 17  SpO2:  [99 %-100 %] 99 %  BP: (111-131)/(70-79) 120/73    Gen: NAD, A&Ox3  Pulm: Unlabored breathing, LCTAB  Card: RRR  Abd: FHT present, soft, nondistended, nontender to palpation, gravid uterus palpable c/w gestational age  Extremities: Palpable peripheral pulses, no pedal edema, 2+ DTRs x 4    FHTs baby B +   [place]: none  US: IUFD of Twin A, footling breech presentation (living twin), posterior placenta, MVP 2.0cm-Twin B. EFW 410g      Lab Review  Blood Type: O POS    No results found for this or any previous visit (from the past 24 hour(s)).        Assessment:       23 y.o.  at 21w5d weeks gestation admitted for twin gestation with PPROM of twin B, vaginal bleeding, IUFD of Twin A diagnosed at 16w, cHTN, elevated msAFP    Plan:     1. Twins - previable PPROM Twin B  Patient's evaluation and exam is consistent with previable PPROM (rupture of membranes prior to 22 weeks 6 days gestation). Patient was counseled regarding the extremely poor prognosis of ruptured membranes at this early gestational age. We reviewed the maternal risks of previable premature rupture of membranes which include but are not limited to chorioamnionitis, endometritis, sepsis, potential need for hysterectomy and loss of fertility, VTE, and maternal death. We reviewed the fetal risks which include stillbirth, cord prolapse, infection, pulmonary hypoplasia, risk for musculoskeletal and facial anomalies due to severe oligohydramnios, and  death. We  reviewed routine pulmonary maturation and the function of amniotic fluid in the normal growth of fetal lungs. We reviewed the unlikely chance of membranes resealing from spontaneous rupture. I counseled the patient regarding management options which prior to 22 weeks (ie gestational age < 21 weeks 6 days) include conservative management, induction of labor, or dilation and evacuation. At a gestational age of 22 weeks 0 days and greater, conservative management is recommended at [facility].  Known IUFD of Twin A - diagnosed at 16w (fetus was measuring 13w6d at that time)    Recommendations:  - Latency antibiotics initiated.  - Confirm FHTs (Twin B) with doppler qshift. Continuous toco monitoring due to patient reporting intermittent cramping.  - Monitor for s/s infection, abruption. Currently afebrile. No evidence of leukocytosis- weekly CBC planned  - EFW q3 weeks (completed 4/16/24). No evidence of placenta previa on most recent ultrasound.  - Once weekly AFV/presentation, if admitted.   - Prior to PPROM and this hospital admission, we had extensively discussed the high risk for fetal morbidity/mortality given Twin B's severe oligohydramnios since 16w and significantly elevated AFP. These risks are compounded by PPROM and we discussed this even further today utilizing translating services with her significant other present in the room. They were without questions and verbalized understanding.   - VTE prophylaxis (SCDs ordered)  - 4/18- I addressed the previable status and poor prognosis with the patient and she does not feel comfortable being at home given the IUFD of baby A and guarded prognosis for baby B. She will remain inpatient until delivery.  - Steroids and magnesium at 23 weeks. Considering intervention at 23 weeks.  - 4/19- PICC done    2. cHTN  - Continue home med of Nifedipine 30mg XL daily.  - Monitor Bps as ordered.  - If she has a mild headache, may administer Tylenol PRN.    3. BMI 32.59  -Complicates  all aspects of care  - Glucola underway    4. Constipation  - Colace 200mg daily    Thank you for allowing us to participate in the care of your patient. If you have any questions/concerns, please do not hesitate to contact us.     Michelle Calvin MD  Maternal Fetal Medicine

## 2024-04-21 NOTE — PROGRESS NOTES
Antepartum Progress Note        Subjective:      Lily Mckeon is a 23 y.o.  @ 21w5d who is admitted for management of PPROM  in the setting of twin gestation with fetal demise of Twin ALissette ISLAS. Denies fever, chills, CP/SOB, abdominal pain/cramping/UC, vaginal spotting/discharge. She reports nausea daily with occasional vomiting. Reports vomited this morning around 0300 receiving Zofran. States nausea resolved currently. Reports having two small bowel movements on yesterday. PICC clotted off this morning but currently now working. Planned glucose tolerance test this morning.   Objective:      Temp:  [98 °F (36.7 °C)-98.6 °F (37 °C)] 98.4 °F (36.9 °C)  Pulse:  [78-99] 99  Resp:  [16-18] 18  SpO2:  [99 %-100 %] 100 %  BP: (111-131)/(70-79) 131/79  Body mass index is 32.59 kg/m².     General: no acute distress, resting in bed   Resp: clear to auscultation bilaterally  CV: regular rate  Abd: soft, nt, nd, gravid  Ext: No erythema or calf tenderness      FHT: 142 bpm      Assessment/Plan     1. IUP at  @ 21w5d  twin gestation admitted for management of PPROM (twin B) with fetal demise of twin A.    Group & Rh   Date Value Ref Range Status   2024 O POS  Final     # PPROM   - Positive ROM +   - latency antibiotics :   Azithromycin 1 g on day 1 and day 5   S/p ampicillin IV 2 grams q 6 hours for 48 hours    D4  amoxicillin 500 mg TID ( day 3-10 )   Add Pepcid 20mg bid while on antibiotics  - GC/ CT : negative   - GBS: negative   - wet prep: negative   - in house until delivery at 34 weeks    - BMZ to be given at 23 weeks    -  If patient complains of abdominal pain, place on continuous toco     # Chronic HTN   - continue daily Procardia 30 XL   - ASA 81 daily   - will continue to monitor blood pressures      # Mono-Di twins with retained fetal demise of Twin A   - FHT q shift   - PNV daily      # Bowel health  - Add Colace daily with fiber supplement prn    # Nausea  -Continue Zofran 4mg  prn    # BMI  -Glucose testing this morning       Donya [place], NP

## 2024-04-21 NOTE — PLAN OF CARE
Problem: Adult Inpatient Plan of Care  Goal: Plan of Care Review  Outcome: Ongoing, Progressing  Flowsheets (Taken 4/21/2024 [id number])  Plan of Care Reviewed With:   patient   spouse  Goal: Optimal Comfort and Wellbeing  Outcome: Ongoing, Progressing  Goal: Readiness for Transition of Care  Outcome: Ongoing, Progressing     Problem: Infection  Goal: Absence of Infection Signs and Symptoms  Outcome: Ongoing, Progressing

## 2024-04-22 LAB
BACTERIA UR CULT: ABNORMAL
BASOPHILS # BLD AUTO: 0.05 X10(3)/MCL
BASOPHILS NFR BLD AUTO: 0.5 %
EOSINOPHIL # BLD AUTO: 0.15 X10(3)/MCL (ref 0–0.9)
EOSINOPHIL NFR BLD AUTO: 1.6 %
ERYTHROCYTE [DISTWIDTH] IN BLOOD BY AUTOMATED COUNT: 12.6 % (ref 11.5–17)
GLUCOSE 1H P 100 G GLC PO SERPL-MCNC: 180 MG/DL (ref 100–180)
GLUCOSE 2H P 100 G GLC PO SERPL-MCNC: 142 MG/DL (ref 70–140)
GLUCOSE 3H P 100 G GLC PO SERPL-MCNC: 85 MG/DL (ref 70–115)
GLUCOSE P FAST SERPL-MCNC: 90 MG/DL (ref 70–100)
GROUP & RH: NORMAL
HCT VFR BLD AUTO: 32 % (ref 37–47)
HGB BLD-MCNC: 10.8 G/DL (ref 12–16)
IMM GRANULOCYTES # BLD AUTO: 0.04 X10(3)/MCL (ref 0–0.04)
IMM GRANULOCYTES NFR BLD AUTO: 0.4 %
INDIRECT COOMBS: NORMAL
LYMPHOCYTES # BLD AUTO: 1.87 X10(3)/MCL (ref 0.6–4.6)
LYMPHOCYTES NFR BLD AUTO: 20.4 %
MCH RBC QN AUTO: 29.7 PG (ref 27–31)
MCHC RBC AUTO-ENTMCNC: 33.8 G/DL (ref 33–36)
MCV RBC AUTO: 87.9 FL (ref 80–94)
MONOCYTES # BLD AUTO: 0.5 X10(3)/MCL (ref 0.1–1.3)
MONOCYTES NFR BLD AUTO: 5.5 %
NEUTROPHILS # BLD AUTO: 6.56 X10(3)/MCL (ref 2.1–9.2)
NEUTROPHILS NFR BLD AUTO: 71.6 %
NRBC BLD AUTO-RTO: 0 %
PATH REV: NORMAL
PLATELET # BLD AUTO: 295 X10(3)/MCL (ref 130–400)
PMV BLD AUTO: 8.8 FL (ref 7.4–10.4)
RBC # BLD AUTO: 3.64 X10(6)/MCL (ref 4.2–5.4)
SPECIMEN OUTDATE: NORMAL
WBC # SPEC AUTO: 9.17 X10(3)/MCL (ref 4.5–11.5)

## 2024-04-22 PROCEDURE — 82950 GLUCOSE TEST: CPT | Performed by: NURSE PRACTITIONER

## 2024-04-22 PROCEDURE — 25000003 PHARM REV CODE 250: Performed by: OBSTETRICS & GYNECOLOGY

## 2024-04-22 PROCEDURE — 11000001 HC ACUTE MED/SURG PRIVATE ROOM

## 2024-04-22 PROCEDURE — 82952 GTT-ADDED SAMPLES: CPT | Performed by: NURSE PRACTITIONER

## 2024-04-22 PROCEDURE — 25000003 PHARM REV CODE 250

## 2024-04-22 PROCEDURE — 82951 GLUCOSE TOLERANCE TEST (GTT): CPT | Performed by: NURSE PRACTITIONER

## 2024-04-22 PROCEDURE — 82947 ASSAY GLUCOSE BLOOD QUANT: CPT | Performed by: NURSE PRACTITIONER

## 2024-04-22 PROCEDURE — 86850 RBC ANTIBODY SCREEN: CPT

## 2024-04-22 PROCEDURE — A4216 STERILE WATER/SALINE, 10 ML: HCPCS | Performed by: NURSE PRACTITIONER

## 2024-04-22 PROCEDURE — 25000003 PHARM REV CODE 250: Performed by: NURSE PRACTITIONER

## 2024-04-22 PROCEDURE — 85025 COMPLETE CBC W/AUTO DIFF WBC: CPT

## 2024-04-22 PROCEDURE — 99233 SBSQ HOSP IP/OBS HIGH 50: CPT | Mod: ,,, | Performed by: OBSTETRICS & GYNECOLOGY

## 2024-04-22 RX ORDER — LANOLIN ALCOHOL/MO/W.PET/CERES
50 CREAM (GRAM) TOPICAL DAILY
Status: DISCONTINUED | OUTPATIENT
Start: 2024-04-22 | End: 2024-05-11

## 2024-04-22 RX ADMIN — SODIUM CHLORIDE, PRESERVATIVE FREE 20 ML: 5 INJECTION INTRAVENOUS at 12:04

## 2024-04-22 RX ADMIN — FAMOTIDINE 20 MG: 20 TABLET, FILM COATED ORAL at 08:04

## 2024-04-22 RX ADMIN — PRENATAL VITAMINS-IRON FUMARATE 27 MG IRON-FOLIC ACID 0.8 MG TABLET 1 TABLET: at 09:04

## 2024-04-22 RX ADMIN — SODIUM CHLORIDE, PRESERVATIVE FREE 20 ML: 5 INJECTION INTRAVENOUS at 08:04

## 2024-04-22 RX ADMIN — ONDANSETRON 4 MG: 4 TABLET, ORALLY DISINTEGRATING ORAL at 08:04

## 2024-04-22 RX ADMIN — AMOXICILLIN 500 MG: 500 CAPSULE ORAL at 05:04

## 2024-04-22 RX ADMIN — AMOXICILLIN 500 MG: 500 CAPSULE ORAL at 02:04

## 2024-04-22 RX ADMIN — FAMOTIDINE 20 MG: 20 TABLET, FILM COATED ORAL at 09:04

## 2024-04-22 RX ADMIN — DOCUSATE SODIUM 200 MG: 100 CAPSULE, LIQUID FILLED ORAL at 09:04

## 2024-04-22 RX ADMIN — SODIUM CHLORIDE, PRESERVATIVE FREE 10 ML: 5 INJECTION INTRAVENOUS at 05:04

## 2024-04-22 RX ADMIN — AMOXICILLIN 500 MG: 500 CAPSULE ORAL at 09:04

## 2024-04-22 RX ADMIN — SODIUM CHLORIDE, PRESERVATIVE FREE 20 ML: 5 INJECTION INTRAVENOUS at 07:04

## 2024-04-22 RX ADMIN — SODIUM CHLORIDE, PRESERVATIVE FREE 10 ML: 5 INJECTION INTRAVENOUS at 12:04

## 2024-04-22 RX ADMIN — NIFEDIPINE 30 MG: 30 TABLET, FILM COATED, EXTENDED RELEASE ORAL at 09:04

## 2024-04-22 NOTE — PLAN OF CARE
Problem:  Fall Injury Risk  Goal: Absence of Fall, Infant Drop and Related Injury  Outcome: Ongoing, Progressing     Problem: Adult Inpatient Plan of Care  Goal: Plan of Care Review  Outcome: Ongoing, Progressing  Goal: Absence of Hospital-Acquired Illness or Injury  Outcome: Ongoing, Progressing  Goal: Optimal Comfort and Wellbeing  Outcome: Ongoing, Progressing  Goal: Readiness for Transition of Care  Outcome: Ongoing, Progressing     Problem: Infection  Goal: Absence of Infection Signs and Symptoms  Outcome: Ongoing, Progressing      Number Of Freeze-Thaw Cycles: 1 freeze-thaw cycle Render Post-Care Instructions In Note?: yes Duration Of Freeze Thaw-Cycle (Seconds): 5 Detail Level: Detailed Consent: The patient's consent was obtained including but not limited to risks of crusting, scabbing, blistering, scarring, darker or lighter pigmentary change, recurrence, incomplete removal and infection. Post-Care Instructions: I reviewed with the patient in detail post-care instructions. Patient is to wear sunprotection, and avoid picking at any of the treated lesions. Pt may apply Vaseline to crusted or scabbing areas. Render Note In Bullet Format When Appropriate: No

## 2024-04-22 NOTE — PROGRESS NOTES
Progress Note  Maternal Fetal Medicine          Subjective:         Lily Mckeon is a 23 y.o.  female with twin IUP at 21w6d (with known IUFD of Twin A since 16w EGA) who presented 24 for leaking/bleeding (oligohydramnios of twin B since early second trimester).     No more bleeding. Some cramping in the morning.She has some morning sickness lately. No other issues. She is currently NPO for 3 HR OGTT.    Objective:         Temp:  [98.1 °F (36.7 °C)-98.9 °F (37.2 °C)] 98.7 °F (37.1 °C)  Pulse:  [] 90  Resp:  [16-18] 17  BP: (116-123)/(65-86) 123/86    Gen: NAD, A&Ox3  Pulm: Unlabored breathing, LCTAB  Card: RRR  Abd: FHT present, soft, nondistended, nontender to palpation, gravid uterus palpable c/w gestational age  Extremities: Palpable peripheral pulses, no pedal edema, 2+ DTRs x 4    FHTs baby B +   [place]: none  US: IUFD of Twin A, footling breech presentation (living twin), posterior placenta, MVP 2.0cm-Twin B. EFW 410g      Lab Review  Blood Type: O POS    Recent Results (from the past 24 hour(s))   Urinalysis    Collection Time: 24  2:55 PM   Result Value Ref Range    Color, UA Colorless Yellow, Light-Yellow, Colorless, Straw, Dark-Yellow    Appearance, UA Clear Clear    Specific Gravity, UA 1.004 (L) 1.005 - 1.030    pH, UA 7.0 5.0 - 8.5    Protein, UA Negative Negative    Glucose, UA Normal Negative, Normal    Ketones, UA Negative Negative    Blood, UA Negative Negative    Bilirubin, UA Negative Negative    Urobilinogen, UA Normal 0.2, 1.0, Normal    Nitrites, UA Negative Negative    Leukocyte Esterase, UA Negative Negative    WBC, UA None Seen None Seen, 0-2, 3-5, 0-5 /HPF    Bacteria, UA None Seen None Seen, Trace /HPF    Squamous Epithelial Cells, UA Trace None Seen /HPF    RBC, UA None Seen None Seen, 0-2, 3-5, 0-5 /HPF   Urine Culture High Risk    Collection Time: 24  3:01 PM    Specimen: Urine, Clean Catch   Result Value Ref Range    Urine Culture Less than 10,000  colonies/ml Gram-negative Rods (A)    CBC with Differential    Collection Time: 24  8:20 AM   Result Value Ref Range    WBC 9.17 4.50 - 11.50 x10(3)/mcL    RBC 3.64 (L) 4.20 - 5.40 x10(6)/mcL    Hgb 10.8 (L) 12.0 - 16.0 g/dL    Hct 32.0 (L) 37.0 - 47.0 %    MCV 87.9 80.0 - 94.0 fL    MCH 29.7 27.0 - 31.0 pg    MCHC 33.8 33.0 - 36.0 g/dL    RDW 12.6 11.5 - 17.0 %    Platelet 295 130 - 400 x10(3)/mcL    MPV 8.8 7.4 - 10.4 fL    Neut % 71.6 %    Lymph % 20.4 %    Mono % 5.5 %    Eos % 1.6 %    Basophil % 0.5 %    Lymph # 1.87 0.6 - 4.6 x10(3)/mcL    Neut # 6.56 2.1 - 9.2 x10(3)/mcL    Mono # 0.50 0.1 - 1.3 x10(3)/mcL    Eos # 0.15 0 - 0.9 x10(3)/mcL    Baso # 0.05 <=0.2 x10(3)/mcL    IG# 0.04 0 - 0.04 x10(3)/mcL    IG% 0.4 %    NRBC% 0.0 %   GTT Fasting    Collection Time: 24  8:20 AM   Result Value Ref Range    Glucose Fasting 90 70 - 100 mg/dL           Assessment:       23 y.o.  at 21w6d weeks gestation admitted for twin gestation with PPROM of twin B, vaginal bleeding, IUFD of Twin A diagnosed at 16w, cHTN, elevated msAFP    Plan:     1. Twins - previable PPROM Twin B  Patient's evaluation and exam is consistent with previable PPROM (rupture of membranes prior to 22 weeks 6 days gestation). Patient was counseled regarding the extremely poor prognosis of ruptured membranes at this early gestational age. We reviewed the maternal risks of previable premature rupture of membranes which include but are not limited to chorioamnionitis, endometritis, sepsis, potential need for hysterectomy and loss of fertility, VTE, and maternal death. We reviewed the fetal risks which include stillbirth, cord prolapse, infection, pulmonary hypoplasia, risk for musculoskeletal and facial anomalies due to severe oligohydramnios, and  death. We reviewed routine pulmonary maturation and the function of amniotic fluid in the normal growth of fetal lungs. We reviewed the unlikely chance of membranes resealing from  spontaneous rupture. I counseled the patient regarding management options which prior to 22 weeks (ie gestational age < 21 weeks 6 days) include conservative management, induction of labor, or dilation and evacuation. At a gestational age of 22 weeks 0 days and greater, conservative management is recommended at [facility].  Known IUFD of Twin A - diagnosed at 16w (fetus was measuring 13w6d at that time)    Recommendations:  - Latency antibiotics initiated.  - Confirm FHTs (Twin B) with doppler qshift. Continuous toco monitoring due to patient reporting intermittent cramping.  - Monitor for s/s infection, abruption. Currently afebrile. No evidence of leukocytosis- weekly CBC planned  - EFW q3 weeks (completed 4/16/24). No evidence of placenta previa on most recent ultrasound.  - Once weekly AFV/presentation, if admitted.   - Prior to PPROM and this hospital admission, we had extensively discussed the high risk for fetal morbidity/mortality given Twin B's severe oligohydramnios since 16w and significantly elevated AFP. These risks are compounded by PPROM and we discussed this even further today utilizing translating services with her significant other present in the room. They were without questions and verbalized understanding.   - VTE prophylaxis (SCDs ordered)  - 4/18- I addressed the previable status and poor prognosis with the patient and she does not feel comfortable being at home given the IUFD of baby A and guarded prognosis for baby B. She will remain inpatient until delivery.  - Steroids and magnesium at 23 weeks. Considering intervention at 23 weeks.  - 4/19- PICC done    2. cHTN  - Continue home med of Nifedipine 30mg XL daily.  - Monitor Bps as ordered.  - If she has a mild headache, may administer Tylenol PRN.    3. BMI 32.59  -Complicates all aspects of care    4. Constipation  - Colace 200mg daily    5. Nausea  - Consistent with pregnancy type symptoms.   - ordered Vitamin B6  - Zofran PRN    6. Abnormal  OGTT  - Failed 1 hr ogtt on 4/21 (early)  - 3 HR OGTT pending    Thank you for allowing us to participate in the care of your patient. If you have any questions/concerns, please do not hesitate to contact us.     Michelle Calvin MD  Maternal Fetal Medicine

## 2024-04-22 NOTE — PROGRESS NOTES
Antepartum Progress Note        Subjective:      Lily Mckeon is a 23 y.o.  @ 21w6d  who is admitted for management of PPROM  in the setting of twin gestation with fetal demise of Twin RAMON ISLAS. Denies vaginal bleeding. Denies dysuria . She denies abdominal pain, contractions , nausea and vomiting or decreased afetal movement.     Objective:      Temp:  [98.1 °F (36.7 °C)-98.9 °F (37.2 °C)] 98.7 °F (37.1 °C)  Pulse:  [] 90  Resp:  [16-18] 17  BP: (116-123)/(65-86) 123/86  Body mass index is 32.59 kg/m².     General: no acute distress, resting in bed   Resp: No increased WOB  CV: regular rate  Abd: soft, nt, nd, gravid  MSK: R brachial PICC line   Ext: No erythema or calf tenderness  .       FHT: 140 bpm      Assessment/Plan      IUP at  @ 21w6d admitted for management of PPROM .    Group & Rh   Date Value Ref Range Status   2024 O POS  Final     # PPROM   - Positive ROM +   - latency antibiotics :   Azithromycin 1 g on day 1 and day 5   S/p ampicillin IV 2 grams q 6 hours for 48 hours    D5  amoxicillin 500 mg TID ( day 3-10 )   - GC/ CT : negative   - GBS: negative   - wet prep: negative   - in house until delivery at 34 weeks    - BMZ and magnesium sulfate for Neuroprotection to be given at 23 weeks    - will continue to monitor vaginal bleeding. If patient complaints of abdominal pain, place on continuous tachometry      # Chronic HTN   - continue daily Procardia 30 XL   - ASA 81 daily   - will continue to monitor blood pressures      # Mono-Di twins with retained fetal demise of Twin A   - FHT q shift   - PNV daily     #Abnormal Glucose Testing   - 1 hr GTT O'Luu 190  - 3 hr glucose today . Will monitor result     #Anemia   - H/H 10.8/32.0   - will start PO iron            Discussed with staff      Fernanda Nelson MD   [facility], PGY2

## 2024-04-22 NOTE — PROGRESS NOTES
Inpatient Nutrition Evaluation    Admit Date: 4/16/2024   Total duration of encounter: 6 days   Patient Age: 23 y.o.    Nutrition Recommendation/Prescription     Continue regular diet as tolerated  If appetite decreased, consider ONS  Continue antiemetic and bowel regimen  Monitor labs, intake and weight    Nutrition Assessment     Chart Review    Reason Seen: length of stay    Malnutrition Screening Tool Results   Have you recently lost weight without trying?: No  Have you been eating poorly because of a decreased appetite?: No   MST Score: 0   Diagnosis:  PPROM in the setting of twin gestation with fetal demise of Twin A  Anemia   Abnormal glucose testing    Relevant Medical History: HTN    Scheduled Medications:  amoxicillin, 500 mg, Q8H  docusate sodium, 200 mg, Daily  famotidine, 20 mg, BID  NIFEdipine, 30 mg, Daily  prenatal vitamin, 1 tablet, Daily  pyridoxine (vitamin B6), 50 mg, Daily  sodium chloride 0.9%, 10 mL, Q6H    Continuous Infusions:   PRN Medications:   Current Facility-Administered Medications:     acetaminophen, 650 mg, Oral, Q6H PRN    diphenhydrAMINE, 25 mg, Oral, Q4H PRN    diphenhydrAMINE, 25 mg, Intravenous, Q4H PRN    ondansetron, 4 mg, Oral, Q8H PRN    psyllium husk (with sugar), 1 packet, Oral, Daily PRN    senna-docusate 8.6-50 mg, 1 tablet, Oral, Nightly PRN    simethicone, 1 tablet, Oral, Q6H PRN    sodium chloride 0.9%, 10 mL, Intravenous, PRN    Flushing PICC/Midline Protocol, , , Until Discontinued **AND** sodium chloride 0.9%, 10 mL, Intravenous, Q6H **AND** sodium chloride 0.9%, 10 mL, Intravenous, PRN    Recent Labs   Lab 04/16/24  1159 04/19/24  0643 04/22/24  0820   WBC 9.13 9.80 9.17   HGB 11.0* 11.2* 10.8*   HCT 32.2* 32.9* 32.0*     Nutrition Orders:  Diet Adult Regular      Appetite/Oral Intake: good/% of meals  Factors Affecting Nutritional Intake: none identified  Food/Tenriism/Cultural Preferences: unable to obtain  Food Allergies:  pineapple     Wound(s):   "    Comments    4/22: Pt NPO this AM for 3 hr glucose test, diet advanced to regular this afternoon. Per notes, no N/V or abdominal pain, constipation resolved. No reports of decreased appetite. Per MST, no reports of decreased appetite or unintentional weight loss PTA. Weight appears stable PTA per EMR weight history. Recent 4# weight loss likely fluid loss.    Anthropometrics    Height: 5' 3" (160 cm), Height Method: Stated  Last Weight: 83.5 kg (184 lb) (04/16/24 0900), Weight Method: Standard Scale  BMI (Calculated): 32.6  BMI Classification: not applicable     Ideal Body Weight (IBW), Female: 115 lb     % Ideal Body Weight, Female (lb): 160 %                             Usual Weight Provided By: EMR weight history    Wt Readings from Last 5 Encounters:   04/16/24 83.5 kg (184 lb)   04/09/24 85.3 kg (188 lb)   04/08/24 85.3 kg (188 lb 0.8 oz)   03/26/24 84.5 kg (186 lb 6.4 oz)   03/19/24 84.9 kg (187 lb 4.5 oz)     Weight Change(s) Since Admission:   Wt Readings from Last 1 Encounters:   04/16/24 0900 83.5 kg (184 lb)   Admit Weight: 83.5 kg (184 lb) (04/16/24 0900), Weight Method: Standard Scale    Patient Education     Not applicable.    Nutrition Goals & Monitoring     Dietitian will monitor: food and beverage intake, weight, and gastrointestinal profile    Nutrition Risk/Follow-Up: low (follow-up in 5-7 days)  Patients assigned 'low nutrition risk' status do not qualify for a full nutritional assessment but will be monitored and re-evaluated in a 5-7 day time period. Please consult if re-evaluation needed sooner.    "

## 2024-04-23 ENCOUNTER — TELEPHONE (OUTPATIENT)
Dept: OBSTETRICS AND GYNECOLOGY | Facility: HOSPITAL | Age: 24
End: 2024-04-23
Payer: MEDICAID

## 2024-04-23 PROBLEM — O31.22X1: Status: ACTIVE | Noted: 2024-04-23

## 2024-04-23 PROBLEM — O42.919 PRETERM PREMATURE RUPTURE OF MEMBRANES (PPROM) WITH UNKNOWN ONSET OF LABOR: Status: ACTIVE | Noted: 2024-04-23

## 2024-04-23 PROBLEM — O34.219 PREVIOUS CESAREAN DELIVERY AFFECTING PREGNANCY: Status: ACTIVE | Noted: 2024-04-23

## 2024-04-23 PROCEDURE — 11000001 HC ACUTE MED/SURG PRIVATE ROOM

## 2024-04-23 PROCEDURE — 25000003 PHARM REV CODE 250

## 2024-04-23 PROCEDURE — A4216 STERILE WATER/SALINE, 10 ML: HCPCS | Performed by: NURSE PRACTITIONER

## 2024-04-23 PROCEDURE — 99233 SBSQ HOSP IP/OBS HIGH 50: CPT | Mod: ,,, | Performed by: OBSTETRICS & GYNECOLOGY

## 2024-04-23 PROCEDURE — 25000003 PHARM REV CODE 250: Performed by: NURSE PRACTITIONER

## 2024-04-23 PROCEDURE — A4216 STERILE WATER/SALINE, 10 ML: HCPCS

## 2024-04-23 PROCEDURE — 25000003 PHARM REV CODE 250: Performed by: OBSTETRICS & GYNECOLOGY

## 2024-04-23 RX ADMIN — AMOXICILLIN 500 MG: 500 CAPSULE ORAL at 06:04

## 2024-04-23 RX ADMIN — NIFEDIPINE 30 MG: 30 TABLET, FILM COATED, EXTENDED RELEASE ORAL at 09:04

## 2024-04-23 RX ADMIN — SODIUM CHLORIDE, PRESERVATIVE FREE 10 ML: 5 INJECTION INTRAVENOUS at 11:04

## 2024-04-23 RX ADMIN — AMOXICILLIN 500 MG: 500 CAPSULE ORAL at 01:04

## 2024-04-23 RX ADMIN — AMOXICILLIN 500 MG: 500 CAPSULE ORAL at 08:04

## 2024-04-23 RX ADMIN — ONDANSETRON 4 MG: 4 TABLET, ORALLY DISINTEGRATING ORAL at 07:04

## 2024-04-23 RX ADMIN — Medication 10 ML: at 12:04

## 2024-04-23 RX ADMIN — PYRIDOXINE HCL TAB 50 MG 50 MG: 50 TAB at 10:04

## 2024-04-23 RX ADMIN — DOCUSATE SODIUM 200 MG: 100 CAPSULE, LIQUID FILLED ORAL at 09:04

## 2024-04-23 RX ADMIN — FAMOTIDINE 20 MG: 20 TABLET, FILM COATED ORAL at 09:04

## 2024-04-23 RX ADMIN — SODIUM CHLORIDE, PRESERVATIVE FREE 10 ML: 5 INJECTION INTRAVENOUS at 06:04

## 2024-04-23 RX ADMIN — PRENATAL VITAMINS-IRON FUMARATE 27 MG IRON-FOLIC ACID 0.8 MG TABLET 1 TABLET: at 09:04

## 2024-04-23 RX ADMIN — SODIUM CHLORIDE, PRESERVATIVE FREE 10 ML: 5 INJECTION INTRAVENOUS at 12:04

## 2024-04-23 RX ADMIN — SODIUM CHLORIDE, PRESERVATIVE FREE 10 ML: 5 INJECTION INTRAVENOUS at 05:04

## 2024-04-23 RX ADMIN — SODIUM CHLORIDE, PRESERVATIVE FREE 10 ML: 5 INJECTION INTRAVENOUS at 01:04

## 2024-04-23 RX ADMIN — FAMOTIDINE 20 MG: 20 TABLET, FILM COATED ORAL at 08:04

## 2024-04-23 NOTE — PROGRESS NOTES
Progress Note  Maternal Fetal Medicine          Subjective:         Lily Mckeon is a 23 y.o.  female with twin IUP at 22w0d (with known IUFD of Twin A since 16w EGA) who presented 24 for leaking/bleeding (oligohydramnios of twin B since early second trimester).     She has had some more light bleeding but no cramping. She passed 3hr OGTT. She has some morning sickness improved with meds.     Objective:         Temp:  [98.6 °F (37 °C)-98.8 °F (37.1 °C)] 98.6 °F (37 °C)  Pulse:  [] 100  Resp:  [16-17] 16  BP: (117-121)/(58-72) 119/58    Gen: NAD, A&Ox3  Pulm: Unlabored breathing, LCTAB  Card: RRR  Abd: FHT present, soft, nondistended, nontender to palpation, gravid uterus palpable c/w gestational age  Extremities: Palpable peripheral pulses, no pedal edema, 2+ DTRs x 4    FHTs baby B +   [place]: none  US:  IUFD of Twin A, footling breech presentation (living twin), posterior placenta, MVP 2.0cm-Twin B. EFW 410g  : Breech, FHTs+, EFW 479g, MVP 1.85cm, oligohydramnios.      Lab Review  Blood Type: O POS    Recent Results (from the past 24 hour(s))   GTT 3 Hour    Collection Time: 24 11:31 AM   Result Value Ref Range    Glucose 3 Hour 85 70 - 115 mg/dL   Pathologist Interpretation    Collection Time: 24 11:31 AM   Result Value Ref Range    Pathology Review       No demonstrated impairment or other abnormality of glucose tolerance.    Len Kang M.D.            Assessment:       23 y.o.  at 22w0d weeks gestation admitted for twin gestation with PPROM of twin B, vaginal bleeding, IUFD of Twin A diagnosed at 16w, cHTN, elevated msAFP    Plan:     1. Twins - previable PPROM Twin B  Patient's evaluation and exam is consistent with previable PPROM (rupture of membranes prior to 22 weeks 6 days gestation). Patient was counseled regarding the extremely poor prognosis of ruptured membranes at this early gestational age. We reviewed the maternal risks of previable  premature rupture of membranes which include but are not limited to chorioamnionitis, endometritis, sepsis, potential need for hysterectomy and loss of fertility, VTE, and maternal death. We reviewed the fetal risks which include stillbirth, cord prolapse, infection, pulmonary hypoplasia, risk for musculoskeletal and facial anomalies due to severe oligohydramnios, and  death. We reviewed routine pulmonary maturation and the function of amniotic fluid in the normal growth of fetal lungs. We reviewed the unlikely chance of membranes resealing from spontaneous rupture. I counseled the patient regarding management options which prior to 22 weeks (ie gestational age < 21 weeks 6 days) include conservative management, induction of labor, or dilation and evacuation. At a gestational age of 22 weeks 0 days and greater, conservative management is recommended at [facility].  Known IUFD of Twin A - diagnosed at 16w (fetus was measuring 13w6d at that time)    Recommendations:  - Latency antibiotics initiated.  - Confirm FHTs (Twin B) with doppler qshift. Continuous toco monitoring due to patient reporting intermittent cramping.  - Monitor for s/s infection, abruption. Currently afebrile. No evidence of leukocytosis- weekly CBC planned  - EFW q3 weeks (completed 24). No evidence of placenta previa on most recent ultrasound.  - Once weekly AFV/presentation, if admitted.   - Prior to PPROM and this hospital admission, we had extensively discussed the high risk for fetal morbidity/mortality given Twin B's severe oligohydramnios since 16w and significantly elevated AFP. These risks are compounded by PPROM and we discussed this even further today utilizing translating services with her significant other present in the room. They were without questions and verbalized understanding.   - VTE prophylaxis (SCDs ordered)  - - I addressed the previable status and poor prognosis with the patient and she does not feel  comfortable being at home given the IUFD of baby A and guarded prognosis for baby B. She will remain inpatient until delivery.  - Steroids and magnesium at 23 weeks. Considering intervention at 23 weeks.  - 4/19- PICC done    2. cHTN  - Continue home med of Nifedipine 30mg XL daily.  - Monitor Bps as ordered.  - If she has a mild headache, may administer Tylenol PRN.    3. BMI 32.59  -Complicates all aspects of care    4. Constipation  - Colace 200mg daily    5. Nausea  - Consistent with pregnancy type symptoms.   - ordered Vitamin B6  - Zofran PRN    6. Abnormal OGTT  - Failed 1 hr ogtt on 4/21 (early)  - Passed early 3hr OGTT  - Plan will be repeat glucose test between 24-28 weeks (at least 1 week after steroids). OK to skip glucola and go to 3 HR if patient desires.    Thank you for allowing us to participate in the care of your patient. If you have any questions/concerns, please do not hesitate to contact us.     Michelle Calvin MD  Maternal Fetal Medicine

## 2024-04-23 NOTE — LETTER
April 23, 2024    Lily Mckeon  [address]  [place] [address]             [facility]  [address]  [place] [address]  Phone: [phone] April 23, 2024     Patient: Lily Mckeon   YOB: 2000   Date of Visit: 4/23/2024       To Whom it May Concern:    Lily Mckeon presented to OB-ED on 4/23/2024 and remained under my care in-patient until delivery. She will not return to work at this time. Patient will be away from work during the next several weeks in postpartum.    Please excuse her from any work missed.    If you have any questions or concerns, please don't hesitate to call.    Sincerely,         Conrado Tony MD

## 2024-04-23 NOTE — TELEPHONE ENCOUNTER
Letter to excuse patient from work drafted. Copy printed, signed and handed to patient.    Conrado Tony MD, MBS  [facility] Family Medicine Resident, [facility]  12:01 PM.

## 2024-04-23 NOTE — LETTER
April 23, 2024    Lily Mckeon  [address]  [place] [address]             [facility] OBGYN  Obstetrics and Gynecology  [address]  [place] [address]  Phone:[phone] April 23, 2024     Patient: Lily Mckeon   YOB: 2000   Date of Visit: 4/23/2024       To Whom it May Concern:    Lily Mckeon presented to [facility] 4/16/2024 for obstetrics reasons. She will remain under my care in-patient until delivery.    Please excuse her from any work missed. She will also remain away from work for several weeks during post-partum.    If you have any questions or concerns, please don't hesitate to call.    Sincerely,         Conrado Tony MD

## 2024-04-23 NOTE — PROGRESS NOTES
Antepartum Progress Note        Subjective:      Lily Mckeon is a 23 y.o.  @ 22w0d  who is admitted for management of PPROM  in the setting of twin gestation with fetal demise of Twin RAMON ISLAS. Denies vaginal bleeding. Denies dysuria . She denies abdominal pain, contractions , nausea and vomiting or decreased afetal movement.     Objective:      Temp:  [98.6 °F (37 °C)-98.8 °F (37.1 °C)] 98.6 °F (37 °C)  Pulse:  [] 100  Resp:  [16-17] 16  BP: (117-121)/(58-72) 119/58  Body mass index is 32.59 kg/m².     General: no acute distress, resting in bed   Resp: No increased WOB  CV: regular rate  Abd: soft, nt, nd, gravid  MSK: R brachial PICC line   Ext: No erythema or calf tenderness  .   FHT: 145 bpm     Growth imaging Dr. Calvin  :   Twin B is breech.    bpm .   Posterior placenta    grams   MVP 2 cm      Assessment/Plan      IUP at  @ 22w0d admitted for management of PPROM .    Group & Rh   Date Value Ref Range Status   2024 O POS  Final     # PPROM   - Positive ROM +   - latency antibiotics :   Azithromycin 1 g on day 1 and day 5   S/p ampicillin IV 2 grams q 6 hours for 48 hours    D6  amoxicillin 500 mg TID ( day 3-10 )   - GC/ CT : negative   - GBS: negative   - wet prep: negative   - in house until delivery at 34 weeks    - BMZ and magnesium sulfate for Neuroprotection to be given at 23 weeks    - will continue to monitor vaginal bleeding. If patient complaints of abdominal pain, place on continuous tachometry      # Chronic HTN   - continue daily Procardia 30 XL   - ASA 81 daily   - will continue to monitor blood pressures      # Mono-Di twins with retained fetal demise of Twin A   - FHT q shift   - PNV daily     #Abnormal Glucose Testing   - 1 hr GTT O'Luu 190  - 3 hr glucose normal     #Anemia   - H/H 10.8/32.0   - will start PO iron         Discussed with staff      Fernanda Nelson MD   [facility], PGY2

## 2024-04-24 PROCEDURE — A4216 STERILE WATER/SALINE, 10 ML: HCPCS | Performed by: NURSE PRACTITIONER

## 2024-04-24 PROCEDURE — 25000003 PHARM REV CODE 250: Performed by: NURSE PRACTITIONER

## 2024-04-24 PROCEDURE — 25000003 PHARM REV CODE 250: Performed by: OBSTETRICS & GYNECOLOGY

## 2024-04-24 PROCEDURE — 11000001 HC ACUTE MED/SURG PRIVATE ROOM

## 2024-04-24 PROCEDURE — 99233 SBSQ HOSP IP/OBS HIGH 50: CPT | Mod: ,,, | Performed by: NURSE PRACTITIONER

## 2024-04-24 PROCEDURE — 25000003 PHARM REV CODE 250

## 2024-04-24 RX ADMIN — SODIUM CHLORIDE, PRESERVATIVE FREE 10 ML: 5 INJECTION INTRAVENOUS at 12:04

## 2024-04-24 RX ADMIN — FAMOTIDINE 20 MG: 20 TABLET, FILM COATED ORAL at 08:04

## 2024-04-24 RX ADMIN — PYRIDOXINE HCL TAB 50 MG 50 MG: 50 TAB at 08:04

## 2024-04-24 RX ADMIN — DOCUSATE SODIUM 200 MG: 100 CAPSULE, LIQUID FILLED ORAL at 08:04

## 2024-04-24 RX ADMIN — AMOXICILLIN 500 MG: 500 CAPSULE ORAL at 06:04

## 2024-04-24 RX ADMIN — AMOXICILLIN 500 MG: 500 CAPSULE ORAL at 03:04

## 2024-04-24 RX ADMIN — ONDANSETRON 4 MG: 4 TABLET, ORALLY DISINTEGRATING ORAL at 12:04

## 2024-04-24 RX ADMIN — NIFEDIPINE 30 MG: 30 TABLET, FILM COATED, EXTENDED RELEASE ORAL at 08:04

## 2024-04-24 RX ADMIN — ACETAMINOPHEN 650 MG: 325 TABLET, FILM COATED ORAL at 01:04

## 2024-04-24 RX ADMIN — SODIUM CHLORIDE, PRESERVATIVE FREE 10 ML: 5 INJECTION INTRAVENOUS at 06:04

## 2024-04-24 RX ADMIN — AMOXICILLIN 500 MG: 500 CAPSULE ORAL at 09:04

## 2024-04-24 RX ADMIN — PRENATAL VITAMINS-IRON FUMARATE 27 MG IRON-FOLIC ACID 0.8 MG TABLET 1 TABLET: at 08:04

## 2024-04-24 RX ADMIN — FAMOTIDINE 20 MG: 20 TABLET, FILM COATED ORAL at 09:04

## 2024-04-24 NOTE — PROGRESS NOTES
Antepartum Progress Note        Subjective:      Lily Mckeon is a 23 y.o.  @ 22w1d   who is admitted for management of PPROM  in the setting of twin gestation with fetal demise of Twin ALissette ISLAS. Had two episodes of small vaginal bleeding overnight. At the time of the 0100 bleed, she felt abdominal pain. Denies contractions or abdominal pain since then. She continues to have morning sickness that resolves with Zofran.     Objective:      Temp:  [98.1 °F (36.7 °C)-98.9 °F (37.2 °C)] 98.9 °F (37.2 °C)  Pulse:  [] 102  Resp:  [16-18] 18  SpO2:  [99 %] 99 %  BP: (107-133)/(59-82) 133/82  Body mass index is 32.59 kg/m².     General: no acute distress, resting in bed   Resp: No increased WOB  CV: regular rate  Abd: soft, nt, nd, gravid  MSK: R brachial PICC line   Ext: No erythema or calf tenderness  .   FHT: 145 bpm      Growth imaging Dr. Calvin  :   Twin B is breech.    bpm .   Posterior placenta    grams   MVP 2 cm      Assessment/Plan      IUP at  @ 22w0d admitted for management of PPROM .    Group & Rh   Date Value Ref Range Status   2024 O POS  Final     # PPROM   - Positive ROM +   - latency antibiotics :   Azithromycin 1 g on day 1 and day 5   S/p ampicillin IV 2 grams q 6 hours for 48 hours    D7  amoxicillin 500 mg TID ( day 3-10 )   - GC/ CT : negative   - GBS: negative   - wet prep: negative   - in house until delivery at 34 weeks    - BMZ and magnesium sulfate for Neuroprotection to be given at 23 weeks    - will continue to monitor vaginal bleeding. If patient complaints of abdominal pain, place on continuous tachometry      # Chronic HTN   - continue daily Procardia 30 XL   - ASA 81 daily   - will continue to monitor blood pressures      # Mono-Di twins with retained fetal demise of Twin A   - FHT q shift   - PNV daily     #Abnormal Glucose Testing   - 1 hr GTT O'Luu 190  - 3 hr glucose normal     #Anemia   - H/H 10.8/32.0   - will start PO iron          Discussed with staff      Fernanda Nelson MD   [facility], PGY2

## 2024-04-24 NOTE — PROGRESS NOTES
Progress Note  Maternal Fetal Medicine          Subjective:      IUP at 22w1d - twin IUP at 22w0d (with known IUFD of Twin A since 16w EGA) who presented 24 for leaking/bleeding (oligohydramnios of twin B since early second trimester).     She reports two episodes of vaginal bleeding during the night. She took pictures on her phone and presented them at the time of rounds. First picture was at approx 1900 last night when ambulating to the bathroom. Small amount (half dollar sized) of watery brownish pink blood noted on peripad in picture. Second picture was at approx 0100 - kiwi sized area of watery brownish pink blood noted on peripad in picture. Reports she had slight cramping when getting up to ambulate then noticed the blood tinged fluid that leaked out. Denies any leaking or bleeding since then. Denies abd pain/cramping at present. Denies foul vaginal odor, malaise, chills, aches. Afebrile. Reports positive fetal movement. Instructed to notify staff if experiences VB.    Nursing staff without concerns.     Objective:         Temp:  [98.1 °F (36.7 °C)-98.9 °F (37.2 °C)] 98.9 °F (37.2 °C)  Pulse:  [] 102  Resp:  [16-18] 18  SpO2:  [99 %] 99 %  BP: (107-133)/(59-82) 133/82    Gen: NAD, A&Ox3  Pulm: Unlabored breathing, LCTAB  Card: RRR  Abd: FHT present, soft, nondistended, nontender to palpation, gravid uterus palpable c/w gestational age  Extremities: Palpable peripheral pulses, no pedal edema, 2+ DTRs x 4    FHTs baby B + (checking q shift)  Dubberly: none  : Breech, FHTs+, Twin B EFW 479g, MVP 1.85cm, oligohydramnios.      Lab Review  Blood Type: O POS    No results found for this or any previous visit (from the past 24 hour(s)).    Assessment:       23 y.o.  at 22w1d weeks gestation admitted for twin gestation with PPROM of twin B, vaginal bleeding, IUFD of Twin A diagnosed at 16w, cHTN, elevated msAFP     Active Hospital Problems    Diagnosis  POA    Continuing pregnancy after intrauterine  death of one fetus or more, second trimester, fetus 1 [O31.22X1]  Yes     premature rupture of membranes (PPROM) with unknown onset of labor- Twin B [O42.919]  Yes    4. Chronic hypertension affecting pregnancy [O10.919]  Yes      Resolved Hospital Problems   No resolved problems to display.        Plan:     1.  Twins - previable PPROM Twin B  Patient's evaluation and exam is consistent with previable PPROM (rupture of membranes prior to 22 weeks 6 days gestation). Patient was counseled regarding the extremely poor prognosis of ruptured membranes at this early gestational age. We reviewed the maternal risks of previable premature rupture of membranes which include but are not limited to chorioamnionitis, endometritis, sepsis, potential need for hysterectomy and loss of fertility, VTE, and maternal death. We reviewed the fetal risks which include stillbirth, cord prolapse, infection, pulmonary hypoplasia, risk for musculoskeletal and facial anomalies due to severe oligohydramnios, and  death. We reviewed routine pulmonary maturation and the function of amniotic fluid in the normal growth of fetal lungs. We reviewed the unlikely chance of membranes resealing from spontaneous rupture. I counseled the patient regarding management options which prior to 22 weeks (ie gestational age < 21 weeks 6 days) include conservative management, induction of labor, or dilation and evacuation. At a gestational age of 22 weeks 0 days and greater, conservative management is recommended at [facility].  Known IUFD of Twin A - diagnosed at 16w (fetus was measuring 13w6d at that time)     Recommendations:  - Latency antibiotics initiated.  - Confirm FHTs (Twin B) with doppler qshift. Continuous toco monitoring due to patient reporting intermittent cramping.  - Monitor for s/s infection, abruption. Currently afebrile. No evidence of leukocytosis- weekly CBC planned  - EFW q3 weeks (completed 24). No evidence of placenta  previa on most recent ultrasound.  - Once weekly AFV/presentation, if admitted.   - Prior to PPROM and this hospital admission, we had extensively discussed the high risk for fetal morbidity/mortality given Twin B's severe oligohydramnios since 16w and significantly elevated AFP. These risks are compounded by PPROM and we discussed this even further today utilizing translating services with her significant other present in the room. They were without questions and verbalized understanding.   - VTE prophylaxis (SCDs ordered)  - 4/18- I addressed the previable status and poor prognosis with the patient and she does not feel comfortable being at home given the IUFD of baby A and guarded prognosis for baby B. She will remain inpatient until delivery.  - Steroids and magnesium at 23 weeks. Considering intervention at 23 weeks.  - 4/19- PICC done     2. cHTN  - Continue home med of Nifedipine 30mg XL daily.  - Monitor Bps as ordered.  - If she has a mild headache, may administer Tylenol PRN.     3. BMI 32.59  -Complicates all aspects of care     4. Constipation  - Colace 200mg daily     5. Nausea  - Consistent with pregnancy type symptoms.   - ordered Vitamin B6  - Zofran PRN     6. Abnormal OGTT  - Failed 1 hr ogtt on 4/21 (early)  - Passed early 3hr OGTT  - Plan will be repeat glucose test between 24-28 weeks (at least 1 week after steroids). OK to skip glucola and go to 3 HR if patient desires.       Thank you for allowing us to participate in the care of your patient. If you have any questions/concerns, please do not hesitate to contact us.     Randi Cooper, MSN, APRN, WHNP-BC  Maternal Fetal Medicine

## 2024-04-25 LAB
BASOPHILS # BLD AUTO: 0.03 X10(3)/MCL
BASOPHILS NFR BLD AUTO: 0.3 %
EOSINOPHIL # BLD AUTO: 0.15 X10(3)/MCL (ref 0–0.9)
EOSINOPHIL NFR BLD AUTO: 1.7 %
ERYTHROCYTE [DISTWIDTH] IN BLOOD BY AUTOMATED COUNT: 12.6 % (ref 11.5–17)
GROUP & RH: NORMAL
HCT VFR BLD AUTO: 30 % (ref 37–47)
HGB BLD-MCNC: 10.1 G/DL (ref 12–16)
IMM GRANULOCYTES # BLD AUTO: 0.03 X10(3)/MCL (ref 0–0.04)
IMM GRANULOCYTES NFR BLD AUTO: 0.3 %
INDIRECT COOMBS: NORMAL
LYMPHOCYTES # BLD AUTO: 2.25 X10(3)/MCL (ref 0.6–4.6)
LYMPHOCYTES NFR BLD AUTO: 25.7 %
MCH RBC QN AUTO: 29.4 PG (ref 27–31)
MCHC RBC AUTO-ENTMCNC: 33.7 G/DL (ref 33–36)
MCV RBC AUTO: 87.5 FL (ref 80–94)
MONOCYTES # BLD AUTO: 0.51 X10(3)/MCL (ref 0.1–1.3)
MONOCYTES NFR BLD AUTO: 5.8 %
NEUTROPHILS # BLD AUTO: 5.77 X10(3)/MCL (ref 2.1–9.2)
NEUTROPHILS NFR BLD AUTO: 66.2 %
NRBC BLD AUTO-RTO: 0 %
PLATELET # BLD AUTO: 267 X10(3)/MCL (ref 130–400)
PMV BLD AUTO: 8.8 FL (ref 7.4–10.4)
RBC # BLD AUTO: 3.43 X10(6)/MCL (ref 4.2–5.4)
SPECIMEN OUTDATE: NORMAL
WBC # SPEC AUTO: 8.74 X10(3)/MCL (ref 4.5–11.5)

## 2024-04-25 PROCEDURE — 86850 RBC ANTIBODY SCREEN: CPT

## 2024-04-25 PROCEDURE — 99233 SBSQ HOSP IP/OBS HIGH 50: CPT | Mod: ,,, | Performed by: NURSE PRACTITIONER

## 2024-04-25 PROCEDURE — 25000003 PHARM REV CODE 250: Performed by: OBSTETRICS & GYNECOLOGY

## 2024-04-25 PROCEDURE — 25000003 PHARM REV CODE 250: Performed by: NURSE PRACTITIONER

## 2024-04-25 PROCEDURE — 11000001 HC ACUTE MED/SURG PRIVATE ROOM

## 2024-04-25 PROCEDURE — 85025 COMPLETE CBC W/AUTO DIFF WBC: CPT

## 2024-04-25 PROCEDURE — 25000003 PHARM REV CODE 250

## 2024-04-25 PROCEDURE — A4216 STERILE WATER/SALINE, 10 ML: HCPCS | Performed by: NURSE PRACTITIONER

## 2024-04-25 RX ADMIN — SODIUM CHLORIDE, PRESERVATIVE FREE 10 ML: 5 INJECTION INTRAVENOUS at 12:04

## 2024-04-25 RX ADMIN — ONDANSETRON 4 MG: 4 TABLET, ORALLY DISINTEGRATING ORAL at 08:04

## 2024-04-25 RX ADMIN — FAMOTIDINE 20 MG: 20 TABLET, FILM COATED ORAL at 09:04

## 2024-04-25 RX ADMIN — PYRIDOXINE HCL TAB 50 MG 50 MG: 50 TAB at 08:04

## 2024-04-25 RX ADMIN — SODIUM CHLORIDE, PRESERVATIVE FREE 10 ML: 5 INJECTION INTRAVENOUS at 06:04

## 2024-04-25 RX ADMIN — PRENATAL VITAMINS-IRON FUMARATE 27 MG IRON-FOLIC ACID 0.8 MG TABLET 1 TABLET: at 08:04

## 2024-04-25 RX ADMIN — AMOXICILLIN 500 MG: 500 CAPSULE ORAL at 09:04

## 2024-04-25 RX ADMIN — AMOXICILLIN 500 MG: 500 CAPSULE ORAL at 06:04

## 2024-04-25 RX ADMIN — DOCUSATE SODIUM 200 MG: 100 CAPSULE, LIQUID FILLED ORAL at 08:04

## 2024-04-25 RX ADMIN — SODIUM CHLORIDE, PRESERVATIVE FREE 10 ML: 5 INJECTION INTRAVENOUS at 11:04

## 2024-04-25 RX ADMIN — AMOXICILLIN 500 MG: 500 CAPSULE ORAL at 01:04

## 2024-04-25 RX ADMIN — FAMOTIDINE 20 MG: 20 TABLET, FILM COATED ORAL at 08:04

## 2024-04-25 RX ADMIN — NIFEDIPINE 30 MG: 30 TABLET, FILM COATED, EXTENDED RELEASE ORAL at 08:04

## 2024-04-25 RX ADMIN — SODIUM CHLORIDE, PRESERVATIVE FREE 10 ML: 5 INJECTION INTRAVENOUS at 07:04

## 2024-04-25 NOTE — PROGRESS NOTES
Progress Note  Maternal Fetal Medicine          Subjective:      IUP at 22w2d - twin IUP at 22w0d (with known IUFD of Twin A since 16w EGA) who presented 4/16/24 for leaking/bleeding (oligohydramnios of twin B since early second trimester).     She reports sleeping well last night. She specifically denies vaginal leaking, abd pain/contractions/tenderness, foul vaginal odor, malaise, chills, aches. Afebrile. She reports two episodes of vaginal spotting. One episode at 1700 last night and again at 0500 this AM. She provided pictures of her peripad - both episodes were light brown in color and scant-small amount. She states she has not had any further episodes since earlier this morning. She has complaints of nausea this morning (common complaint for her as she still is struggling with morning sickness). Nurse will bring PRN meds.    Nursing staff without concerns.     Objective:         Temp:  [97.8 °F (36.6 °C)-98.9 °F (37.2 °C)] 98.6 °F (37 °C)  Pulse:  [] 96  Resp:  [16-18] 16  SpO2:  [98 %-99 %] 98 %  BP: (111-133)/(59-88) 122/79    Gen: NAD, A&Ox3  Pulm: Unlabored breathing, LCTAB  Card: RRR  Abd: FHT present, soft, nondistended, nontender to palpation, gravid uterus   Extremities: Palpable peripheral pulses, no pedal edema, 2+ DTRs x 4    FHTs confirmed   Denies contractions  4/23: Breech, FHTs+, Twin B EFW 479g, MVP 1.85cm, oligohydramnios.       Lab Review  Blood Type: O POS    Recent Results (from the past 24 hour(s))   Type & Screen    Collection Time: 04/25/24  6:28 AM   Result Value Ref Range    Group & Rh O POS     Indirect Martir GEL NEG     Specimen Outdate 04/28/2024 23:59    CBC with Differential    Collection Time: 04/25/24  6:28 AM   Result Value Ref Range    WBC 8.74 4.50 - 11.50 x10(3)/mcL    RBC 3.43 (L) 4.20 - 5.40 x10(6)/mcL    Hgb 10.1 (L) 12.0 - 16.0 g/dL    Hct 30.0 (L) 37.0 - 47.0 %    MCV 87.5 80.0 - 94.0 fL    MCH 29.4 27.0 - 31.0 pg    MCHC 33.7 33.0 - 36.0 g/dL    RDW 12.6 11.5 -  17.0 %    Platelet 267 130 - 400 x10(3)/mcL    MPV 8.8 7.4 - 10.4 fL    Neut % 66.2 %    Lymph % 25.7 %    Mono % 5.8 %    Eos % 1.7 %    Basophil % 0.3 %    Lymph # 2.25 0.6 - 4.6 x10(3)/mcL    Neut # 5.77 2.1 - 9.2 x10(3)/mcL    Mono # 0.51 0.1 - 1.3 x10(3)/mcL    Eos # 0.15 0 - 0.9 x10(3)/mcL    Baso # 0.03 <=0.2 x10(3)/mcL    IG# 0.03 0 - 0.04 x10(3)/mcL    IG% 0.3 %    NRBC% 0.0 %       Assessment:       23 y.o.  at 22w2d weeks gestation admitted for twin gestation with PPROM of twin B, vaginal bleeding, IUFD of Twin A diagnosed at 16w, cHTN, elevated msAFP   Active Hospital Problems    Diagnosis  POA    Continuing pregnancy after intrauterine death of one fetus or more, second trimester, fetus 1 [O31.22X1]  Yes     premature rupture of membranes (PPROM) with unknown onset of labor- Twin B [O42.919]  Yes    4. Chronic hypertension affecting pregnancy [O10.919]  Yes      Resolved Hospital Problems   No resolved problems to display.        Plan:     1. Twins - previable PPROM Twin B  Patient's evaluation and exam is consistent with previable PPROM (rupture of membranes prior to 22 weeks 6 days gestation). Patient was counseled regarding the extremely poor prognosis of ruptured membranes at this early gestational age. We reviewed the maternal risks of previable premature rupture of membranes which include but are not limited to chorioamnionitis, endometritis, sepsis, potential need for hysterectomy and loss of fertility, VTE, and maternal death. We reviewed the fetal risks which include stillbirth, cord prolapse, infection, pulmonary hypoplasia, risk for musculoskeletal and facial anomalies due to severe oligohydramnios, and  death. We reviewed routine pulmonary maturation and the function of amniotic fluid in the normal growth of fetal lungs. We reviewed the unlikely chance of membranes resealing from spontaneous rupture. I counseled the patient regarding management options which prior to 22  weeks (ie gestational age < 21 weeks 6 days) include conservative management, induction of labor, or dilation and evacuation. At a gestational age of 22 weeks 0 days and greater, conservative management is recommended at [facility].  Known IUFD of Twin A - diagnosed at 16w (fetus was measuring 13w6d at that time)     Recommendations:  - Latency antibiotics initiated.  - Confirm FHTs (Twin B) with doppler qshift. Continuous toco monitoring due to patient reporting intermittent cramping.  - Monitor for s/s infection, abruption. Currently afebrile. No evidence of leukocytosis- weekly CBC planned  - EFW q3 weeks (completed 4/23/24). No evidence of placenta previa on most recent ultrasound.  - Once weekly AFV/presentation, if admitted.   - Prior to PPROM and this hospital admission, we had extensively discussed the high risk for fetal morbidity/mortality given Twin B's severe oligohydramnios since 16w and significantly elevated AFP. These risks are compounded by PPROM and we discussed this even further today utilizing translating services with her significant other present in the room. They were without questions and verbalized understanding.   - VTE prophylaxis (SCDs ordered)  - 4/18- I addressed the previable status and poor prognosis with the patient and she does not feel comfortable being at home given the IUFD of baby A and guarded prognosis for baby B. She will remain inpatient until delivery.  - Steroids and magnesium at 23 weeks. Considering intervention at 23 weeks.  - 4/19- PICC done     2. cHTN  - Continue home med of Nifedipine 30mg XL daily.  - Monitor Bps as ordered.  - If she has a mild headache, may administer Tylenol PRN.     3. BMI 32.59  -Complicates all aspects of care     4. Constipation  - Colace 200mg daily     5. Nausea  - Consistent with pregnancy type symptoms.   - ordered Vitamin B6  - Zofran PRN     6. Abnormal OGTT  - Failed 1 hr ogtt on 4/21 (early)  - Passed early 3hr OGTT  - Plan will be  repeat glucose test between 24-28 weeks (at least 1 week after steroids). OK to skip glucola and go to 3 HR if patient desires.       Thank you for allowing us to participate in the care of your patient. If you have any questions/concerns, please do not hesitate to contact us.     Randi Cooper, MSN, APRN, NP-BC  Maternal Fetal Medicine

## 2024-04-25 NOTE — PROGRESS NOTES
Antepartum Progress Note        Subjective:      Lily Mckeon is a 23 y.o.  @22w2d who is admitted for management of PPROM  in the setting of twin gestation with fetal demise of Twin A.    ROSHAN. Had two episodes of small vaginal bleeding overnight.Denies abdominal pain. Denies nausea or vomiting this year. Discussed the risk of  labor and placental abruption with the patient with respect to the persistent vaginal bleeding. She endorsed understanding and states that she will alert RN if she notices a large volume bleed or has abdominal pain.   Objective:      Temp:  [97.7 °F (36.5 °C)-98.9 °F (37.2 °C)] 97.7 °F (36.5 °C)  Pulse:  [] 96  Resp:  [16-18] 16  SpO2:  [98 %-99 %] 98 %  BP: (111-133)/(59-88) 122/79  Body mass index is 32.59 kg/m².     General: no acute distress, sitting bedside   Resp: No increased WOB  CV: regular rate  Abd: soft, nt, nd, gravid  MSK: R brachial PICC line   Ext: No erythema or calf tenderness  .   FHT: 145 bpm      Growth imaging Dr. Calvin  :   Twin B is breech.    bpm .   Posterior placenta    grams   MVP 2 cm      Assessment/Plan      IUP at  @ 22w2d admitted for management of PPROM .    Group & Rh   Date Value Ref Range Status   2024 O POS  Final     # PPROM   - Positive ROM +   - latency antibiotics :   Azithromycin 1 g on day 1 and day 5   S/p ampicillin IV 2 grams q 6 hours for 48 hours    D8  amoxicillin 500 mg TID ( day 3-10 )   - GC/ CT : negative   - GBS: negative   - wet prep: negative   - in house until delivery at 34 weeks    - BMZ and magnesium sulfate for Neuroprotection to be given at 23 weeks    - will continue to monitor vaginal bleeding. If patient complaints of abdominal pain, place on continuous tocometer      # Chronic HTN   - continue daily Procardia 30 XL   - ASA 81 daily   - will continue to monitor blood pressures      # Mono-Di twins with retained fetal demise of Twin A   - FHT q shift   - PNV daily      #Glucose testing   - 1 hr GTT O'Luu 190  - 3 hr glucose normal   - will repeat Glucola at 24-28 weeks. Needs to be completed at least 7 days AFTER corticosteroids administration @ 23 weeks     #Anemia   - H/H 10.1/30.0   - will start PO iron         Discussed with staff      Fernanda Nelson MD   [facility], PGY2

## 2024-04-26 PROCEDURE — A4216 STERILE WATER/SALINE, 10 ML: HCPCS | Performed by: NURSE PRACTITIONER

## 2024-04-26 PROCEDURE — 25000003 PHARM REV CODE 250: Performed by: NURSE PRACTITIONER

## 2024-04-26 PROCEDURE — 25000003 PHARM REV CODE 250: Performed by: OBSTETRICS & GYNECOLOGY

## 2024-04-26 PROCEDURE — 11000001 HC ACUTE MED/SURG PRIVATE ROOM

## 2024-04-26 PROCEDURE — 25000003 PHARM REV CODE 250

## 2024-04-26 PROCEDURE — 99233 SBSQ HOSP IP/OBS HIGH 50: CPT | Mod: ,,, | Performed by: NURSE PRACTITIONER

## 2024-04-26 RX ORDER — POLYETHYLENE GLYCOL 3350 17 G/17G
17 POWDER, FOR SOLUTION ORAL DAILY
Status: DISCONTINUED | OUTPATIENT
Start: 2024-04-26 | End: 2024-04-29

## 2024-04-26 RX ORDER — ADHESIVE BANDAGE
30 BANDAGE TOPICAL ONCE
Status: COMPLETED | OUTPATIENT
Start: 2024-04-26 | End: 2024-04-26

## 2024-04-26 RX ADMIN — POLYETHYLENE GLYCOL 3350 17 G: 17 POWDER, FOR SOLUTION ORAL at 11:04

## 2024-04-26 RX ADMIN — PRENATAL VITAMINS-IRON FUMARATE 27 MG IRON-FOLIC ACID 0.8 MG TABLET 1 TABLET: at 07:04

## 2024-04-26 RX ADMIN — DOCUSATE SODIUM 200 MG: 100 CAPSULE, LIQUID FILLED ORAL at 07:04

## 2024-04-26 RX ADMIN — FAMOTIDINE 20 MG: 20 TABLET, FILM COATED ORAL at 08:04

## 2024-04-26 RX ADMIN — NIFEDIPINE 30 MG: 30 TABLET, FILM COATED, EXTENDED RELEASE ORAL at 07:04

## 2024-04-26 RX ADMIN — SODIUM CHLORIDE, PRESERVATIVE FREE 10 ML: 5 INJECTION INTRAVENOUS at 10:04

## 2024-04-26 RX ADMIN — SODIUM CHLORIDE, PRESERVATIVE FREE 10 ML: 5 INJECTION INTRAVENOUS at 06:04

## 2024-04-26 RX ADMIN — ONDANSETRON 4 MG: 4 TABLET, ORALLY DISINTEGRATING ORAL at 07:04

## 2024-04-26 RX ADMIN — AMOXICILLIN 500 MG: 500 CAPSULE ORAL at 06:04

## 2024-04-26 RX ADMIN — MAGNESIUM HYDROXIDE 2400 MG: 400 SUSPENSION ORAL at 11:04

## 2024-04-26 RX ADMIN — PYRIDOXINE HCL TAB 50 MG 50 MG: 50 TAB at 08:04

## 2024-04-26 RX ADMIN — FAMOTIDINE 20 MG: 20 TABLET, FILM COATED ORAL at 09:04

## 2024-04-26 NOTE — PROGRESS NOTES
Intermittent Progress Note    23-year-old female  2 para 1 at 22 weeks admitted for premature rupture of membranes and twin pregnancy with twin A demise reports episodes of lower abdominal pelvic pain and spotting dark brown discharge earlier.  Patient also reported recently having an episode of redder spotting than before.  Maternal-Fetal medicine requested a speculum exam to evaluate for cervical dilatation and possible labor.    Sterile Speculum Exam:  Visualization of the vagina revealed no active bleeding.  The cervical os was noted to be closed with no apparent bleeding originating from the cervical os.  The external cervix was noted to be very friable and some bright red spotting noted on the surface of this area.  No apparent active bleeding was occurring at this time.    Impression:  Twin gestation with P prom.  Cervical spotting consistent with previous exam.  No new cervical changes.    Plan:  Continue present management.  Maternal-Fetal Medicine notified.

## 2024-04-26 NOTE — PROGRESS NOTES
Progress Note  Maternal Fetal Medicine          Subjective:      IUP at 22w3d - twin IUP at 22w0d (with known IUFD of Twin A since 16w EGA) who presented 24 for leaking/bleeding (oligohydramnios of twin B since early second trimester).     She had complaints of some cramping yesterday afternoon and had a bleeding episode at approx 1600 when ambulating to the bathroom. Bright red in color. Saturated approx 1/4 of her peripad. Speculum exam completed by Dr Rosas after this episode - no active bleeding coming from cervical os. Cervix visually closed. Cervix was noted to be very friable with spotting noted on the surface of the external os. Denies any further bleeding episodes since then. Reports intermittent leakage scant amt of clear vaginal fluid. Reports positive fetal movement.  Yesterday AM labs normal for pregnancy.  She has complaints of constipation. She attempted a Fleets enema which was minimally effective, per her report. Will add to her regimen today.    Nursing staff without concerns.     Objective:         Temp:  [97.7 °F (36.5 °C)-98.9 °F (37.2 °C)] 98.9 °F (37.2 °C)  Pulse:  [83-98] 98  Resp:  [16-18] 16  SpO2:  [95 %-99 %] 97 %  BP: (103-136)/(59-81) 107/66    Gen: NAD, A&Ox3  Pulm: Unlabored breathing, LCTAB  Card: RRR  Abd: FHT present, soft, nondistended, nontender to palpation, gravid uterus   Extremities: Palpable peripheral pulses, no pedal edema, 2+ DTRs x 4    FHTs confirmed q shift  Denies contractions  : Breech, FHTs+, Twin B EFW 479g, MVP 1.85cm, oligohydramnios.       Lab Review  Blood Type: O POS    No results found for this or any previous visit (from the past 24 hour(s)).      Assessment:       23 y.o.  at 22w3d weeks gestation admitted for twin gestation with PPROM of twin B, vaginal bleeding, IUFD of Twin A diagnosed at 16w, cHTN, elevated msAFP   Active Hospital Problems    Diagnosis  POA    Continuing pregnancy after intrauterine death of one fetus or more, second  trimester, fetus 1 [O31.22X1]  Yes     premature rupture of membranes (PPROM) with unknown onset of labor- Twin B [O42.919]  Yes    4. Chronic hypertension affecting pregnancy [O10.919]  Yes      Resolved Hospital Problems   No resolved problems to display.        Plan:     1. Twins - previable PPROM Twin B  Patient's evaluation and exam is consistent with previable PPROM (rupture of membranes prior to 22 weeks 6 days gestation). Patient was counseled regarding the extremely poor prognosis of ruptured membranes at this early gestational age. We reviewed the maternal risks of previable premature rupture of membranes which include but are not limited to chorioamnionitis, endometritis, sepsis, potential need for hysterectomy and loss of fertility, VTE, and maternal death. We reviewed the fetal risks which include stillbirth, cord prolapse, infection, pulmonary hypoplasia, risk for musculoskeletal and facial anomalies due to severe oligohydramnios, and  death. We reviewed routine pulmonary maturation and the function of amniotic fluid in the normal growth of fetal lungs. We reviewed the unlikely chance of membranes resealing from spontaneous rupture. I counseled the patient regarding management options which prior to 22 weeks (ie gestational age < 21 weeks 6 days) include conservative management, induction of labor, or dilation and evacuation. At a gestational age of 22 weeks 0 days and greater, conservative management is recommended at [facility].  Known IUFD of Twin A - diagnosed at 16w (fetus was measuring 13w6d at that time)     Recommendations:  - Latency antibiotics initiated.  - Confirm FHTs (Twin B) with doppler qshift. Continuous toco monitoring due to patient reporting intermittent cramping.  - Monitor for s/s infection, abruption. Currently afebrile. No evidence of leukocytosis- weekly CBC planned  - EFW q3 weeks (completed 24). No evidence of placenta previa on most recent ultrasound.  -  Once weekly AFV/presentation, if admitted.   - Prior to PPROM and this hospital admission, we had extensively discussed the high risk for fetal morbidity/mortality given Twin B's severe oligohydramnios since 16w and significantly elevated AFP. These risks are compounded by PPROM and we discussed this even further today utilizing translating services with her significant other present in the room. They were without questions and verbalized understanding.   - VTE prophylaxis (SCDs ordered)  - - I addressed the previable status and poor prognosis with the patient and she does not feel comfortable being at home given the IUFD of baby A and guarded prognosis for baby B. She will remain inpatient until delivery.  - Steroids and magnesium at 23 weeks. Considering intervention at 23 weeks.  - - PICC done  - Considered fetal echo since we have been unable to confidently identify chorionicity of twin gestation as well as diagnosis of IUFD of Twin A, however, we believe this attempt would be futile as imaging has always been extremely limited due to chronic, severe oligohydramnios in addition to high risk of fetal/ morbidity/mortality. Recommend  evaluation if delivers at a viable gestation and resuscitative attempts are successful.      2. cHTN  - Continue home med of Nifedipine 30mg XL daily.  - Monitor Bps as ordered.  - If she has a mild headache, may administer Tylenol PRN.     3. BMI 32.59  -Complicates all aspects of care     4. Constipation  - Colace 200mg daily  -Add one time order of Milk of Mag today  -Add daily Miralax to regimen     5. Nausea  - Consistent with pregnancy type symptoms.   - ordered Vitamin B6  - Zofran PRN     6. Abnormal OGTT  - Failed 1 hr ogtt on  (early)  - Passed early 3hr OGTT  - Plan will be repeat glucose test between 24-28 weeks (at least 1 week after steroids). OK to skip glucola and go to 3 HR if patient desires.       Thank you for allowing us to participate  in the care of your patient. If you have any questions/concerns, please do not hesitate to contact us.     Randi Cooper, MSN, APRN, WHNP-BC  Maternal Fetal Medicine

## 2024-04-26 NOTE — PROGRESS NOTES
Antepartum Progress Note        Subjective:      Lily Mckeon is a 23 y.o.  @22w3d who is admitted for management of PPROM  in the setting of twin gestation with fetal demise of Twin RAMON ISLAS. Reported episode of vaginal bleeding yesterday afternoon/evening. She reports small amount noticed while showering this morning. She continues to deny fever, abdominal pain, cramping, UCs. Nurse reports episode of nausea/dry heaving this morning and was given Zofran. Pt reports nausea is usually early morning prior to breakfast.  She denies daily bowel movement and is concerned with constipation. States given an enema on yesterday with minimal relief.   Objective:      Temp:  [98.2 °F (36.8 °C)-98.9 °F (37.2 °C)] 98.5 °F (36.9 °C)  Pulse:  [83-98] 89  Resp:  [16-18] 16  SpO2:  [95 %-99 %] 97 %  BP: (103-120)/(59-75) 120/60  Body mass index is 32.59 kg/m².     General: no acute distress, sitting bedside   Resp: No increased WOB  CV: regular rate  Abd: soft, nt, nd, gravid  MSK: R brachial PICC line   Ext: No erythema or calf tenderness  .   FHT: 135 bpm      Growth imaging Dr. Calvin  :   Twin B is breech.    bpm .   Posterior placenta    grams   MVP 2 cm      Assessment/Plan      IUP at  @ 22w3d admitted for management of PPROM .    Group & Rh   Date Value Ref Range Status   2024 O POS  Final     # PPROM   - Positive ROM +   - latency antibiotics :   Azithromycin 1 g on day 1 and day 5   S/p ampicillin IV 2 grams q 6 hours for 48 hours    D9  amoxicillin 500 mg TID ( day 3-10 )   - GC/ CT : negative   - GBS: negative   - wet prep: negative   - in house until delivery at 34 weeks    - BMZ and magnesium sulfate for Neuroprotection to be given at 23 weeks    - will continue to monitor vaginal bleeding. If patient complaints of abdominal pain, place on continuous tocometer      # Chronic HTN   - continue daily Procardia 30 XL   - ASA 81 daily   - will continue to monitor blood  pressures      # Mono-Di twins with retained fetal demise of Twin A   - FHT q shift   - PNV daily     #Glucose testing   - 1 hr GTT O'Luu 190  - 3 hr glucose normal   - will repeat Glucola at 24-28 weeks. Needs to be completed at least 7 days AFTER corticosteroids administration @ 23 weeks     #Anemia   - H/H 10.1/30.0   - will start PO iron     #Constipation  -Continue Colace 200mg BID  -Add Miralax 17g dose daily  -Psyllium husk daily prn  -Senna docusate nightly prn    #Nausea  -early mornings usually prior to eating breakfast  -Ondansetron 4mg ODT q8hr prn  -B6 50mg daily  -Famotidine 20mg bid          Donya [place], NP

## 2024-04-27 PROCEDURE — A4216 STERILE WATER/SALINE, 10 ML: HCPCS | Performed by: NURSE PRACTITIONER

## 2024-04-27 PROCEDURE — 25000003 PHARM REV CODE 250: Performed by: NURSE PRACTITIONER

## 2024-04-27 PROCEDURE — 25000003 PHARM REV CODE 250: Performed by: OBSTETRICS & GYNECOLOGY

## 2024-04-27 PROCEDURE — 11000001 HC ACUTE MED/SURG PRIVATE ROOM

## 2024-04-27 PROCEDURE — 25000003 PHARM REV CODE 250

## 2024-04-27 RX ADMIN — SODIUM CHLORIDE, PRESERVATIVE FREE 10 ML: 5 INJECTION INTRAVENOUS at 04:04

## 2024-04-27 RX ADMIN — PRENATAL VITAMINS-IRON FUMARATE 27 MG IRON-FOLIC ACID 0.8 MG TABLET 1 TABLET: at 08:04

## 2024-04-27 RX ADMIN — SODIUM CHLORIDE, PRESERVATIVE FREE 10 ML: 5 INJECTION INTRAVENOUS at 12:04

## 2024-04-27 RX ADMIN — SODIUM CHLORIDE, PRESERVATIVE FREE 10 ML: 5 INJECTION INTRAVENOUS at 11:04

## 2024-04-27 RX ADMIN — ONDANSETRON 4 MG: 4 TABLET, ORALLY DISINTEGRATING ORAL at 08:04

## 2024-04-27 RX ADMIN — NIFEDIPINE 30 MG: 30 TABLET, FILM COATED, EXTENDED RELEASE ORAL at 08:04

## 2024-04-27 RX ADMIN — ACETAMINOPHEN 650 MG: 325 TABLET, FILM COATED ORAL at 10:04

## 2024-04-27 RX ADMIN — PYRIDOXINE HCL TAB 50 MG 50 MG: 50 TAB at 04:04

## 2024-04-27 RX ADMIN — POLYETHYLENE GLYCOL 3350 17 G: 17 POWDER, FOR SOLUTION ORAL at 08:04

## 2024-04-27 RX ADMIN — SODIUM CHLORIDE, PRESERVATIVE FREE 10 ML: 5 INJECTION INTRAVENOUS at 05:04

## 2024-04-27 RX ADMIN — DOCUSATE SODIUM 200 MG: 100 CAPSULE, LIQUID FILLED ORAL at 08:04

## 2024-04-27 NOTE — PROGRESS NOTES
Antepartum Progress Note:     Subjective    NAEON. Still reports intermittent vaginal bleeding. Last occurred 5pm last night and midnight. Denies any cramping abdominal pain. Constipation now resolved with milk of magnesia; was able to have BM yesterday.     Denies contractions. Reports regular fetal movement.  Denies headache, vision changes, or RUQ pain.   Denies fevers, chills, nausea, vomiting, chest pain, or shortness of breath.   Denies lightheadedness, dizziness, or syncope.     Objective:  Vital signs in last 24 hours:     Vitals:    24 1924 24 2211 24 0255 24 0808   BP: 128/75 112/69 119/62 119/74   Pulse: 103 80 86 101   Resp: 16 14 16 16   Temp: 97.6 °F (36.4 °C) 97.5 °F (36.4 °C) 97.6 °F (36.4 °C) 98.6 °F (37 °C)   TempSrc:       SpO2:       Weight:       Height:             Physical Exam:  Gen: Alert and oriented x 3, cooperative, no distress  CV: RRR, no murmurs/rubs/gallops  Chest: CTABL, no wheezes/rales/rhonchi  Abdomen: Soft, gravid, non-tender  Extrem: calves nontender    Labs  No results found for this or any previous visit (from the past 24 hour(s)).    Medications     Current Facility-Administered Medications   Medication Dose Route Frequency    docusate sodium  200 mg Oral Daily    NIFEdipine  30 mg Oral Daily    polyethylene glycol  17 g Oral Daily    prenatal vitamin  1 tablet Oral Daily    pyridoxine (vitamin B6)  50 mg Oral Daily    sodium chloride 0.9%  10 mL Intravenous Q6H         Assessment/Plan  23 y.o.  at 22w4d admitted for management of PPROM    # PPROM   - Positive ROM +   - latency antibiotics :   Azithromycin 1 g on day 1 and day 5   S/p ampicillin IV 2 grams q 6 hours for 48 hours   D9 amoxicillin 500 mg TID ( day 3-10 )   - GC/ CT : negative   - GBS: negative   - wet prep: negative   - in house until delivery at 34 weeks    - BMZ and magnesium sulfate for Neuroprotection to be given at 23 weeks    - will continue to monitor vaginal bleeding. If  patient complaints of abdominal pain, place on continuous tocometer      # Chronic HTN   - continue daily Procardia 30 XL   - ASA 81 daily   - will continue to monitor blood pressures      # Mono-Di twins with retained fetal demise of Twin A   - FHT q shift   - PNV daily      #Glucose testing   - 1 hr GTT O'Luu 190  - 3 hr glucose normal   - will repeat Glucola at 24-28 weeks. Needs to be completed at least 7 days AFTER corticosteroids administration @ 23 weeks      #Anemia   - H/H 10.1/30.0   - will start PO iron      #Constipation  -Continue Colace 200mg BID  -Add Miralax 17g dose daily  -Psyllium husk daily prn  -Senna docusate nightly prn     #Nausea  -early mornings usually prior to eating breakfast  -Ondansetron 4mg ODT q8hr prn  -B6 50mg daily  -Famotidine 20mg bid      Will discuss with Dr. Diana Ledesma MD  Family Medicine, [facility]

## 2024-04-28 LAB
BASOPHILS # BLD AUTO: 0.03 X10(3)/MCL
BASOPHILS NFR BLD AUTO: 0.4 %
EOSINOPHIL # BLD AUTO: 0.19 X10(3)/MCL (ref 0–0.9)
EOSINOPHIL NFR BLD AUTO: 2.4 %
ERYTHROCYTE [DISTWIDTH] IN BLOOD BY AUTOMATED COUNT: 12.6 % (ref 11.5–17)
GROUP & RH: NORMAL
HCT VFR BLD AUTO: 28.3 % (ref 37–47)
HGB BLD-MCNC: 9.7 G/DL (ref 12–16)
IMM GRANULOCYTES # BLD AUTO: 0.03 X10(3)/MCL (ref 0–0.04)
IMM GRANULOCYTES NFR BLD AUTO: 0.4 %
INDIRECT COOMBS: NORMAL
LYMPHOCYTES # BLD AUTO: 2.09 X10(3)/MCL (ref 0.6–4.6)
LYMPHOCYTES NFR BLD AUTO: 26.5 %
MCH RBC QN AUTO: 29.8 PG (ref 27–31)
MCHC RBC AUTO-ENTMCNC: 34.3 G/DL (ref 33–36)
MCV RBC AUTO: 87.1 FL (ref 80–94)
MONOCYTES # BLD AUTO: 0.5 X10(3)/MCL (ref 0.1–1.3)
MONOCYTES NFR BLD AUTO: 6.3 %
NEUTROPHILS # BLD AUTO: 5.04 X10(3)/MCL (ref 2.1–9.2)
NEUTROPHILS NFR BLD AUTO: 64 %
NRBC BLD AUTO-RTO: 0 %
PLATELET # BLD AUTO: 289 X10(3)/MCL (ref 130–400)
PMV BLD AUTO: 8.8 FL (ref 7.4–10.4)
RBC # BLD AUTO: 3.25 X10(6)/MCL (ref 4.2–5.4)
SPECIMEN OUTDATE: NORMAL
WBC # SPEC AUTO: 7.88 X10(3)/MCL (ref 4.5–11.5)

## 2024-04-28 PROCEDURE — 25000003 PHARM REV CODE 250

## 2024-04-28 PROCEDURE — 86850 RBC ANTIBODY SCREEN: CPT

## 2024-04-28 PROCEDURE — 11000001 HC ACUTE MED/SURG PRIVATE ROOM

## 2024-04-28 PROCEDURE — 25000003 PHARM REV CODE 250: Performed by: OBSTETRICS & GYNECOLOGY

## 2024-04-28 PROCEDURE — 85025 COMPLETE CBC W/AUTO DIFF WBC: CPT

## 2024-04-28 PROCEDURE — 25000003 PHARM REV CODE 250: Performed by: NURSE PRACTITIONER

## 2024-04-28 PROCEDURE — A4216 STERILE WATER/SALINE, 10 ML: HCPCS | Performed by: NURSE PRACTITIONER

## 2024-04-28 RX ADMIN — PRENATAL VITAMINS-IRON FUMARATE 27 MG IRON-FOLIC ACID 0.8 MG TABLET 1 TABLET: at 09:04

## 2024-04-28 RX ADMIN — PYRIDOXINE HCL TAB 50 MG 50 MG: 50 TAB at 09:04

## 2024-04-28 RX ADMIN — ACETAMINOPHEN 650 MG: 325 TABLET, FILM COATED ORAL at 04:04

## 2024-04-28 RX ADMIN — POLYETHYLENE GLYCOL 3350 17 G: 17 POWDER, FOR SOLUTION ORAL at 09:04

## 2024-04-28 RX ADMIN — SODIUM CHLORIDE, PRESERVATIVE FREE 10 ML: 5 INJECTION INTRAVENOUS at 09:04

## 2024-04-28 RX ADMIN — DOCUSATE SODIUM 200 MG: 100 CAPSULE, LIQUID FILLED ORAL at 09:04

## 2024-04-28 RX ADMIN — SODIUM CHLORIDE, PRESERVATIVE FREE 10 ML: 5 INJECTION INTRAVENOUS at 02:04

## 2024-04-28 RX ADMIN — NIFEDIPINE 30 MG: 30 TABLET, FILM COATED, EXTENDED RELEASE ORAL at 09:04

## 2024-04-28 RX ADMIN — SODIUM CHLORIDE, PRESERVATIVE FREE 10 ML: 5 INJECTION INTRAVENOUS at 04:04

## 2024-04-28 NOTE — PROGRESS NOTES
Antepartum Progress Note:     Subjective    Patient reported mild right sided headache overnight but has since resolved. Still taking zofran for nausea. Denies any vaginal bleeding last night. Tolerating diet and having bowel movements.     Denies vaginal bleeding, contractions, or LOF. Reports regular fetal movement.  Denies vision changes, or RUQ pain.   Denies fevers, chills, chest pain, or shortness of breath.   Denies lightheadedness, dizziness, or syncope.     Objective:  Vital signs in last 24 hours:     Vitals:    04/27/24 1156 04/27/24 2004 04/27/24 2316 04/28/24 0446   BP: 118/79 117/72 110/61 (!) 107/57   Pulse: 86 85 80 88   Resp: 16 16 18 16   Temp: 98.6 °F (37 °C) 98.9 °F (37.2 °C) 98.1 °F (36.7 °C) 98.7 °F (37.1 °C)   TempSrc:       SpO2:       Weight:       Height:             Physical Exam:  Gen: Alert and oriented x 3, cooperative, no distress  CV: RRR, no murmurs/rubs/gallops  Chest: CTABL, no wheezes/rales/rhonchi  Abdomen: Soft, gravid, non-tender  Extrem: calves nontender    Labs  Recent Results (from the past 24 hour(s))   Type & Screen    Collection Time: 04/28/24  6:32 AM   Result Value Ref Range    Group & Rh O POS     Indirect Martir GEL NEG     Specimen Outdate 05/01/2024 23:59    CBC with Differential    Collection Time: 04/28/24  6:32 AM   Result Value Ref Range    WBC 7.88 4.50 - 11.50 x10(3)/mcL    RBC 3.25 (L) 4.20 - 5.40 x10(6)/mcL    Hgb 9.7 (L) 12.0 - 16.0 g/dL    Hct 28.3 (L) 37.0 - 47.0 %    MCV 87.1 80.0 - 94.0 fL    MCH 29.8 27.0 - 31.0 pg    MCHC 34.3 33.0 - 36.0 g/dL    RDW 12.6 11.5 - 17.0 %    Platelet 289 130 - 400 x10(3)/mcL    MPV 8.8 7.4 - 10.4 fL    Neut % 64.0 %    Lymph % 26.5 %    Mono % 6.3 %    Eos % 2.4 %    Basophil % 0.4 %    Lymph # 2.09 0.6 - 4.6 x10(3)/mcL    Neut # 5.04 2.1 - 9.2 x10(3)/mcL    Mono # 0.50 0.1 - 1.3 x10(3)/mcL    Eos # 0.19 0 - 0.9 x10(3)/mcL    Baso # 0.03 <=0.2 x10(3)/mcL    IG# 0.03 0 - 0.04 x10(3)/mcL    IG% 0.4 %    NRBC% 0.0 %        Medications     Current Facility-Administered Medications   Medication Dose Route Frequency    docusate sodium  200 mg Oral Daily    NIFEdipine  30 mg Oral Daily    polyethylene glycol  17 g Oral Daily    prenatal vitamin  1 tablet Oral Daily    pyridoxine (vitamin B6)  50 mg Oral Daily    sodium chloride 0.9%  10 mL Intravenous Q6H        Current Facility-Administered Medications   Medication Dose Route Frequency Last Rate Last Admin        Current Facility-Administered Medications:     acetaminophen, 650 mg, Oral, Q6H PRN    diphenhydrAMINE, 25 mg, Oral, Q4H PRN    diphenhydrAMINE, 25 mg, Intravenous, Q4H PRN    ondansetron, 4 mg, Oral, Q8H PRN    psyllium husk (with sugar), 1 packet, Oral, Daily PRN    senna-docusate 8.6-50 mg, 1 tablet, Oral, Nightly PRN    simethicone, 1 tablet, Oral, Q6H PRN    sodium chloride 0.9%, 10 mL, Intravenous, PRN    Flushing PICC/Midline Protocol, , , Until Discontinued **AND** sodium chloride 0.9%, 10 mL, Intravenous, Q6H **AND** sodium chloride 0.9%, 10 mL, Intravenous, PRN    Assessment/Plan  23 y.o.  at 22w5d admitted for management of PPROM    # PPROM   - Positive ROM +   - latency antibiotics :   Azithromycin 1 g on day 1 and day 5   S/p ampicillin IV 2 grams q 6 hours for 48 hours   D9 amoxicillin 500 mg TID ( day 3-10 )   - GC/ CT : negative   - GBS: negative   - wet prep: negative   - in house until delivery at 34 weeks    - BMZ and magnesium sulfate for Neuroprotection to be given at 23 weeks    - will continue to monitor vaginal bleeding. If patient complaints of abdominal pain, place on continuous tocometer      # Chronic HTN   - continue daily Procardia 30 XL   - ASA 81 daily   - will continue to monitor blood pressures      # Mono-Di twins with retained fetal demise of Twin A   - FHT q shift   - PNV daily      #Glucose testing   - 1 hr GTT O'Luu 190  - 3 hr glucose normal   - will repeat Glucola at 24-28 weeks. Needs to be completed at least 7 days  AFTER corticosteroids administration @ 23 weeks      #Anemia   - H/H 10.1/30.0   - will start PO iron      #Constipation  -Continue Colace 200mg BID  -Add Miralax 17g dose daily  -Psyllium husk daily prn  -Senna docusate nightly prn     #Nausea  -early mornings usually prior to eating breakfast  -Ondansetron 4mg ODT q8hr prn  -B6 50mg daily  -Famotidine 20mg bid    Will discuss with Dr. Rahel Ledesma MD  Family Medicine, [facility]

## 2024-04-29 PROCEDURE — 25000003 PHARM REV CODE 250: Performed by: OBSTETRICS & GYNECOLOGY

## 2024-04-29 PROCEDURE — 25000003 PHARM REV CODE 250: Performed by: NURSE PRACTITIONER

## 2024-04-29 PROCEDURE — 99233 SBSQ HOSP IP/OBS HIGH 50: CPT | Mod: ,,, | Performed by: OBSTETRICS & GYNECOLOGY

## 2024-04-29 PROCEDURE — 25000003 PHARM REV CODE 250

## 2024-04-29 PROCEDURE — A4216 STERILE WATER/SALINE, 10 ML: HCPCS | Performed by: NURSE PRACTITIONER

## 2024-04-29 PROCEDURE — 11000001 HC ACUTE MED/SURG PRIVATE ROOM

## 2024-04-29 RX ORDER — POLYETHYLENE GLYCOL 3350 17 G/17G
17 POWDER, FOR SOLUTION ORAL 2 TIMES DAILY
Status: DISCONTINUED | OUTPATIENT
Start: 2024-04-29 | End: 2024-05-11

## 2024-04-29 RX ORDER — BETAMETHASONE SODIUM PHOSPHATE AND BETAMETHASONE ACETATE 3; 3 MG/ML; MG/ML
12 INJECTION, SUSPENSION INTRA-ARTICULAR; INTRALESIONAL; INTRAMUSCULAR; SOFT TISSUE
Status: COMPLETED | OUTPATIENT
Start: 2024-04-30 | End: 2024-05-01

## 2024-04-29 RX ORDER — LANOLIN ALCOHOL/MO/W.PET/CERES
1 CREAM (GRAM) TOPICAL DAILY
Status: DISCONTINUED | OUTPATIENT
Start: 2024-04-30 | End: 2024-05-15 | Stop reason: HOSPADM

## 2024-04-29 RX ADMIN — NIFEDIPINE 30 MG: 30 TABLET, FILM COATED, EXTENDED RELEASE ORAL at 09:04

## 2024-04-29 RX ADMIN — SODIUM CHLORIDE, PRESERVATIVE FREE 10 ML: 5 INJECTION INTRAVENOUS at 06:04

## 2024-04-29 RX ADMIN — PRENATAL VITAMINS-IRON FUMARATE 27 MG IRON-FOLIC ACID 0.8 MG TABLET 1 TABLET: at 09:04

## 2024-04-29 RX ADMIN — SODIUM CHLORIDE, PRESERVATIVE FREE 10 ML: 5 INJECTION INTRAVENOUS at 12:04

## 2024-04-29 RX ADMIN — DOCUSATE SODIUM 200 MG: 100 CAPSULE, LIQUID FILLED ORAL at 09:04

## 2024-04-29 RX ADMIN — SODIUM CHLORIDE, PRESERVATIVE FREE 10 ML: 5 INJECTION INTRAVENOUS at 05:04

## 2024-04-29 RX ADMIN — PYRIDOXINE HCL TAB 50 MG 50 MG: 50 TAB at 09:04

## 2024-04-29 RX ADMIN — POLYETHYLENE GLYCOL 3350 17 G: 17 POWDER, FOR SOLUTION ORAL at 09:04

## 2024-04-29 RX ADMIN — ONDANSETRON 4 MG: 4 TABLET, ORALLY DISINTEGRATING ORAL at 07:04

## 2024-04-29 NOTE — PROGRESS NOTES
Inpatient Nutrition Evaluation    Admit Date: 4/16/2024   Total duration of encounter: 13 days   Patient Age: 23 y.o.    Nutrition Recommendation/Prescription     Continue regular diet as tolerated  Continue antiemetic and bowel regimen  Monitor labs, intake and weight    Nutrition Assessment     Chart Review    Reason Seen: length of stay and follow-up    Malnutrition Screening Tool Results   Have you recently lost weight without trying?: No  Have you been eating poorly because of a decreased appetite?: No   MST Score: 0   Diagnosis:  PPROM in the setting of twin gestation with fetal demise of Twin A  Anemia   Abnormal glucose testing    Relevant Medical History: HTN    Scheduled Medications:  docusate sodium, 200 mg, Daily  NIFEdipine, 30 mg, Daily  polyethylene glycol, 17 g, Daily  prenatal vitamin, 1 tablet, Daily  pyridoxine (vitamin B6), 50 mg, Daily  sodium chloride 0.9%, 10 mL, Q6H    Continuous Infusions:   PRN Medications:   Current Facility-Administered Medications:     acetaminophen, 650 mg, Oral, Q6H PRN    diphenhydrAMINE, 25 mg, Oral, Q4H PRN    diphenhydrAMINE, 25 mg, Intravenous, Q4H PRN    ondansetron, 4 mg, Oral, Q8H PRN    psyllium husk (with sugar), 1 packet, Oral, Daily PRN    senna-docusate 8.6-50 mg, 1 tablet, Oral, Nightly PRN    simethicone, 1 tablet, Oral, Q6H PRN    sodium chloride 0.9%, 10 mL, Intravenous, PRN    Flushing PICC/Midline Protocol, , , Until Discontinued **AND** sodium chloride 0.9%, 10 mL, Intravenous, Q6H **AND** sodium chloride 0.9%, 10 mL, Intravenous, PRN    Recent Labs   Lab 04/25/24  0628 04/28/24  0632   WBC 8.74 7.88   HGB 10.1* 9.7*   HCT 30.0* 28.3*     Nutrition Orders:  Diet Adult Regular      Appetite/Oral Intake: good/% of meals  Factors Affecting Nutritional Intake: constipation and nausea  Food/Holiness/Cultural Preferences: unable to obtain  Food Allergies:  pineapple  Last Bowel Movement: 04/21/24  Wound(s):      Comments    4/22: Pt NPO this AM for  "3 hr glucose test, diet advanced to regular this afternoon. Per notes, no N/V or abdominal pain, constipation resolved. No reports of decreased appetite. Per MST, no reports of decreased appetite or unintentional weight loss PTA. Weight appears stable PTA per EMR weight history. Recent 4# weight loss likely fluid loss.    4/29: Pt unable to answer questions due to language barrier. Nsg noted appetite good. Still with nausea in the mornings but zofran is helping. Bowel regimen is helping with constipation.     Anthropometrics    Height: 5' 3" (160 cm), Height Method: Stated  Last Weight: 83.5 kg (184 lb) (04/16/24 0900), Weight Method: Standard Scale  BMI (Calculated): 32.6  BMI Classification: not applicable-pregnancy     Ideal Body Weight (IBW), Female: 115 lb     % Ideal Body Weight, Female (lb): 160 %                             Usual Weight Provided By: EMR weight history    Wt Readings from Last 5 Encounters:   04/16/24 83.5 kg (184 lb)   04/09/24 85.3 kg (188 lb)   04/08/24 85.3 kg (188 lb 0.8 oz)   03/26/24 84.5 kg (186 lb 6.4 oz)   03/19/24 84.9 kg (187 lb 4.5 oz)     Weight Change(s) Since Admission: no new wt  Wt Readings from Last 1 Encounters:   04/16/24 0900 83.5 kg (184 lb)   Admit Weight: 83.5 kg (184 lb) (04/16/24 0900), Weight Method: Standard Scale    Patient Education     Not applicable.    Nutrition Goals & Monitoring     Dietitian will monitor: food and beverage intake, weight, and gastrointestinal profile    Nutrition Risk/Follow-Up: low (follow-up in 5-7 days)  Patients assigned 'low nutrition risk' status do not qualify for a full nutritional assessment but will be monitored and re-evaluated in a 5-7 day time period. Please consult if re-evaluation needed sooner.    "

## 2024-04-29 NOTE — PROGRESS NOTES
Antepartum Progress Note        Subjective:      Lily Mckeon is a 23 y.o.  @22w6d who is admitted for management of PPROM in the setting of twin gestation with fetal demise of Twin A.    ROSHAN. Had minimal fluid leakage overnight but no vaginal bleeding. Denies abdominal pain or contractions.    Objective:      Temp:  [97 °F (36.1 °C)-99 °F (37.2 °C)] 99 °F (37.2 °C)  Pulse:  [86-99] 87  Resp:  [14-16] 16  SpO2:  [98 %] 98 %  BP: (115-139)/(69-81) 125/71  Body mass index is 32.59 kg/m².     General: no acute distress, sitting bedside   Resp: No increased WOB  CV: regular rate  Abd: soft, nt, nd, gravid  MSK: R brachial PICC line   Ext: No erythema or calf tenderness  .   FHT: 130s today     Growth imaging Dr. Calvin  :   Twin B is breech.    bpm .   Posterior placenta    grams   MVP 2 cm      Assessment/Plan      IUP at  @ 22w6d admitted for management of PPROM .    Group & Rh   Date Value Ref Range Status   2024 O POS  Final     # PPROM  - Positive ROM +   - latency antibiotics :   S/p azithromycin 1 g on day 1 and day 5   S/p ampicillin IV 2 grams q 6 hours for 48 hours   S/p amoxicillin 500 mg TID  - GC/ CT : negative   - GBS: negative   - wet prep: negative   - in house until delivery at 34 weeks    - BMZ and magnesium sulfate for Neuroprotection to be given at 23 weeks    - will continue to monitor vaginal bleeding. If patient complaints of abdominal pain, place on continuous tocometer      # Chronic HTN   - continue daily Procardia 30 XL   - ASA 81 daily   - will continue to monitor blood pressures      # Mono-Di twins with retained fetal demise of Twin A   - FHT q shift   - PNV daily     #Glucose testing   - 1 hr GTT O'Luu 190  - 3 hr glucose normal   - will repeat Glucola at 24-28 weeks. Needs to be completed at least 7 days AFTER corticosteroids administration @ 23 weeks     #Anemia   - H/H 9.7/28.3 on .  - PO iron     Discussed with staff.     [place]icho  HARDY Teixeira MD  [facility], PGY-2

## 2024-04-29 NOTE — PROGRESS NOTES
Progress Note  Maternal Fetal Medicine          Subjective:      Twin IUP at 22w6d  (with known IUFD of Twin A since 16w EGA) who presented 24 for leaking/bleeding (oligohydramnios of twin B since early second trimester).     No changes overnight in status. No bleeding this weekend or cramping. Still has constipation. +leaking.    Objective:         Temp:  [97 °F (36.1 °C)-99 °F (37.2 °C)] 99 °F (37.2 °C)  Pulse:  [] 87  Resp:  [14-16] 16  SpO2:  [98 %] 98 %  BP: (115-139)/(68-81) 125/71    Gen: NAD, A&Ox3  Pulm: Unlabored breathing, LCTAB  Card: RRR  Abd: FHT present, soft, nondistended, nontender to palpation, gravid uterus   Extremities: Palpable peripheral pulses, no pedal edema, 2+ DTRs x 4    FHTs confirmed q shift  Denies contractions  : Breech, FHTs+, Twin B EFW 479g, MVP 1.85cm, oligohydramnios.       Lab Review  Blood Type: O POS    No results found for this or any previous visit (from the past 24 hour(s)).      Assessment:       23 y.o.  at 22w6d weeks gestation admitted for twin gestation with PPROM of twin B, vaginal bleeding, IUFD of Twin A diagnosed at 16w, cHTN, elevated msAFP   Active Hospital Problems    Diagnosis  POA    Continuing pregnancy after intrauterine death of one fetus or more, second trimester, fetus 1 [O31.22X1]  Yes     premature rupture of membranes (PPROM) with unknown onset of labor- Twin B [O42.919]  Yes    4. Chronic hypertension affecting pregnancy [O10.919]  Yes      Resolved Hospital Problems   No resolved problems to display.        Plan:     1. Twins - previable PPROM Twin B  Patient's evaluation and exam is consistent with previable PPROM (rupture of membranes prior to 22 weeks 6 days gestation). Patient was counseled regarding the extremely poor prognosis of ruptured membranes at this early gestational age. We reviewed the maternal risks of previable premature rupture of membranes which include but are not limited to chorioamnionitis,  endometritis, sepsis, potential need for hysterectomy and loss of fertility, VTE, and maternal death. We reviewed the fetal risks which include stillbirth, cord prolapse, infection, pulmonary hypoplasia, risk for musculoskeletal and facial anomalies due to severe oligohydramnios, and  death. We reviewed routine pulmonary maturation and the function of amniotic fluid in the normal growth of fetal lungs. We reviewed the unlikely chance of membranes resealing from spontaneous rupture. I counseled the patient regarding management options which prior to 22 weeks (ie gestational age < 21 weeks 6 days) include conservative management, induction of labor, or dilation and evacuation. At a gestational age of 22 weeks 0 days and greater, conservative management is recommended at [facility].  Known IUFD of Twin A - diagnosed at 16w (fetus was measuring 13w6d at that time)     Recommendations:  - Latency antibiotics initiated.  - Confirm FHTs (Twin B) with doppler qshift. Continuous toco monitoring due to patient reporting intermittent cramping.  - Monitor for s/s infection, abruption. Currently afebrile. No evidence of leukocytosis- weekly CBC planned  - EFW q3 weeks (completed 24). No evidence of placenta previa on most recent ultrasound.  - Once weekly AFV/presentation, if admitted.   - Prior to PPROM and this hospital admission, we had extensively discussed the high risk for fetal morbidity/mortality given Twin B's severe oligohydramnios since 16w and significantly elevated AFP. These risks are compounded by PPROM and we discussed this even further today utilizing translating services with her significant other present in the room. They were without questions and verbalized understanding.   - VTE prophylaxis (SCDs ordered)  - - I addressed the previable status and poor prognosis with the patient and she does not feel comfortable being at home given the IUFD of baby A and guarded prognosis for baby B. She will  remain inpatient until delivery.  - Steroids and magnesium at 23 weeks. Considering intervention at 23 weeks.- will order tomorrow  - - PICC done  - Considered fetal echo since we have been unable to confidently identify chorionicity of twin gestation as well as diagnosis of IUFD of Twin A, however, we believe this attempt would be futile as imaging has always been extremely limited due to chronic, severe oligohydramnios in addition to high risk of fetal/ morbidity/mortality. Recommend  evaluation if delivers at a viable gestation and resuscitative attempts are successful.      2. cHTN  - Continue home med of Nifedipine 30mg XL daily.  - Monitor Bps as ordered.  - If she has a mild headache, may administer Tylenol PRN.     3. BMI 32.59  -Complicates all aspects of care     4. Constipation  - Colace 200mg daily  -Add one time order of Milk of Mag today  -Miralax BID     5. Nausea  - Consistent with pregnancy type symptoms.   - ordered Vitamin B6  - Zofran PRN     6. Abnormal OGTT  - Failed 1 hr ogtt on  (early)  - Passed early 3hr OGTT  - Plan will be repeat glucose test between 24-28 weeks (at least 1 week after steroids). OK to skip glucola and go to 3 HR if patient desires.       Thank you for allowing us to participate in the care of your patient. If you have any questions/concerns, please do not hesitate to contact us.     Michelle Calvin MD  Maternal Fetal Medicine

## 2024-04-30 PROCEDURE — 63600175 PHARM REV CODE 636 W HCPCS: Performed by: STUDENT IN AN ORGANIZED HEALTH CARE EDUCATION/TRAINING PROGRAM

## 2024-04-30 PROCEDURE — 99233 SBSQ HOSP IP/OBS HIGH 50: CPT | Mod: ,,, | Performed by: NURSE PRACTITIONER

## 2024-04-30 PROCEDURE — A4216 STERILE WATER/SALINE, 10 ML: HCPCS | Performed by: NURSE PRACTITIONER

## 2024-04-30 PROCEDURE — 25000003 PHARM REV CODE 250: Performed by: NURSE PRACTITIONER

## 2024-04-30 PROCEDURE — 25000003 PHARM REV CODE 250: Performed by: OBSTETRICS & GYNECOLOGY

## 2024-04-30 PROCEDURE — 25000003 PHARM REV CODE 250: Performed by: STUDENT IN AN ORGANIZED HEALTH CARE EDUCATION/TRAINING PROGRAM

## 2024-04-30 PROCEDURE — 11000001 HC ACUTE MED/SURG PRIVATE ROOM

## 2024-04-30 PROCEDURE — 63600175 PHARM REV CODE 636 W HCPCS: Performed by: NURSE PRACTITIONER

## 2024-04-30 PROCEDURE — 25000003 PHARM REV CODE 250

## 2024-04-30 RX ORDER — CALCIUM GLUCONATE 98 MG/ML
1 INJECTION, SOLUTION INTRAVENOUS
Status: DISCONTINUED | OUTPATIENT
Start: 2024-04-30 | End: 2024-05-11

## 2024-04-30 RX ORDER — MAGNESIUM SULFATE HEPTAHYDRATE 40 MG/ML
6 INJECTION, SOLUTION INTRAVENOUS ONCE
Status: COMPLETED | OUTPATIENT
Start: 2024-04-30 | End: 2024-04-30

## 2024-04-30 RX ORDER — HYDRALAZINE HYDROCHLORIDE 20 MG/ML
10 INJECTION INTRAMUSCULAR; INTRAVENOUS ONCE AS NEEDED
Status: DISCONTINUED | OUTPATIENT
Start: 2024-04-30 | End: 2024-04-30

## 2024-04-30 RX ORDER — LABETALOL HCL 20 MG/4 ML
40 SYRINGE (ML) INTRAVENOUS ONCE AS NEEDED
Status: DISCONTINUED | OUTPATIENT
Start: 2024-04-30 | End: 2024-04-30

## 2024-04-30 RX ORDER — LABETALOL HCL 20 MG/4 ML
20 SYRINGE (ML) INTRAVENOUS ONCE AS NEEDED
Status: DISCONTINUED | OUTPATIENT
Start: 2024-04-30 | End: 2024-04-30

## 2024-04-30 RX ORDER — LABETALOL HCL 20 MG/4 ML
80 SYRINGE (ML) INTRAVENOUS ONCE AS NEEDED
Status: DISCONTINUED | OUTPATIENT
Start: 2024-04-30 | End: 2024-04-30

## 2024-04-30 RX ORDER — MAGNESIUM SULFATE HEPTAHYDRATE 40 MG/ML
2 INJECTION, SOLUTION INTRAVENOUS CONTINUOUS
Status: DISPENSED | OUTPATIENT
Start: 2024-04-30 | End: 2024-04-30

## 2024-04-30 RX ORDER — SODIUM CHLORIDE, SODIUM LACTATE, POTASSIUM CHLORIDE, CALCIUM CHLORIDE 600; 310; 30; 20 MG/100ML; MG/100ML; MG/100ML; MG/100ML
1000 INJECTION, SOLUTION INTRAVENOUS CONTINUOUS
Status: ACTIVE | OUTPATIENT
Start: 2024-04-30 | End: 2024-05-01

## 2024-04-30 RX ADMIN — MAGNESIUM SULFATE HEPTAHYDRATE 6 G: 40 INJECTION, SOLUTION INTRAVENOUS at 10:04

## 2024-04-30 RX ADMIN — SODIUM CHLORIDE, PRESERVATIVE FREE 10 ML: 5 INJECTION INTRAVENOUS at 06:04

## 2024-04-30 RX ADMIN — SODIUM CHLORIDE, PRESERVATIVE FREE 10 ML: 5 INJECTION INTRAVENOUS at 12:04

## 2024-04-30 RX ADMIN — PYRIDOXINE HCL TAB 50 MG 50 MG: 50 TAB at 09:04

## 2024-04-30 RX ADMIN — SODIUM CHLORIDE, PRESERVATIVE FREE 10 ML: 5 INJECTION INTRAVENOUS at 05:04

## 2024-04-30 RX ADMIN — POLYETHYLENE GLYCOL 3350 17 G: 17 POWDER, FOR SOLUTION ORAL at 09:04

## 2024-04-30 RX ADMIN — FERROUS SULFATE TAB 325 MG (65 MG ELEMENTAL FE) 1 EACH: 325 (65 FE) TAB at 09:04

## 2024-04-30 RX ADMIN — DOCUSATE SODIUM 200 MG: 100 CAPSULE, LIQUID FILLED ORAL at 09:04

## 2024-04-30 RX ADMIN — PRENATAL VITAMINS-IRON FUMARATE 27 MG IRON-FOLIC ACID 0.8 MG TABLET 1 TABLET: at 09:04

## 2024-04-30 RX ADMIN — NIFEDIPINE 30 MG: 30 TABLET, FILM COATED, EXTENDED RELEASE ORAL at 09:04

## 2024-04-30 RX ADMIN — SODIUM CHLORIDE, POTASSIUM CHLORIDE, SODIUM LACTATE AND CALCIUM CHLORIDE 1000 ML: 600; 310; 30; 20 INJECTION, SOLUTION INTRAVENOUS at 10:04

## 2024-04-30 RX ADMIN — BETAMETHASONE ACETATE AND BETAMETHASONE SODIUM PHOSPHATE 12 MG: 3; 3 INJECTION, SUSPENSION INTRA-ARTICULAR; INTRALESIONAL; INTRAMUSCULAR; SOFT TISSUE at 06:04

## 2024-04-30 RX ADMIN — MAGNESIUM SULFATE HEPTAHYDRATE 2 G/HR: 40 INJECTION, SOLUTION INTRAVENOUS at 10:04

## 2024-04-30 RX ADMIN — ONDANSETRON 4 MG: 4 TABLET, ORALLY DISINTEGRATING ORAL at 08:04

## 2024-04-30 NOTE — PROGRESS NOTES
Progress Note  Maternal Fetal Medicine          Subjective:      Twin IUP at 23w0d  (with known IUFD of Twin A since 16w EGA) who presented 24 for leaking/bleeding (oligohydramnios of twin B since early second trimester).     No changes overnight in status. Denies vaginal bleeding/leaking overnight. Reports she had a bowel movement. Denies foul vaginal odor, malaise, chills, aches. Afebrile. Vital signs stable.  Now that she is 23 weeks, we will initiate neuromagnesium, ANCS, and fetal monitoring. She understands the plan of care.     Nursing staff without concerns.    Objective:         Temp:  [98.1 °F (36.7 °C)-99 °F (37.2 °C)] 98.9 °F (37.2 °C)  Pulse:  [81-98] 93  Resp:  [16-18] 16  SpO2:  [99 %] 99 %  BP: (104-125)/(65-74) 124/74    Gen: NAD, A&Ox3  Pulm: Unlabored breathing, LCTAB  Card: RRR  Abd: FHT present, soft, nondistended, nontender to palpation, gravid uterus   Extremities: Palpable peripheral pulses, no pedal edema, 2+ DTRs x 4    FHTs confirmed q shift  Denies contractions  : Breech, FHTs+, Twin B EFW 479g, MVP 1.85cm, oligohydramnios.   Limited OB ultrasound due today, 24.    Lab Review  Blood Type: O POS    No results found for this or any previous visit (from the past 24 hour(s)).      Assessment:       23 y.o.  at 23w0d weeks gestation admitted for twin gestation with PPROM of twin B, vaginal bleeding, IUFD of Twin A diagnosed at 16w, cHTN, elevated msAFP   Active Hospital Problems    Diagnosis  POA    Continuing pregnancy after intrauterine death of one fetus or more, second trimester, fetus 1 [O31.22X1]  Yes     premature rupture of membranes (PPROM) with unknown onset of labor- Twin B [O42.919]  Yes    4. Chronic hypertension affecting pregnancy [O10.919]  Yes      Resolved Hospital Problems   No resolved problems to display.        Plan:     1. Twins - previable PPROM Twin B  Patient's evaluation and exam is consistent with previable PPROM (rupture of membranes  prior to 22 weeks 6 days gestation). Patient was counseled regarding the extremely poor prognosis of ruptured membranes at this early gestational age. We reviewed the maternal risks of previable premature rupture of membranes which include but are not limited to chorioamnionitis, endometritis, sepsis, potential need for hysterectomy and loss of fertility, VTE, and maternal death. We reviewed the fetal risks which include stillbirth, cord prolapse, infection, pulmonary hypoplasia, risk for musculoskeletal and facial anomalies due to severe oligohydramnios, and  death. We reviewed routine pulmonary maturation and the function of amniotic fluid in the normal growth of fetal lungs. We reviewed the unlikely chance of membranes resealing from spontaneous rupture. I counseled the patient regarding management options which prior to 22 weeks (ie gestational age < 21 weeks 6 days) include conservative management, induction of labor, or dilation and evacuation. At a gestational age of 22 weeks 0 days and greater, conservative management is recommended at [facility].  Known IUFD of Twin A - diagnosed at 16w (fetus was measuring 13w6d at that time)     Recommendations:  - Latency antibiotics initiated..  - Monitor for s/s infection, abruption. Currently afebrile. No evidence of leukocytosis- weekly CBC planned  - EFW q3 weeks (completed 24). No evidence of placenta previa on most recent ultrasound.  - Once weekly AFV/presentation, (). Ordered 24  - Prior to PPROM and this hospital admission, we had extensively discussed the high risk for fetal morbidity/mortality given Twin B's severe oligohydramnios since 16w and significantly elevated AFP. These risks are compounded by PPROM and we discussed this even further today utilizing translating services with her significant other present in the room. They were without questions and verbalized understanding.   - VTE prophylaxis (SCDs ordered) - compliance  encouraged  - - I addressed the previable status and poor prognosis with the patient and she does not feel comfortable being at home given the IUFD of baby A and guarded prognosis for baby B. She will remain inpatient until delivery.  - - PICC done  - Considered fetal echo since we have been unable to confidently identify chorionicity of twin gestation as well as diagnosis of IUFD of Twin A, however, we believe this attempt would be futile as imaging has always been extremely limited due to chronic, severe oligohydramnios in addition to high risk of fetal/ morbidity/mortality. Recommend  evaluation if delivers at a viable gestation and resuscitative attempts are successful.   - - Current gestational age 23 weeks - start neuromag (12h) and ANCS. Will be on continuous monitoring while neuromag is infusing then de-escalate to NST 1h BID if no signs of distress. NICU consult ordered.     2. cHTN  - Continue home med of Nifedipine 30mg XL daily.  - Monitor Bps as ordered.  - If she has a mild headache, may administer Tylenol PRN.     3. BMI 32.59  -Complicates all aspects of care     4. Constipation  - Colace 200mg daily  -Add one time order of Milk of Mag today  -Miralax BID     5. Nausea  - Consistent with pregnancy type symptoms.   - ordered Vitamin B6  - Zofran PRN     6. Abnormal OGTT  - Failed 1 hr ogtt on  (early)  - Passed early 3hr OGTT  - Plan will be repeat glucose test between 24-28 weeks (at least 1 week after steroids). OK to skip glucola and go to 3 HR if patient desires.       Thank you for allowing us to participate in the care of your patient. If you have any questions/concerns, please do not hesitate to contact us.     Randi Cooper MD  Maternal Fetal Medicine

## 2024-04-30 NOTE — CONSULTS
Critical Care  Services  [facility]    Neonatology Consultation Report          REASON FOR CONSULTATION: 23 0/7 twin gestation; Fetal demise twin A; PPROM twin B    REFERRING PHYSICIAN: Marcio Palacios MD    HISTORY:   This is a 23 year old  at 23 0/7 weeks gestation that was admitted with mono-di twin gestation with fetal demise of twin A and premature prolonged rupture of membranes of twin B oin 24. Oligohydramnios with twin B. Latest LOLY 2 cm. There is also a history of chronic hypertension treated with Nifedipine. One hour Glucose tolerance test was abnormal with normal 3 hour testing. She denies contractions and has had no signs of progression of labor. No signs of infection. Starting intervention and monitoring now at limit of viability. She has started on a course of Celestone and neuroprotective Magnesium Sulfate.       ASSESSMENT:  IUP at 23 0/7 twin gestation with fetal demise of twin A; PPROM of twin B with oligohydramnios; chronic HTN. I discussed with mother the typical course at 23 weeks including respiratory, nutritional, and cardiorespiratory instability. I explained limits of viability. I emphasized the need for mechanical ventilation and umbilical line placement. We discussed the typical survival and disability rates for infants at this gestation. We discussed the risks and complications of Oligohydramnios with rupture membranes prior to viability. I emphasized a poor prognosis based on effects of lung maturity seen with previable rupture of membranes. Mother states the desire to attempt full resuscitation and care of the infant with the understanding of the poor likely outcome. I encouraged mother to provide expressed breast milk for her  infant. Our conversation was conducted using the medical translation line. She stated understanding of all discussed.      RECOMMENDATIONS:  Please continue to keep us updated on mother's hospital course. Please  feel free to call for further questions. Thank you for allowing us to participate in the care of this family.       Signature of MD/NNP: Sohan Currie MD  Time: 55 minutes total time of consult including review of maternal chart, face time with mother, and preparing of consult note.

## 2024-04-30 NOTE — PROGRESS NOTES
Antepartum Progress Note        Subjective:      Lily Mckeon is a 23 y.o.  @23w0d who is admitted for management of PPROM in the setting of twin gestation with fetal demise of Twin A.    ROSHAN. Had minimal brownish discharge overnight consistent with old blood, no jadon bleeding. No new sx. Denies abdominal pain or contractions. Feeling normal fetal movement.    Objective:      Temp:  [98.6 °F (37 °C)-99 °F (37.2 °C)] 98.6 °F (37 °C)  Pulse:  [81-98] 81  Resp:  [16-18] 18  SpO2:  [99 %] 99 %  BP: (104-125)/(65-72) 112/72  Body mass index is 32.59 kg/m².    General: no acute distress, sitting bedside  Resp: No increased WOB  CV: regular rate  Abd: soft, nt, nd, gravid  MSK: R brachial PICC line   Ext: No erythema or calf tenderness  .   FHT: 130s-140s today     Growth imaging Dr. Calvin:   Twin B is breech.    bpm .   Posterior placenta    grams   MVP 2 cm      Assessment/Plan      IUP at  @ 23w0d admitted for management of PPROM.    Group & Rh   Date Value Ref Range Status   2024 O POS  Final     # PPROM  - Positive ROM +   - Latency antibiotics:   S/p azithromycin 1 g on day 1 and day 5   S/p ampicillin IV 2 grams q 6 hours for 48 hours   S/p amoxicillin 500 mg TID  - GC/ CT : negative   - GBS: negative   - wet prep: negative   - In house until delivery at 34 weeks    - BMZ and magnesium sulfate today. Continuous fetal monitoring while on magnesium then de-escalate to NST 1h BID if no signs of distress per MFM recommendations.  - Will continue to monitor vaginal bleeding. If patient complaints of abdominal pain, place on continuous tocometer      # Chronic HTN   - continue daily Procardia 30 XL   - ASA 81 daily   - will continue to monitor blood pressures      # Mono-Di twins with retained fetal demise of Twin A   - FHT q shift   - PNV daily     #Glucose testing   - 1 hr GTT O'Luu 190  - 3 hr glucose normal   - will repeat Glucola at 24-28 weeks. Needs to be  completed at least 7 days AFTER corticosteroids administration @ 23 weeks     #Anemia   - H/H 9.7/28.3 on 4/28.  - PO iron    Discussed with staff.    Wicho Teixeira MD  [employer] [facility], PGY-2

## 2024-05-01 LAB
BASOPHILS # BLD AUTO: 0.03 X10(3)/MCL
BASOPHILS NFR BLD AUTO: 0.3 %
EOSINOPHIL # BLD AUTO: 0.04 X10(3)/MCL (ref 0–0.9)
EOSINOPHIL NFR BLD AUTO: 0.3 %
ERYTHROCYTE [DISTWIDTH] IN BLOOD BY AUTOMATED COUNT: 12.5 % (ref 11.5–17)
GROUP & RH: NORMAL
HCT VFR BLD AUTO: 28.9 % (ref 37–47)
HGB BLD-MCNC: 10 G/DL (ref 12–16)
IMM GRANULOCYTES # BLD AUTO: 0.06 X10(3)/MCL (ref 0–0.04)
IMM GRANULOCYTES NFR BLD AUTO: 0.5 %
INDIRECT COOMBS: NORMAL
LYMPHOCYTES # BLD AUTO: 1.94 X10(3)/MCL (ref 0.6–4.6)
LYMPHOCYTES NFR BLD AUTO: 16.2 %
MCH RBC QN AUTO: 29.9 PG (ref 27–31)
MCHC RBC AUTO-ENTMCNC: 34.6 G/DL (ref 33–36)
MCV RBC AUTO: 86.5 FL (ref 80–94)
MONOCYTES # BLD AUTO: 0.7 X10(3)/MCL (ref 0.1–1.3)
MONOCYTES NFR BLD AUTO: 5.9 %
NEUTROPHILS # BLD AUTO: 9.18 X10(3)/MCL (ref 2.1–9.2)
NEUTROPHILS NFR BLD AUTO: 76.8 %
NRBC BLD AUTO-RTO: 0 %
PLATELET # BLD AUTO: 320 X10(3)/MCL (ref 130–400)
PMV BLD AUTO: 8.7 FL (ref 7.4–10.4)
RBC # BLD AUTO: 3.34 X10(6)/MCL (ref 4.2–5.4)
SPECIMEN OUTDATE: NORMAL
WBC # SPEC AUTO: 11.95 X10(3)/MCL (ref 4.5–11.5)

## 2024-05-01 PROCEDURE — 36415 COLL VENOUS BLD VENIPUNCTURE: CPT

## 2024-05-01 PROCEDURE — 25000003 PHARM REV CODE 250: Performed by: NURSE PRACTITIONER

## 2024-05-01 PROCEDURE — A4216 STERILE WATER/SALINE, 10 ML: HCPCS | Performed by: NURSE PRACTITIONER

## 2024-05-01 PROCEDURE — 25000003 PHARM REV CODE 250

## 2024-05-01 PROCEDURE — 25000003 PHARM REV CODE 250: Performed by: OBSTETRICS & GYNECOLOGY

## 2024-05-01 PROCEDURE — 11000001 HC ACUTE MED/SURG PRIVATE ROOM

## 2024-05-01 PROCEDURE — 99233 SBSQ HOSP IP/OBS HIGH 50: CPT | Mod: ,,, | Performed by: OBSTETRICS & GYNECOLOGY

## 2024-05-01 PROCEDURE — 25000003 PHARM REV CODE 250: Performed by: STUDENT IN AN ORGANIZED HEALTH CARE EDUCATION/TRAINING PROGRAM

## 2024-05-01 PROCEDURE — 85025 COMPLETE CBC W/AUTO DIFF WBC: CPT

## 2024-05-01 PROCEDURE — 63600175 PHARM REV CODE 636 W HCPCS: Performed by: STUDENT IN AN ORGANIZED HEALTH CARE EDUCATION/TRAINING PROGRAM

## 2024-05-01 PROCEDURE — 86850 RBC ANTIBODY SCREEN: CPT

## 2024-05-01 RX ADMIN — FERROUS SULFATE TAB 325 MG (65 MG ELEMENTAL FE) 1 EACH: 325 (65 FE) TAB at 08:05

## 2024-05-01 RX ADMIN — NIFEDIPINE 30 MG: 30 TABLET, FILM COATED, EXTENDED RELEASE ORAL at 08:05

## 2024-05-01 RX ADMIN — ONDANSETRON 4 MG: 4 TABLET, ORALLY DISINTEGRATING ORAL at 10:05

## 2024-05-01 RX ADMIN — PRENATAL VITAMINS-IRON FUMARATE 27 MG IRON-FOLIC ACID 0.8 MG TABLET 1 TABLET: at 08:05

## 2024-05-01 RX ADMIN — PYRIDOXINE HCL TAB 50 MG 50 MG: 50 TAB at 08:05

## 2024-05-01 RX ADMIN — POLYETHYLENE GLYCOL 3350 17 G: 17 POWDER, FOR SOLUTION ORAL at 09:05

## 2024-05-01 RX ADMIN — BETAMETHASONE ACETATE AND BETAMETHASONE SODIUM PHOSPHATE 12 MG: 3; 3 INJECTION, SUSPENSION INTRA-ARTICULAR; INTRALESIONAL; INTRAMUSCULAR; SOFT TISSUE at 07:05

## 2024-05-01 RX ADMIN — SODIUM CHLORIDE, PRESERVATIVE FREE 10 ML: 5 INJECTION INTRAVENOUS at 06:05

## 2024-05-01 RX ADMIN — DOCUSATE SODIUM 200 MG: 100 CAPSULE, LIQUID FILLED ORAL at 08:05

## 2024-05-01 RX ADMIN — SODIUM CHLORIDE, PRESERVATIVE FREE 10 ML: 5 INJECTION INTRAVENOUS at 05:05

## 2024-05-01 RX ADMIN — POLYETHYLENE GLYCOL 3350 17 G: 17 POWDER, FOR SOLUTION ORAL at 08:05

## 2024-05-01 RX ADMIN — SODIUM CHLORIDE, PRESERVATIVE FREE 10 ML: 5 INJECTION INTRAVENOUS at 12:05

## 2024-05-01 NOTE — PROGRESS NOTES
Antepartum Progress Note        Subjective:      Lily Mckeon is a 23 y.o.  @23w1d who is admitted for management of PPROM in the setting of twin gestation with fetal demise of Twin A.    ROSHAN. Denies bleeding, abdominal pain, fluid leakage, and contractions. No new sx. Feeling normal fetal movement.    Objective:      Temp:  [98.1 °F (36.7 °C)-99.2 °F (37.3 °C)] 98.6 °F (37 °C)  Pulse:  [] 93  Resp:  [15-18] 17  SpO2:  [98 %-99 %] 99 %  BP: (112-153)/(57-85) 112/59  Body mass index is 32.59 kg/m².    General: no acute distress, sitting bedside  Resp: No increased WOB  CV: regular rate  Abd: soft, nt, nd, gravid  MSK: R brachial PICC line   Ext: No erythema or calf tenderness  .   FHT: 140s today     Growth imaging Dr. Calvin:  Twin B is breech.    bpm .   Posterior placenta    grams   MVP 2 cm      Assessment/Plan      IUP at  @ 23w1d admitted for management of PPROM.    Group & Rh   Date Value Ref Range Status   2024 O POS  Final     # PPROM  - Positive ROM +  - Latency antibiotics:  S/p azithromycin 1 g on day 1 and day 5  S/p ampicillin IV 2 grams q 6 hours for 48 hours  S/p amoxicillin 500 mg TID  - GC/ CT : negative  - GBS: negative  - wet prep: negative  - In house until delivery at 34 weeks  - Now s/p BMZ x2 doses and 12 hour course of magnesium sulfate.  - Fetal monitoring with NSTs 1h BID per [facility] recs.  - Now s/p NICU consult on .  - Will continue to monitor vaginal bleeding. If patient complaints of abdominal pain, place on continuous tocometer     # Chronic HTN  - continue daily Procardia 30 XL  - ASA 81 daily  - will continue to monitor blood pressures     # Mono-Di twins with retained fetal demise of Twin A  - FHT q shift  - PNV daily    #Glucose testing   - 1 hr GTT O'Luu 190  - 3 hr glucose normal   - will repeat Glucola at 24-28 weeks. Needs to be completed at least 7 days AFTER corticosteroids administration @ 23 weeks     #Anemia   -  H/H 10/28.9 today.  - Continue PO iron    Discussed with staff.    Wicho Teixeira MD  [employer] [facility], PGY-2

## 2024-05-01 NOTE — PROGRESS NOTES
Progress Note  Maternal Fetal Medicine          Subjective:      Twin IUP at 23w1d  (with known IUFD of Twin A since 16w EGA) who presented 4/16/24 for leaking/bleeding (oligohydramnios of twin B since early second trimester).     No changes overnight in status.She is s/p both steroid doses and is s/p 12 hrs magnesium sulfate. She is s/p nicu consultation.     Nursing staff without concerns.    Objective:         Temp:  [98.1 °F (36.7 °C)-99.4 °F (37.4 °C)] 99.4 °F (37.4 °C)  Pulse:  [] 97  Resp:  [15-18] 17  SpO2:  [98 %-99 %] 98 %  BP: (112-133)/(57-85) 115/65    Gen: NAD, A&Ox3  Pulm: Unlabored breathing, LCTAB  Card: RRR  Abd: FHT present, soft, nondistended, nontender to palpation, gravid uterus   Extremities: Palpable peripheral pulses, no pedal edema, 2+ DTRs x 4    FHT: 135, minimal variability, accels present, few variables  TOCO: neg contractions  4/23: Breech, FHTs+, Twin B EFW 479g, MVP 1.85cm, oligohydramnios.   4/30: breech, posterior, LOLY 1.5cm.    Lab Review  Blood Type: O POS    Recent Results (from the past 24 hour(s))   Type & Screen    Collection Time: 05/01/24  7:16 AM   Result Value Ref Range    Group & Rh O POS     Indirect Martir GEL NEG     Specimen Outdate 05/04/2024 23:59    CBC with Differential    Collection Time: 05/01/24  7:16 AM   Result Value Ref Range    WBC 11.95 (H) 4.50 - 11.50 x10(3)/mcL    RBC 3.34 (L) 4.20 - 5.40 x10(6)/mcL    Hgb 10.0 (L) 12.0 - 16.0 g/dL    Hct 28.9 (L) 37.0 - 47.0 %    MCV 86.5 80.0 - 94.0 fL    MCH 29.9 27.0 - 31.0 pg    MCHC 34.6 33.0 - 36.0 g/dL    RDW 12.5 11.5 - 17.0 %    Platelet 320 130 - 400 x10(3)/mcL    MPV 8.7 7.4 - 10.4 fL    Neut % 76.8 %    Lymph % 16.2 %    Mono % 5.9 %    Eos % 0.3 %    Basophil % 0.3 %    Lymph # 1.94 0.6 - 4.6 x10(3)/mcL    Neut # 9.18 2.1 - 9.2 x10(3)/mcL    Mono # 0.70 0.1 - 1.3 x10(3)/mcL    Eos # 0.04 0 - 0.9 x10(3)/mcL    Baso # 0.03 <=0.2 x10(3)/mcL    IG# 0.06 (H) 0 - 0.04 x10(3)/mcL    IG% 0.5 %    NRBC% 0.0  %         Assessment:       23 y.o.  at 23w1d weeks gestation admitted for twin gestation with PPROM of twin B, vaginal bleeding, IUFD of Twin A diagnosed at 16w, cHTN, elevated msAFP   Active Hospital Problems    Diagnosis  POA    Continuing pregnancy after intrauterine death of one fetus or more, second trimester, fetus 1 [O31.22X1]  Yes     premature rupture of membranes (PPROM) with unknown onset of labor- Twin B [O42.919]  Yes    4. Chronic hypertension affecting pregnancy [O10.919]  Yes      Resolved Hospital Problems   No resolved problems to display.        Plan:     1. Twins - previable PPROM Twin B  Patient's evaluation and exam is consistent with previable PPROM (rupture of membranes prior to 22 weeks 6 days gestation). Patient was counseled regarding the extremely poor prognosis of ruptured membranes at this early gestational age. We reviewed the maternal risks of previable premature rupture of membranes which include but are not limited to chorioamnionitis, endometritis, sepsis, potential need for hysterectomy and loss of fertility, VTE, and maternal death. We reviewed the fetal risks which include stillbirth, cord prolapse, infection, pulmonary hypoplasia, risk for musculoskeletal and facial anomalies due to severe oligohydramnios, and  death. We reviewed routine pulmonary maturation and the function of amniotic fluid in the normal growth of fetal lungs. We reviewed the unlikely chance of membranes resealing from spontaneous rupture. I counseled the patient regarding management options which prior to 22 weeks (ie gestational age < 21 weeks 6 days) include conservative management, induction of labor, or dilation and evacuation. At a gestational age of 22 weeks 0 days and greater, conservative management is recommended at [facility].  Known IUFD of Twin A - diagnosed at 16w (fetus was measuring 13w6d at that time)     Recommendations:  - s/p Latency antibiotics  - Monitor for s/s  infection, abruption. Currently afebrile. No evidence of leukocytosis- weekly CBC planned  - EFW q3 weeks (completed 24). No evidence of placenta previa on most recent ultrasound.  - Once weekly AFV/presentation, ().  - Prior to PPROM and this hospital admission, we had extensively discussed the high risk for fetal morbidity/mortality given Twin B's severe oligohydramnios since 16w and significantly elevated AFP. These risks are compounded by PPROM and we discussed this even further today utilizing translating services with her significant other present in the room. They were without questions and verbalized understanding.   - VTE prophylaxis (SCDs ordered) - compliance encouraged  - Considered fetal echo since we have been unable to confidently identify chorionicity of twin gestation as well as diagnosis of IUFD of Twin A, however, we believe this attempt would be futile as imaging has always been extremely limited due to chronic, severe oligohydramnios in addition to high risk of fetal/ morbidity/mortality. Recommend  evaluation if delivers at a viable gestation and resuscitative attempts are successful.   - - ANCS - and 12 hrs magnesium sulfate  - S/p NICU consult on      2. cHTN  - Continue home med of Nifedipine 30mg XL daily.  - Monitor Bps as ordered.  - If she has a mild headache, may administer Tylenol PRN.     3. BMI 32.59  -Complicates all aspects of care     4. Constipation  - Colace 200mg daily  - Miralax BID     5. Nausea  - Consistent with pregnancy type symptoms.   - ordered Vitamin B6  - Zofran PRN     6. Abnormal OGTT  - Failed 1 hr ogtt on  (early)  - Passed early 3hr OGTT  - Plan will be repeat glucose test between 24-28 weeks (at least 1 week after steroids). OK to skip glucola and go to 3 HR if patient desires.       Thank you for allowing us to participate in the care of your patient. If you have any questions/concerns, please do not hesitate to  contact us.     Michelle Calvin MD  Maternal Fetal Medicine

## 2024-05-01 NOTE — PLAN OF CARE
Problem:  Fall Injury Risk  Goal: Absence of Fall, Infant Drop and Related Injury  Outcome: Progressing     Problem: Adult Inpatient Plan of Care  Goal: Plan of Care Review  Outcome: Progressing  Goal: Patient-Specific Goal (Individualized)  Outcome: Progressing  Goal: Absence of Hospital-Acquired Illness or Injury  Outcome: Progressing  Goal: Optimal Comfort and Wellbeing  Outcome: Progressing  Goal: Readiness for Transition of Care  Outcome: Progressing     Problem: Infection  Goal: Absence of Infection Signs and Symptoms  Outcome: Progressing     Problem: Prelabor Rupture of Membranes  Goal: Delayed Delivery with Absence of Infection  Outcome: Progressing

## 2024-05-02 ENCOUNTER — ANESTHESIA EVENT (OUTPATIENT)
Dept: OBSTETRICS AND GYNECOLOGY | Facility: HOSPITAL | Age: 24
End: 2024-05-02
Payer: MEDICAID

## 2024-05-02 ENCOUNTER — ANESTHESIA (OUTPATIENT)
Dept: OBSTETRICS AND GYNECOLOGY | Facility: HOSPITAL | Age: 24
End: 2024-05-02
Payer: MEDICAID

## 2024-05-02 LAB
BASOPHILS # BLD AUTO: 0.02 X10(3)/MCL
BASOPHILS NFR BLD AUTO: 0.2 %
EOSINOPHIL # BLD AUTO: 0.05 X10(3)/MCL (ref 0–0.9)
EOSINOPHIL NFR BLD AUTO: 0.4 %
ERYTHROCYTE [DISTWIDTH] IN BLOOD BY AUTOMATED COUNT: 12.5 % (ref 11.5–17)
HCT VFR BLD AUTO: 29.3 % (ref 37–47)
HGB BLD-MCNC: 10.2 G/DL (ref 12–16)
IMM GRANULOCYTES # BLD AUTO: 0.05 X10(3)/MCL (ref 0–0.04)
IMM GRANULOCYTES NFR BLD AUTO: 0.4 %
LYMPHOCYTES # BLD AUTO: 2.04 X10(3)/MCL (ref 0.6–4.6)
LYMPHOCYTES NFR BLD AUTO: 16.7 %
MCH RBC QN AUTO: 30.3 PG (ref 27–31)
MCHC RBC AUTO-ENTMCNC: 34.8 G/DL (ref 33–36)
MCV RBC AUTO: 86.9 FL (ref 80–94)
MONOCYTES # BLD AUTO: 0.59 X10(3)/MCL (ref 0.1–1.3)
MONOCYTES NFR BLD AUTO: 4.8 %
NEUTROPHILS # BLD AUTO: 9.45 X10(3)/MCL (ref 2.1–9.2)
NEUTROPHILS NFR BLD AUTO: 77.5 %
NRBC BLD AUTO-RTO: 0 %
PLATELET # BLD AUTO: 299 X10(3)/MCL (ref 130–400)
PMV BLD AUTO: 8.6 FL (ref 7.4–10.4)
RBC # BLD AUTO: 3.37 X10(6)/MCL (ref 4.2–5.4)
WBC # SPEC AUTO: 12.2 X10(3)/MCL (ref 4.5–11.5)

## 2024-05-02 PROCEDURE — 25000003 PHARM REV CODE 250: Performed by: OBSTETRICS & GYNECOLOGY

## 2024-05-02 PROCEDURE — 36415 COLL VENOUS BLD VENIPUNCTURE: CPT | Performed by: STUDENT IN AN ORGANIZED HEALTH CARE EDUCATION/TRAINING PROGRAM

## 2024-05-02 PROCEDURE — 85025 COMPLETE CBC W/AUTO DIFF WBC: CPT | Performed by: STUDENT IN AN ORGANIZED HEALTH CARE EDUCATION/TRAINING PROGRAM

## 2024-05-02 PROCEDURE — 99233 SBSQ HOSP IP/OBS HIGH 50: CPT | Mod: ,,, | Performed by: OBSTETRICS & GYNECOLOGY

## 2024-05-02 PROCEDURE — 25000003 PHARM REV CODE 250: Performed by: STUDENT IN AN ORGANIZED HEALTH CARE EDUCATION/TRAINING PROGRAM

## 2024-05-02 PROCEDURE — 25000003 PHARM REV CODE 250: Performed by: NURSE PRACTITIONER

## 2024-05-02 PROCEDURE — 25000003 PHARM REV CODE 250

## 2024-05-02 PROCEDURE — 11000001 HC ACUTE MED/SURG PRIVATE ROOM

## 2024-05-02 PROCEDURE — A4216 STERILE WATER/SALINE, 10 ML: HCPCS | Performed by: NURSE PRACTITIONER

## 2024-05-02 RX ADMIN — SODIUM CHLORIDE, PRESERVATIVE FREE 10 ML: 5 INJECTION INTRAVENOUS at 12:05

## 2024-05-02 RX ADMIN — POLYETHYLENE GLYCOL 3350 17 G: 17 POWDER, FOR SOLUTION ORAL at 09:05

## 2024-05-02 RX ADMIN — ONDANSETRON 4 MG: 4 TABLET, ORALLY DISINTEGRATING ORAL at 12:05

## 2024-05-02 RX ADMIN — ACETAMINOPHEN 650 MG: 325 TABLET, FILM COATED ORAL at 12:05

## 2024-05-02 RX ADMIN — PYRIDOXINE HCL TAB 50 MG 50 MG: 50 TAB at 09:05

## 2024-05-02 RX ADMIN — POLYETHYLENE GLYCOL 3350 17 G: 17 POWDER, FOR SOLUTION ORAL at 08:05

## 2024-05-02 RX ADMIN — SODIUM CHLORIDE, PRESERVATIVE FREE 10 ML: 5 INJECTION INTRAVENOUS at 07:05

## 2024-05-02 RX ADMIN — NIFEDIPINE 30 MG: 30 TABLET, FILM COATED, EXTENDED RELEASE ORAL at 09:05

## 2024-05-02 RX ADMIN — SODIUM CHLORIDE, PRESERVATIVE FREE 10 ML: 5 INJECTION INTRAVENOUS at 05:05

## 2024-05-02 RX ADMIN — DOCUSATE SODIUM 200 MG: 100 CAPSULE, LIQUID FILLED ORAL at 09:05

## 2024-05-02 RX ADMIN — FERROUS SULFATE TAB 325 MG (65 MG ELEMENTAL FE) 1 EACH: 325 (65 FE) TAB at 09:05

## 2024-05-02 RX ADMIN — PRENATAL VITAMINS-IRON FUMARATE 27 MG IRON-FOLIC ACID 0.8 MG TABLET 1 TABLET: at 09:05

## 2024-05-02 NOTE — PROGRESS NOTES
Progress Note  Maternal Fetal Medicine          Subjective:      Twin IUP at 23w2d  (with known IUFD of Twin A since 16w EGA) who presented 4/16/24 for leaking/bleeding (oligohydramnios of twin B since early second trimester).     Overnight she reported increased bleeding and cramping. She had SSE and it showed closed cervix with brown discharge/ fluid. No other changes. She says she is feeling better. She showed me the picture and it appears like old blood drainage. Slight leukocytosis.    Nursing staff without concerns.    Objective:         Temp:  [98.7 °F (37.1 °C)-99.9 °F (37.7 °C)] 98.9 °F (37.2 °C)  Pulse:  [] 74  Resp:  [16-18] 16  SpO2:  [97 %-100 %] 99 %  BP: (109-132)/(68-83) 109/76    Gen: NAD, A&Ox3  Pulm: Unlabored breathing, LCTAB  Card: RRR  Abd: FHT present, soft, nondistended, nontender to palpation, gravid uterus   Extremities: Palpable peripheral pulses, no pedal edema, 2+ DTRs x 4    FHT: 135, moderate variability, accels present, few variables  TOCO: neg contractions  4/23: Breech, FHTs+, Twin B EFW 479g, MVP 1.85cm, oligohydramnios.   4/30: breech, posterior, LOLY 1.5cm.    Lab Review  Blood Type: O POS    Recent Results (from the past 24 hour(s))   CBC with Differential    Collection Time: 05/02/24  8:18 AM   Result Value Ref Range    WBC 12.20 (H) 4.50 - 11.50 x10(3)/mcL    RBC 3.37 (L) 4.20 - 5.40 x10(6)/mcL    Hgb 10.2 (L) 12.0 - 16.0 g/dL    Hct 29.3 (L) 37.0 - 47.0 %    MCV 86.9 80.0 - 94.0 fL    MCH 30.3 27.0 - 31.0 pg    MCHC 34.8 33.0 - 36.0 g/dL    RDW 12.5 11.5 - 17.0 %    Platelet 299 130 - 400 x10(3)/mcL    MPV 8.6 7.4 - 10.4 fL    Neut % 77.5 %    Lymph % 16.7 %    Mono % 4.8 %    Eos % 0.4 %    Basophil % 0.2 %    Lymph # 2.04 0.6 - 4.6 x10(3)/mcL    Neut # 9.45 (H) 2.1 - 9.2 x10(3)/mcL    Mono # 0.59 0.1 - 1.3 x10(3)/mcL    Eos # 0.05 0 - 0.9 x10(3)/mcL    Baso # 0.02 <=0.2 x10(3)/mcL    IG# 0.05 (H) 0 - 0.04 x10(3)/mcL    IG% 0.4 %    NRBC% 0.0 %         Assessment:        23 y.o.  at 23w2d weeks gestation admitted for twin gestation with PPROM of twin B, vaginal bleeding, IUFD of Twin A diagnosed at 16w, cHTN, elevated msAFP   Active Hospital Problems    Diagnosis  POA    Continuing pregnancy after intrauterine death of one fetus or more, second trimester, fetus 1 [O31.22X1]  Yes     premature rupture of membranes (PPROM) with unknown onset of labor- Twin B [O42.919]  Yes    4. Chronic hypertension affecting pregnancy [O10.919]  Yes      Resolved Hospital Problems   No resolved problems to display.        Plan:     1. Twins - previable PPROM Twin B  Patient's evaluation and exam is consistent with previable PPROM (rupture of membranes prior to 22 weeks 6 days gestation). Patient was counseled regarding the extremely poor prognosis of ruptured membranes at this early gestational age. We reviewed the maternal risks of previable premature rupture of membranes which include but are not limited to chorioamnionitis, endometritis, sepsis, potential need for hysterectomy and loss of fertility, VTE, and maternal death. We reviewed the fetal risks which include stillbirth, cord prolapse, infection, pulmonary hypoplasia, risk for musculoskeletal and facial anomalies due to severe oligohydramnios, and  death. We reviewed routine pulmonary maturation and the function of amniotic fluid in the normal growth of fetal lungs. We reviewed the unlikely chance of membranes resealing from spontaneous rupture. I counseled the patient regarding management options which prior to 22 weeks (ie gestational age < 21 weeks 6 days) include conservative management, induction of labor, or dilation and evacuation. At a gestational age of 22 weeks 0 days and greater, conservative management is recommended at [facility].  Known IUFD of Twin A - diagnosed at 16w (fetus was measuring 13w6d at that time)     Recommendations:  - s/p Latency antibiotics  - Monitor for s/s infection, abruption. Currently  afebrile. No evidence of leukocytosis- weekly CBC planned  - EFW q3 weeks (completed 24). No evidence of placenta previa on most recent ultrasound.  - Once weekly AFV/presentation, ().  - Prior to PPROM and this hospital admission, we had extensively discussed the high risk for fetal morbidity/mortality given Twin B's severe oligohydramnios since 16w and significantly elevated AFP. These risks are compounded by PPROM and we discussed this even further today utilizing translating services with her significant other present in the room. They were without questions and verbalized understanding.   - VTE prophylaxis (SCDs ordered) - compliance encouraged  - Considered fetal echo since we have been unable to confidently identify chorionicity of twin gestation as well as diagnosis of IUFD of Twin A, however, we believe this attempt would be futile as imaging has always been extremely limited due to chronic, severe oligohydramnios in addition to high risk of fetal/ morbidity/mortality. Recommend  evaluation if delivers at a viable gestation and resuscitative attempts are successful.   - - ANCS - and 12 hrs magnesium sulfate  - S/p NICU consult on      2. cHTN  - Continue home med of Nifedipine 30mg XL daily.  - Monitor Bps as ordered.  - If she has a mild headache, may administer Tylenol PRN.     3. BMI 32.59  -Complicates all aspects of care     4. Constipation  - Colace 200mg daily  - Miralax BID     5. Nausea  - Consistent with pregnancy type symptoms.   - ordered Vitamin B6  - Zofran PRN     6. Abnormal OGTT  - Failed 1 hr ogtt on  (early)  - Passed early 3hr OGTT  - Plan will be repeat glucose test between 24-28 weeks (at least 1 week after steroids). OK to skip glucola and go to 3 HR if patient desires.       Thank you for allowing us to participate in the care of your patient. If you have any questions/concerns, please do not hesitate to contact us.     Michelle JOHN  Nkiunj GRESHAM  Maternal Fetal Medicine

## 2024-05-02 NOTE — PROGRESS NOTES
Patient complaining of increased lower abdominal pain.  Increased amount of fluid/discharge light brown in color.    No bleeding patient states that it is a constant pain   Positive fetal movement   No fever chills  Sterile speculum done appeared closed with a small amount of light brown discharge  Declined pain medication when offered  Continue expectant management

## 2024-05-02 NOTE — PROGRESS NOTES
Antepartum Progress Note        Subjective:      Lily Mckeon is a 23 y.o.  @23w2d who is admitted for management of PPROM in the setting of twin gestation with fetal demise of Twin A.    Endorses increased lower abdominal pain overnight with some leakage of fluid along with brown tinge. Had speculum exam done, no worsening in pain since then, reports it is stable. Denies fever/chills. Feeling normal fetal movement.    Objective:      Temp:  [98.7 °F (37.1 °C)-99.9 °F (37.7 °C)] 98.8 °F (37.1 °C)  Pulse:  [] 94  Resp:  [16-18] 16  SpO2:  [97 %-100 %] 99 %  BP: (109-132)/(68-83) 122/73  Body mass index is 32.7 kg/m².    General: no acute distress, sitting bedside  Resp: No increased WOB  CV: regular rate  Abd: soft, nt, nd, gravid  MSK: R brachial PICC line   Ext: No erythema or calf tenderness    Exam by Dr. Montesinos on :  Sterile speculum done appeared closed with a small amount of light brown discharge.    FHT: 140s today     Growth imaging Dr. Calvin:  Twin B is breech.    bpm .   Posterior placenta    grams   MVP 2 cm     : breech, posterior, LOLY 1.5cm.     Assessment/Plan      IUP at  @ 23w2d admitted for management of PPROM.    Group & Rh   Date Value Ref Range Status   2024 O POS  Final     # PPROM  - Positive ROM +  - Latency antibiotics:  S/p azithromycin 1 g on day 1 and day 5  S/p ampicillin IV 2 grams q 6 hours for 48 hours  S/p amoxicillin 500 mg TID  - GC/ CT : negative  - GBS: negative  - wet prep: negative  - In house until delivery at 34 weeks  - Now s/p BMZ x2 doses and 12 hour course of magnesium sulfate.  - Fetal monitoring with NSTs 1h BID per [facility] recs.  - Now s/p NICU consult on .  - Will continue to monitor vaginal bleeding. If patient complaints of abdominal pain, place on continuous tocometer.  - : increased abdominal pain, speculum exam reassuring. MFM aware. Repeat CBC done with stable leukocytosis noted at 12.2. Will  continue to monitor.     # Chronic HTN  - continue daily Procardia 30 XL  - ASA 81 daily  - will continue to monitor blood pressures     # Mono-Di twins with retained fetal demise of Twin A  - FHT q shift  - PNV daily    #Glucose testing   - 1 hr GTT O'Luu 190  - 3 hr glucose normal   - will repeat Glucola at 24-28 weeks. Needs to be completed at least 7 days AFTER corticosteroids administration @ 23 weeks     #Anemia   - H/H 10.2/29.3 today.  - Continue PO iron    Discussed with staff.    Wicho Teixeira MD  [employer] [facility], PGY-2

## 2024-05-03 PROCEDURE — 25000003 PHARM REV CODE 250: Performed by: NURSE PRACTITIONER

## 2024-05-03 PROCEDURE — A4216 STERILE WATER/SALINE, 10 ML: HCPCS | Performed by: NURSE PRACTITIONER

## 2024-05-03 PROCEDURE — 25000003 PHARM REV CODE 250: Performed by: STUDENT IN AN ORGANIZED HEALTH CARE EDUCATION/TRAINING PROGRAM

## 2024-05-03 PROCEDURE — 11000001 HC ACUTE MED/SURG PRIVATE ROOM

## 2024-05-03 PROCEDURE — 25000003 PHARM REV CODE 250

## 2024-05-03 PROCEDURE — 99232 SBSQ HOSP IP/OBS MODERATE 35: CPT | Mod: ,,, | Performed by: OBSTETRICS & GYNECOLOGY

## 2024-05-03 PROCEDURE — 25000003 PHARM REV CODE 250: Performed by: OBSTETRICS & GYNECOLOGY

## 2024-05-03 RX ADMIN — SODIUM CHLORIDE, PRESERVATIVE FREE 10 ML: 5 INJECTION INTRAVENOUS at 12:05

## 2024-05-03 RX ADMIN — POLYETHYLENE GLYCOL 3350 17 G: 17 POWDER, FOR SOLUTION ORAL at 09:05

## 2024-05-03 RX ADMIN — PYRIDOXINE HCL TAB 50 MG 50 MG: 50 TAB at 09:05

## 2024-05-03 RX ADMIN — NIFEDIPINE 30 MG: 30 TABLET, FILM COATED, EXTENDED RELEASE ORAL at 09:05

## 2024-05-03 RX ADMIN — SODIUM CHLORIDE, PRESERVATIVE FREE 10 ML: 5 INJECTION INTRAVENOUS at 05:05

## 2024-05-03 RX ADMIN — SODIUM CHLORIDE, PRESERVATIVE FREE 10 ML: 5 INJECTION INTRAVENOUS at 11:05

## 2024-05-03 RX ADMIN — FERROUS SULFATE TAB 325 MG (65 MG ELEMENTAL FE) 1 EACH: 325 (65 FE) TAB at 09:05

## 2024-05-03 RX ADMIN — ONDANSETRON 4 MG: 4 TABLET, ORALLY DISINTEGRATING ORAL at 11:05

## 2024-05-03 RX ADMIN — DOCUSATE SODIUM 200 MG: 100 CAPSULE, LIQUID FILLED ORAL at 09:05

## 2024-05-03 RX ADMIN — PRENATAL VITAMINS-IRON FUMARATE 27 MG IRON-FOLIC ACID 0.8 MG TABLET 1 TABLET: at 09:05

## 2024-05-03 RX ADMIN — SODIUM CHLORIDE, PRESERVATIVE FREE 10 ML: 5 INJECTION INTRAVENOUS at 06:05

## 2024-05-03 NOTE — PROGRESS NOTES
Patient resting in bed with no problems at this time - Patient told to call if she needs anything and she vu

## 2024-05-03 NOTE — PROGRESS NOTES
Progress Note  Maternal Fetal Medicine          Subjective:      Twin IUP at 23w3d  (with known IUFD of Twin A since 16w EGA) who presented 24 for leaking/bleeding (oligohydramnios of twin B since early second trimester).     Some left sided abdominal pain that is not tender to touch is reported. No bleeding or discrete ctx overnight. She is overall feeling well. WBC trending up (recent steroids).    Nursing staff without concerns.    Objective:         Temp:  [98.2 °F (36.8 °C)-99 °F (37.2 °C)] 98.8 °F (37.1 °C)  Pulse:  [] 73  Resp:  [16-17] 17  BP: (113-121)/(62-80) 118/76    Gen: NAD, A&Ox3  Pulm: Unlabored breathing, LCTAB  Card: RRR  Abd: FHT present, soft, nondistended, nontender to palpation, gravid uterus   Extremities: Palpable peripheral pulses, no pedal edema, 2+ DTRs x 4    FHT: 145, 150, moderate variability, accels present, neg decels  TOCO: neg contractions  : Breech, FHTs+, Twin B EFW 479g, MVP 1.85cm, oligohydramnios.   : breech, posterior, LOLY 1.5cm.    Lab Review  Blood Type: O POS    No results found for this or any previous visit (from the past 24 hour(s)).        Assessment:       23 y.o.  at 3weeks gestation admitted for twin gestation with PPROM of twin B, vaginal bleeding, IUFD of Twin A diagnosed at 16w, cHTN, elevated msAFP   Active Hospital Problems    Diagnosis  POA    Continuing pregnancy after intrauterine death of one fetus or more, second trimester, fetus 1 [O31.22X1]  Yes     premature rupture of membranes (PPROM) with unknown onset of labor- Twin B [O42.919]  Yes    4. Chronic hypertension affecting pregnancy [O10.919]  Yes      Resolved Hospital Problems   No resolved problems to display.        Plan:     1. Twins - previable PPROM Twin B  Patient's evaluation and exam is consistent with previable PPROM (rupture of membranes prior to 22 weeks 6 days gestation). Patient was counseled regarding the extremely poor prognosis of ruptured membranes at  this early gestational age. We reviewed the maternal risks of previable premature rupture of membranes which include but are not limited to chorioamnionitis, endometritis, sepsis, potential need for hysterectomy and loss of fertility, VTE, and maternal death. We reviewed the fetal risks which include stillbirth, cord prolapse, infection, pulmonary hypoplasia, risk for musculoskeletal and facial anomalies due to severe oligohydramnios, and  death. We reviewed routine pulmonary maturation and the function of amniotic fluid in the normal growth of fetal lungs. We reviewed the unlikely chance of membranes resealing from spontaneous rupture. I counseled the patient regarding management options which prior to 22 weeks (ie gestational age < 21 weeks 6 days) include conservative management, induction of labor, or dilation and evacuation. At a gestational age of 22 weeks 0 days and greater, conservative management is recommended at [facility].  Known IUFD of Twin A - diagnosed at 16w (fetus was measuring 13w6d at that time)     Recommendations:  - s/p Latency antibiotics  - Monitor for s/s infection, abruption. Currently afebrile. No evidence of leukocytosis- weekly CBC planned  - EFW q3 weeks (completed 24). No evidence of placenta previa on most recent ultrasound.  - Once weekly AFV/presentation, ().  - Prior to PPROM and this hospital admission, we had extensively discussed the high risk for fetal morbidity/mortality given Twin B's severe oligohydramnios since 16w and significantly elevated AFP. These risks are compounded by PPROM and we discussed this even further today utilizing translating services with her significant other present in the room. They were without questions and verbalized understanding.   - VTE prophylaxis (SCDs ordered) - compliance encouraged  - Considered fetal echo since we have been unable to confidently identify chorionicity of twin gestation as well as diagnosis of IUFD of Twin  A, however, we believe this attempt would be futile as imaging has always been extremely limited due to chronic, severe oligohydramnios in addition to high risk of fetal/ morbidity/mortality. Recommend  evaluation if delivers at a viable gestation and resuscitative attempts are successful.   - - ANCS - and 12 hrs magnesium sulfate  - S/p NICU consult on      2. cHTN  - Continue home med of Nifedipine 30mg XL daily.  - Monitor Bps as ordered.  - If she has a mild headache, may administer Tylenol PRN.     3. BMI 32.59  -Complicates all aspects of care     4. Constipation  - Colace 200mg daily  - Miralax BID     5. Nausea  - Consistent with pregnancy type symptoms.   - ordered Vitamin B6  - Zofran PRN     6. Abnormal OGTT  - Failed 1 hr ogtt on  (early)  - Passed early 3hr OGTT  - Plan will be repeat glucose test between 24-28 weeks (at least 1 week after steroids). OK to skip glucola and go to 3 HR if patient desires.       Thank you for allowing us to participate in the care of your patient. If you have any questions/concerns, please do not hesitate to contact us.     Michelle Calvin MD  Maternal Fetal Medicine

## 2024-05-03 NOTE — PROGRESS NOTES
Patient sleeping - Patient denies having any problems at this time - Patient told to call if she needs anything and she vu

## 2024-05-03 NOTE — PROGRESS NOTES
Antepartum Progress Note        Subjective:      Lily Mckeon is a 23 y.o.  @23w3d who is admitted for management of PPROM in the setting of twin gestation with fetal demise of Twin A.    Endorses improvement in abdominal pain overnight. No further leaking or brown discharge overnight. Denies fever/chills. Feeling normal fetal movement.    Objective:      Temp:  [98.2 °F (36.8 °C)-99 °F (37.2 °C)] 98.8 °F (37.1 °C)  Pulse:  [] 73  Resp:  [16-17] 17  BP: (113-121)/(62-80) 118/76  Body mass index is 32.7 kg/m².    General: no acute distress, sitting bedside  Resp: No increased WOB  CV: regular rate  Abd: soft, nt, nd, gravid  MSK: R brachial PICC line   Ext: No erythema or calf tenderness    Exam by Dr. Montesinos on :  Sterile speculum done appeared closed with a small amount of light brown discharge.    FHT: 140s today     Growth imaging Dr. Calvin:  Twin B is breech.    bpm .   Posterior placenta    grams   MVP 2 cm     : breech, posterior, LOLY 1.5cm.    Assessment/Plan      IUP at  @ 23w3d admitted for management of PPROM.    Group & Rh   Date Value Ref Range Status   2024 O POS  Final     # PPROM  - Positive ROM +  - Latency antibiotics:  S/p azithromycin 1 g on day 1 and day 5  S/p ampicillin IV 2 grams q 6 hours for 48 hours  S/p amoxicillin 500 mg TID  - GC/ CT : negative  - GBS: negative  - wet prep: negative  - In house until delivery at 34 weeks  - Now s/p BMZ x2 doses and 12 hour course of magnesium sulfate.  - Fetal monitoring with NSTs 1h BID per [facility] recs.  - Now s/p NICU consult on .  - Will continue to monitor vaginal bleeding. If patient complaints of abdominal pain, place on continuous tocometer.  - : increased abdominal pain, speculum exam reassuring. M aware. Repeat CBC done with stable leukocytosis noted at 12.2. Will continue to monitor.     # Chronic HTN  - continue daily Procardia 30 XL  - ASA 81 daily  - will continue to monitor  blood pressures     # Mono-Di twins with retained fetal demise of Twin A  - NSTs 1 hr BID  - PNV daily    #Glucose testing   - 1 hr GTT O'Luu 190  - 3 hr glucose normal   - will repeat Glucola at 24-28 weeks. Needs to be completed at least 7 days AFTER corticosteroids administration @ 23 weeks     #Anemia   - H/H 10.2/29.3 on 5/2.  - Continue PO iron    Discussed with staff.    Wicho Teixeira MD  [employer] [facility], PGY-2

## 2024-05-03 NOTE — PROGRESS NOTES
Patient laying in bed - Patient about to eat supper - Patient denies any problems and was told to call if she needs anything and she vu

## 2024-05-04 LAB
BASOPHILS # BLD AUTO: 0.04 X10(3)/MCL
BASOPHILS NFR BLD AUTO: 0.3 %
EOSINOPHIL # BLD AUTO: 0.26 X10(3)/MCL (ref 0–0.9)
EOSINOPHIL NFR BLD AUTO: 2.2 %
ERYTHROCYTE [DISTWIDTH] IN BLOOD BY AUTOMATED COUNT: 12.7 % (ref 11.5–17)
GROUP & RH: NORMAL
HCT VFR BLD AUTO: 29.6 % (ref 37–47)
HGB BLD-MCNC: 10.4 G/DL (ref 12–16)
IMM GRANULOCYTES # BLD AUTO: 0.07 X10(3)/MCL (ref 0–0.04)
IMM GRANULOCYTES NFR BLD AUTO: 0.6 %
INDIRECT COOMBS: NORMAL
LYMPHOCYTES # BLD AUTO: 3 X10(3)/MCL (ref 0.6–4.6)
LYMPHOCYTES NFR BLD AUTO: 25.7 %
MCH RBC QN AUTO: 30.2 PG (ref 27–31)
MCHC RBC AUTO-ENTMCNC: 35.1 G/DL (ref 33–36)
MCV RBC AUTO: 86 FL (ref 80–94)
MONOCYTES # BLD AUTO: 0.66 X10(3)/MCL (ref 0.1–1.3)
MONOCYTES NFR BLD AUTO: 5.7 %
NEUTROPHILS # BLD AUTO: 7.65 X10(3)/MCL (ref 2.1–9.2)
NEUTROPHILS NFR BLD AUTO: 65.5 %
NRBC BLD AUTO-RTO: 0 %
PLATELET # BLD AUTO: 311 X10(3)/MCL (ref 130–400)
PMV BLD AUTO: 8.6 FL (ref 7.4–10.4)
RBC # BLD AUTO: 3.44 X10(6)/MCL (ref 4.2–5.4)
SPECIMEN OUTDATE: NORMAL
WBC # SPEC AUTO: 11.68 X10(3)/MCL (ref 4.5–11.5)

## 2024-05-04 PROCEDURE — 36415 COLL VENOUS BLD VENIPUNCTURE: CPT

## 2024-05-04 PROCEDURE — 85025 COMPLETE CBC W/AUTO DIFF WBC: CPT

## 2024-05-04 PROCEDURE — 25000003 PHARM REV CODE 250: Performed by: NURSE PRACTITIONER

## 2024-05-04 PROCEDURE — 86850 RBC ANTIBODY SCREEN: CPT

## 2024-05-04 PROCEDURE — 11000001 HC ACUTE MED/SURG PRIVATE ROOM

## 2024-05-04 PROCEDURE — 99233 SBSQ HOSP IP/OBS HIGH 50: CPT | Mod: ,,, | Performed by: NURSE PRACTITIONER

## 2024-05-04 PROCEDURE — 25000003 PHARM REV CODE 250

## 2024-05-04 PROCEDURE — A4216 STERILE WATER/SALINE, 10 ML: HCPCS | Performed by: NURSE PRACTITIONER

## 2024-05-04 PROCEDURE — 63600175 PHARM REV CODE 636 W HCPCS: Mod: JG | Performed by: OBSTETRICS & GYNECOLOGY

## 2024-05-04 PROCEDURE — 25000003 PHARM REV CODE 250: Performed by: OBSTETRICS & GYNECOLOGY

## 2024-05-04 PROCEDURE — 25000003 PHARM REV CODE 250: Performed by: STUDENT IN AN ORGANIZED HEALTH CARE EDUCATION/TRAINING PROGRAM

## 2024-05-04 RX ADMIN — SODIUM CHLORIDE, PRESERVATIVE FREE 10 ML: 5 INJECTION INTRAVENOUS at 07:05

## 2024-05-04 RX ADMIN — DOCUSATE SODIUM 200 MG: 100 CAPSULE, LIQUID FILLED ORAL at 09:05

## 2024-05-04 RX ADMIN — POLYETHYLENE GLYCOL 3350 17 G: 17 POWDER, FOR SOLUTION ORAL at 09:05

## 2024-05-04 RX ADMIN — NIFEDIPINE 30 MG: 30 TABLET, FILM COATED, EXTENDED RELEASE ORAL at 09:05

## 2024-05-04 RX ADMIN — SODIUM CHLORIDE, PRESERVATIVE FREE 10 ML: 5 INJECTION INTRAVENOUS at 12:05

## 2024-05-04 RX ADMIN — SODIUM CHLORIDE, PRESERVATIVE FREE 10 ML: 5 INJECTION INTRAVENOUS at 09:05

## 2024-05-04 RX ADMIN — SODIUM CHLORIDE, PRESERVATIVE FREE 10 ML: 5 INJECTION INTRAVENOUS at 05:05

## 2024-05-04 RX ADMIN — ONDANSETRON 4 MG: 4 TABLET, ORALLY DISINTEGRATING ORAL at 11:05

## 2024-05-04 RX ADMIN — ALTEPLASE 1 MG: 2.2 INJECTION, POWDER, LYOPHILIZED, FOR SOLUTION INTRAVENOUS at 08:05

## 2024-05-04 RX ADMIN — PYRIDOXINE HCL TAB 50 MG 50 MG: 50 TAB at 09:05

## 2024-05-04 RX ADMIN — FERROUS SULFATE TAB 325 MG (65 MG ELEMENTAL FE) 1 EACH: 325 (65 FE) TAB at 09:05

## 2024-05-04 RX ADMIN — PRENATAL VITAMINS-IRON FUMARATE 27 MG IRON-FOLIC ACID 0.8 MG TABLET 1 TABLET: at 09:05

## 2024-05-04 NOTE — PROGRESS NOTES
Antepartum Progress Note        Subjective:      Lily Mckeon is a 23 y.o.  @23w4d who is admitted for management of PPROM in the setting of twin gestation with fetal demise of Twin A.    Pt with episode of bleeding this morning noted with bathroom visit. Per pt phone picture, small amount of bright-dark red blood noted on personal pad with pinkish fluid noted to tissue. Pt continues to deny abdominal pain, cramping, contractions, vaginal or pelvic pressure. Speculum exam completed this morning reveals no active bleeding, scant amount of dark blood noted in vault. Denies fever/chills. Feeling normal fetal movement.    Objective:      Temp:  [97.6 °F (36.4 °C)-98.9 °F (37.2 °C)] 98.8 °F (37.1 °C)  Pulse:  [74-99] 87  Resp:  [15-18] 16  SpO2:  [99 %] 99 %  BP: (111-131)/(59-84) 129/78  Body mass index is 32.7 kg/m².    General: no acute distress, sitting bedside  Resp: No increased WOB  CV: regular rate  Abd: soft, nt, nd, gravid  MSK: R brachial PICC line   Ext: No erythema or calf tenderness      FHT: 140, reassuring  [place]: no contractions     Growth imaging Dr. Calvin:  Twin B is breech.    bpm .   Posterior placenta    grams   MVP 2 cm     : breech, posterior, LOLY 1.5cm.    Assessment/Plan      IUP at  @ 23w4d admitted for management of PPROM of twin B, vaginal bleeding, IUFD of twin A diagnosed at 16 weeks, [place].    Group & Rh   Date Value Ref Range Status   2024 O POS  Final     # PPROM  - Positive ROM +  - Latency antibiotics:  S/p azithromycin 1 g on day 1 and day 5  S/p ampicillin IV 2 grams q 6 hours for 48 hours  S/p amoxicillin 500 mg TID  - GC/ CT : negative  - GBS: negative  - wet prep: negative  - In house until delivery at 34 weeks  - Now s/p BMZ x2 doses and 12 hour course of magnesium sulfate.  - Fetal monitoring with NSTs 1h BID per [facility] recs.  - Now s/p NICU consult on .  - Will continue to monitor vaginal bleeding. If patient complaints of  abdominal pain, place on continuous tocometer.  - 5/4: vaginal bleeding, speculum exam reassuring. MFM aware.      # Chronic HTN  - continue daily Procardia 30 XL  - ASA 81 daily  - will continue to monitor blood pressures     # Mono-Di twins with retained fetal demise of Twin A  - NSTs 1 hr BID  - PNV daily    #Glucose testing   - 1 hr GTT O'Luu 190  - 3 hr glucose normal   - will repeat Glucola at 24-28 weeks. Needs to be completed at least 7 days AFTER corticosteroids administration @ 23 weeks     #Anemia   - H/H 10.2/29.3 on 5/2.  - Continue PO iron      Donya Lena, NP

## 2024-05-04 NOTE — PROGRESS NOTES
Progress Note  Maternal Fetal Medicine          Subjective:      IUP at 23w4d - (with known IUFD of Twin A since 16w EGA) who presented 4/16/24 for leaking/bleeding (oligohydramnios of twin B since early second trimester).     She reports sleeping well last night and is without complaints. She specifically denies vaginal leaking, abd pain/contractions. Positive fetal movement. Reports small amount of vaginal bleeding (bright red to brown) over the past 24hrs. She had pictures on her phone that she shared - peripad with small amt of blood noted. Not changing pads frequently.     Nursing staff without concerns.     Objective:         Temp:  [97.6 °F (36.4 °C)-98.9 °F (37.2 °C)] 98 °F (36.7 °C)  Pulse:  [74-99] 76  Resp:  [15-18] 18  SpO2:  [99 %] 99 %  BP: (111-131)/(59-84) 117/67    Gen: NAD, A&Ox3  Pulm: Unlabored breathing, LCTAB  Card: RRR  Abd: FHT present, soft, nondistended, nontender to palpation, gravid uterus palpable c/w gestational age  Extremities: Palpable peripheral pulses, no pedal edema, 2+ DTRs x 4    NST: 140 baseline, min-moderate BTBV, pos accelerations, neg decelerations  [place]: none  4/23: Breech, FHTs+, Twin B EFW 479g, MVP 1.85cm, oligohydramnios.   4/30: breech, posterior, LOLY 1.5cm.    Lab Review  Blood Type: O POS    Recent Results (from the past 24 hour(s))   Type & Screen    Collection Time: 05/04/24  6:23 AM   Result Value Ref Range    Group & Rh O POS     Indirect Martir GEL NEG     Specimen Outdate 05/07/2024 23:59    CBC with Differential    Collection Time: 05/04/24  6:23 AM   Result Value Ref Range    WBC 11.68 (H) 4.50 - 11.50 x10(3)/mcL    RBC 3.44 (L) 4.20 - 5.40 x10(6)/mcL    Hgb 10.4 (L) 12.0 - 16.0 g/dL    Hct 29.6 (L) 37.0 - 47.0 %    MCV 86.0 80.0 - 94.0 fL    MCH 30.2 27.0 - 31.0 pg    MCHC 35.1 33.0 - 36.0 g/dL    RDW 12.7 11.5 - 17.0 %    Platelet 311 130 - 400 x10(3)/mcL    MPV 8.6 7.4 - 10.4 fL    Neut % 65.5 %    Lymph % 25.7 %    Mono % 5.7 %    Eos % 2.2 %    Basophil  % 0.3 %    Lymph # 3.00 0.6 - 4.6 x10(3)/mcL    Neut # 7.65 2.1 - 9.2 x10(3)/mcL    Mono # 0.66 0.1 - 1.3 x10(3)/mcL    Eos # 0.26 0 - 0.9 x10(3)/mcL    Baso # 0.04 <=0.2 x10(3)/mcL    IG# 0.07 (H) 0 - 0.04 x10(3)/mcL    IG% 0.6 %    NRBC% 0.0 %       Assessment:       23 y.o.  at 23w4d weeks gestation admitted for twin gestation with PPROM of twin B, vaginal bleeding, IUFD of Twin A diagnosed at 16w, cHTN, elevated msAFP     Active Hospital Problems    Diagnosis  POA    Continuing pregnancy after intrauterine death of one fetus or more, second trimester, fetus 1 [O31.22X1]  Yes     premature rupture of membranes (PPROM) with unknown onset of labor- Twin B [O42.919]  Yes    4. Chronic hypertension affecting pregnancy [O10.919]  Yes      Resolved Hospital Problems   No resolved problems to display.        Plan:     1. Twins - previable PPROM Twin B  Patient's evaluation and exam is consistent with previable PPROM (rupture of membranes prior to 22 weeks 6 days gestation). Patient was counseled regarding the extremely poor prognosis of ruptured membranes at this early gestational age. We reviewed the maternal risks of previable premature rupture of membranes which include but are not limited to chorioamnionitis, endometritis, sepsis, potential need for hysterectomy and loss of fertility, VTE, and maternal death. We reviewed the fetal risks which include stillbirth, cord prolapse, infection, pulmonary hypoplasia, risk for musculoskeletal and facial anomalies due to severe oligohydramnios, and  death. We reviewed routine pulmonary maturation and the function of amniotic fluid in the normal growth of fetal lungs. We reviewed the unlikely chance of membranes resealing from spontaneous rupture. I counseled the patient regarding management options which prior to 22 weeks (ie gestational age < 21 weeks 6 days) include conservative management, induction of labor, or dilation and evacuation. At a  gestational age of 22 weeks 0 days and greater, conservative management is recommended at [facility].  Known IUFD of Twin A - diagnosed at 16w (fetus was measuring 13w6d at that time)     Recommendations:  - s/p Latency antibiotics  - Monitor for s/s infection, abruption. Currently afebrile. No evidence of leukocytosis- weekly CBC planned  - EFW q3 weeks (completed 24). No evidence of placenta previa on most recent ultrasound.  - Once weekly AFV/presentation, ().  - Prior to PPROM and this hospital admission, we had extensively discussed the high risk for fetal morbidity/mortality given Twin B's severe oligohydramnios since 16w and significantly elevated AFP. These risks are compounded by PPROM and we discussed this even further today utilizing translating services with her significant other present in the room. They were without questions and verbalized understanding.   - VTE prophylaxis (SCDs ordered) - compliance encouraged  - Considered fetal echo since we have been unable to confidently identify chorionicity of twin gestation as well as diagnosis of IUFD of Twin A, however, we believe this attempt would be futile as imaging has always been extremely limited due to chronic, severe oligohydramnios in addition to high risk of fetal/ morbidity/mortality. Recommend  evaluation if delivers at a viable gestation and resuscitative attempts are successful.   - - ANCS - and 12 hrs magnesium sulfate  - S/p NICU consult on      2. cHTN  - Continue home med of Nifedipine 30mg XL daily.  - Monitor Bps as ordered.  - If she has a mild headache, may administer Tylenol PRN.     3. BMI 32.59  -Complicates all aspects of care     4. Constipation  - Colace 200mg daily  - Miralax BID     5. Nausea  - Consistent with pregnancy type symptoms.   - ordered Vitamin B6  - Zofran PRN     6. Abnormal OGTT  - Failed 1 hr ogtt on  (early)  - Passed early 3hr OGTT  - Plan will be repeat glucose  test between 24-28 weeks (at least 1 week after steroids). OK to skip glucola and go to 3 HR if patient desires.       Thank you for allowing us to participate in the care of your patient. If you have any questions/concerns, please do not hesitate to contact us.     Randi Cooper, MSN, APRN, NP-BC  Maternal Fetal Medicine

## 2024-05-05 PROCEDURE — 11000001 HC ACUTE MED/SURG PRIVATE ROOM

## 2024-05-05 PROCEDURE — 25000003 PHARM REV CODE 250: Performed by: NURSE PRACTITIONER

## 2024-05-05 PROCEDURE — 25000003 PHARM REV CODE 250: Performed by: STUDENT IN AN ORGANIZED HEALTH CARE EDUCATION/TRAINING PROGRAM

## 2024-05-05 PROCEDURE — 25000003 PHARM REV CODE 250: Performed by: OBSTETRICS & GYNECOLOGY

## 2024-05-05 PROCEDURE — A4216 STERILE WATER/SALINE, 10 ML: HCPCS | Performed by: NURSE PRACTITIONER

## 2024-05-05 PROCEDURE — 99233 SBSQ HOSP IP/OBS HIGH 50: CPT | Mod: ,,, | Performed by: NURSE PRACTITIONER

## 2024-05-05 PROCEDURE — 25000003 PHARM REV CODE 250

## 2024-05-05 RX ADMIN — PRENATAL VITAMINS-IRON FUMARATE 27 MG IRON-FOLIC ACID 0.8 MG TABLET 1 TABLET: at 10:05

## 2024-05-05 RX ADMIN — FERROUS SULFATE TAB 325 MG (65 MG ELEMENTAL FE) 1 EACH: 325 (65 FE) TAB at 10:05

## 2024-05-05 RX ADMIN — ONDANSETRON 4 MG: 4 TABLET, ORALLY DISINTEGRATING ORAL at 08:05

## 2024-05-05 RX ADMIN — NIFEDIPINE 30 MG: 30 TABLET, FILM COATED, EXTENDED RELEASE ORAL at 10:05

## 2024-05-05 RX ADMIN — POLYETHYLENE GLYCOL 3350 17 G: 17 POWDER, FOR SOLUTION ORAL at 10:05

## 2024-05-05 RX ADMIN — PYRIDOXINE HCL TAB 50 MG 50 MG: 50 TAB at 10:05

## 2024-05-05 RX ADMIN — SODIUM CHLORIDE, PRESERVATIVE FREE 10 ML: 5 INJECTION INTRAVENOUS at 11:05

## 2024-05-05 RX ADMIN — DOCUSATE SODIUM 200 MG: 100 CAPSULE, LIQUID FILLED ORAL at 10:05

## 2024-05-05 RX ADMIN — SODIUM CHLORIDE, PRESERVATIVE FREE 10 ML: 5 INJECTION INTRAVENOUS at 06:05

## 2024-05-05 RX ADMIN — POLYETHYLENE GLYCOL 3350 17 G: 17 POWDER, FOR SOLUTION ORAL at 09:05

## 2024-05-05 RX ADMIN — ACETAMINOPHEN 650 MG: 325 TABLET, FILM COATED ORAL at 10:05

## 2024-05-05 RX ADMIN — SODIUM CHLORIDE, PRESERVATIVE FREE 10 ML: 5 INJECTION INTRAVENOUS at 03:05

## 2024-05-05 NOTE — PLAN OF CARE
Problem:  Fall Injury Risk  Goal: Absence of Fall, Infant Drop and Related Injury  Outcome: Progressing     Problem: Adult Inpatient Plan of Care  Goal: Plan of Care Review  Outcome: Progressing  Flowsheets (Taken 2024 4801)  Plan of Care Reviewed With: patient  Goal: Patient-Specific Goal (Individualized)  Outcome: Progressing  Flowsheets (Taken 2024 1132)  Anxieties, Fears or Concerns:  delivery  Goal: Optimal Comfort and Wellbeing  Outcome: Progressing  Goal: Readiness for Transition of Care  Outcome: Progressing  Intervention: Mutually Develop Transition Plan  Flowsheets (Taken 2024 1132)  Do you have help at home or someone to help you manage your care at home?: Yes  Readmission within 30 days?: No  Do you currently have service(s) that help you manage your care at home?: No     Problem: Infection  Goal: Absence of Infection Signs and Symptoms  Outcome: Progressing     Problem: Prelabor Rupture of Membranes  Goal: Delayed Delivery with Absence of Infection  Outcome: Progressing

## 2024-05-05 NOTE — PROGRESS NOTES
Progress Note  Maternal Fetal Medicine          Subjective:      Twin IUP at 23w5d -  (with known IUFD of Twin A since 16w EGA) who presented 24 for leaking/bleeding (oligohydramnios of twin B since early second trimester).     Has been having small bleeding episodes. Usually dark-bright red, small amount. She had a bleeding/leaking episode yesterday morning - speculum exam performed by primary team. No active bleeding coming from os. Small amt of dark blood noted in vault. Reports positive fetal movement. Denies abd pain. Denies any episodes of bleeding/leaking since 3am.    Nursing staff without concerns.     Objective:         Temp:  [97.9 °F (36.6 °C)-98.9 °F (37.2 °C)] 98.1 °F (36.7 °C)  Pulse:  [] 84  Resp:  [16-18] 16  SpO2:  [98 %-99 %] 99 %  BP: (114-129)/(55-78) 116/60    Gen: NAD, A&Ox3  Pulm: Unlabored breathing, LCTAB  Card: RRR  Abd: FHT present, soft, nondistended, nontender to palpation, gravid uterus palpable c/w gestational age  Extremities: Palpable peripheral pulses, no pedal edema, 2+ DTRs x 4    NST: 140 baseline, min-moderate BTBV, pos accelerations, neg decelerations (occ mild variable decels) strip appropriate for gestational age  [place]: none  : Breech, FHTs+, Twin B EFW 479g, MVP 1.85cm, oligohydramnios.   : breech, posterior, LOLY 1.5cm      Lab Review  Blood Type: O POS    No results found for this or any previous visit (from the past 24 hour(s)).    Assessment:       23 y.o.  at 23w5d weeks gestation admitted for twin gestation with PPROM of twin B, vaginal bleeding, IUFD of Twin A diagnosed at 16w, cHTN, elevated msAFP     Active Hospital Problems    Diagnosis  POA    Continuing pregnancy after intrauterine death of one fetus or more, second trimester, fetus 1 [O31.22X1]  Yes     premature rupture of membranes (PPROM) with unknown onset of labor- Twin B [O42.919]  Yes    4. Chronic hypertension affecting pregnancy [O10.919]  Yes      Resolved Hospital  Problems   No resolved problems to display.        Plan:     1. Twins - previable PPROM Twin B  Patient's evaluation and exam is consistent with previable PPROM (rupture of membranes prior to 22 weeks 6 days gestation). Patient was counseled regarding the extremely poor prognosis of ruptured membranes at this early gestational age. We reviewed the maternal risks of previable premature rupture of membranes which include but are not limited to chorioamnionitis, endometritis, sepsis, potential need for hysterectomy and loss of fertility, VTE, and maternal death. We reviewed the fetal risks which include stillbirth, cord prolapse, infection, pulmonary hypoplasia, risk for musculoskeletal and facial anomalies due to severe oligohydramnios, and  death. We reviewed routine pulmonary maturation and the function of amniotic fluid in the normal growth of fetal lungs. We reviewed the unlikely chance of membranes resealing from spontaneous rupture. I counseled the patient regarding management options which prior to 22 weeks (ie gestational age < 21 weeks 6 days) include conservative management, induction of labor, or dilation and evacuation. At a gestational age of 22 weeks 0 days and greater, conservative management is recommended at [facility].  Known IUFD of Twin A - diagnosed at 16w (fetus was measuring 13w6d at that time)     Recommendations:  - s/p Latency antibiotics  - Monitor for s/s infection, abruption. Currently afebrile. No evidence of leukocytosis- weekly CBC planned  - EFW q3 weeks (completed 24). No evidence of placenta previa on most recent ultrasound.  - Once weekly AFV/presentation, ().  - Prior to PPROM and this hospital admission, we had extensively discussed the high risk for fetal morbidity/mortality given Twin B's severe oligohydramnios since 16w and significantly elevated AFP. These risks are compounded by PPROM and we discussed this even further today utilizing translating services  with her significant other present in the room. They were without questions and verbalized understanding.   - VTE prophylaxis (SCDs ordered) - compliance encouraged  - Considered fetal echo since we have been unable to confidently identify chorionicity of twin gestation as well as diagnosis of IUFD of Twin A, however, we believe this attempt would be futile as imaging has always been extremely limited due to chronic, severe oligohydramnios in addition to high risk of fetal/ morbidity/mortality. Recommend  evaluation if delivers at a viable gestation and resuscitative attempts are successful.   - - ANCS - and 12 hrs magnesium sulfate  - S/p NICU consult on      2. cHTN  - Continue home med of Nifedipine 30mg XL daily.  - Monitor Bps as ordered.  - If she has a mild headache, may administer Tylenol PRN.     3. BMI 32.59  -Complicates all aspects of care     4. Constipation  - Colace 200mg daily  - Miralax BID     5. Nausea  - Consistent with pregnancy type symptoms.   - ordered Vitamin B6  - Zofran PRN     6. Abnormal OGTT  - Failed 1 hr ogtt on  (early)  - Passed early 3hr OGTT  - Plan will be repeat glucose test between 24-28 weeks (at least 1 week after steroids). OK to skip glucola and go to 3 HR if patient desires.       Thank you for allowing us to participate in the care of your patient. If you have any questions/concerns, please do not hesitate to contact us.     Randi Cooper, MSN, APRN, WHNP-BC  Maternal Fetal Medicine

## 2024-05-05 NOTE — PROGRESS NOTES
Antepartum Progress Note        Subjective:      Lily Mckeon is a 23 y.o.  @23w5d who is admitted for management of PPROM in the setting of twin gestation with fetal demise of Twin A.    JESUSITA. Reports mild bleeding on peripard last night. Shared picture of the same. Reports positive fetal movement. Denies RUQ pain, LOF, blurry vision, HA, dizziness, CP, SOB, N/V/C/D, or calf pain.    Objective:      Temp:  [97.9 °F (36.6 °C)-98.9 °F (37.2 °C)] 98.1 °F (36.7 °C)  Pulse:  [] 84  Resp:  [16-18] 16  SpO2:  [98 %-99 %] 99 %  BP: (114-129)/(55-78) 116/60  Body mass index is 32.7 kg/m².    General: no acute distress, sitting bedside  Resp: No increased WOB  CV: regular rate  Abd: soft, nt, nd, gravid  MSK: R brachial PICC line   Ext: No erythema or calf tenderness      FHT: 140, reassuring  [place]: no contractions     Growth imaging Dr. Calvin:  Twin B is breech.    bpm .   Posterior placenta    grams   MVP 2 cm     : breech, posterior, LOLY 1.5cm.    Assessment/Plan      IUP at  @ 23w5d admitted for management of PPROM of twin B, vaginal bleeding, IUFD of twin A diagnosed at 16 weeks, [place].    Group & Rh   Date Value Ref Range Status   2024 O POS  Final     # PPROM  - Positive ROM +  - Latency antibiotics:  S/p azithromycin 1 g on day 1 and day 5  S/p ampicillin IV 2 grams q 6 hours for 48 hours  S/p amoxicillin 500 mg TID  - GC/ CT : negative  - GBS: negative  - wet prep: negative  - In house until delivery at 34 weeks  - Now s/p BMZ x2 doses and 12 hour course of magnesium sulfate.  - Fetal monitoring with NSTs 1h BID per MFM recs.  - Now s/p NICU consult on .  - Will continue to monitor vaginal bleeding. If patient complaints of abdominal pain, place on continuous tocometer.  - : vaginal bleeding, speculum exam reassuring. MFM aware.      # Chronic HTN  - continue daily Procardia 30 XL  - ASA 81 daily  - will continue to monitor blood pressures     # Mono-Di  twins with retained fetal demise of Twin A  - NSTs 1 hr BID  - PNV daily    #Glucose testing   - 1 hr GTT O'Luu 190  - 3 hr glucose normal   - will repeat Glucola at 24-28 weeks. Needs to be completed at least 7 days AFTER corticosteroids administration @ 23 weeks     #Anemia   - H/H 10.2/29.3 on 5/2.  - Continue PO iron      Conrado Tony MD, MBS  [facility] Resident, [facility]

## 2024-05-06 PROCEDURE — A4216 STERILE WATER/SALINE, 10 ML: HCPCS | Performed by: NURSE PRACTITIONER

## 2024-05-06 PROCEDURE — 25000003 PHARM REV CODE 250: Performed by: OBSTETRICS & GYNECOLOGY

## 2024-05-06 PROCEDURE — 25000003 PHARM REV CODE 250

## 2024-05-06 PROCEDURE — 25000003 PHARM REV CODE 250: Performed by: STUDENT IN AN ORGANIZED HEALTH CARE EDUCATION/TRAINING PROGRAM

## 2024-05-06 PROCEDURE — 25000003 PHARM REV CODE 250: Performed by: NURSE PRACTITIONER

## 2024-05-06 PROCEDURE — 11000001 HC ACUTE MED/SURG PRIVATE ROOM

## 2024-05-06 PROCEDURE — 99232 SBSQ HOSP IP/OBS MODERATE 35: CPT | Mod: ,,, | Performed by: OBSTETRICS & GYNECOLOGY

## 2024-05-06 RX ADMIN — PYRIDOXINE HCL TAB 50 MG 50 MG: 50 TAB at 09:05

## 2024-05-06 RX ADMIN — PRENATAL VITAMINS-IRON FUMARATE 27 MG IRON-FOLIC ACID 0.8 MG TABLET 1 TABLET: at 09:05

## 2024-05-06 RX ADMIN — SODIUM CHLORIDE, PRESERVATIVE FREE 10 ML: 5 INJECTION INTRAVENOUS at 12:05

## 2024-05-06 RX ADMIN — NIFEDIPINE 30 MG: 30 TABLET, FILM COATED, EXTENDED RELEASE ORAL at 09:05

## 2024-05-06 RX ADMIN — POLYETHYLENE GLYCOL 3350 17 G: 17 POWDER, FOR SOLUTION ORAL at 09:05

## 2024-05-06 RX ADMIN — FERROUS SULFATE TAB 325 MG (65 MG ELEMENTAL FE) 1 EACH: 325 (65 FE) TAB at 09:05

## 2024-05-06 RX ADMIN — SODIUM CHLORIDE, PRESERVATIVE FREE 10 ML: 5 INJECTION INTRAVENOUS at 06:05

## 2024-05-06 RX ADMIN — ONDANSETRON 4 MG: 4 TABLET, ORALLY DISINTEGRATING ORAL at 11:05

## 2024-05-06 RX ADMIN — DOCUSATE SODIUM 200 MG: 100 CAPSULE, LIQUID FILLED ORAL at 09:05

## 2024-05-06 NOTE — PROGRESS NOTES
PROGRESS NOTE  ANTEPARTUM    Admit Date: 4/16/2024   LOS: 20 days     Reason for Admission: Twin IUP with Previous demise of Twin A, PPROM Twin B    SUBJECTIVE:     Lily Mckeon is a 23 y.o. female at 23w6d who is here for PPROM twin B with previous demise of Twin A     Patient reports None contractions, active fetal movement, Scant but improved vaginal bleeding with recent speculum exam showing no cervical change, Yes loss of fluid.  Right occipital headache improved with acetaminophen.    Scheduled Meds:  Current Facility-Administered Medications   Medication Dose Route Frequency    docusate sodium  200 mg Oral Daily    ferrous sulfate  1 tablet Oral Daily    NIFEdipine  30 mg Oral Daily    polyethylene glycol  17 g Oral BID    prenatal vitamin  1 tablet Oral Daily    pyridoxine (vitamin B6)  50 mg Oral Daily    sodium chloride 0.9%  10 mL Intravenous Q6H     Continuous Infusions:  Current Facility-Administered Medications   Medication Dose Route Frequency Last Rate Last Admin     PRN Meds:  Current Facility-Administered Medications:     acetaminophen, 650 mg, Oral, Q6H PRN    calcium gluconate, 1 g, Intravenous, PRN    diphenhydrAMINE, 25 mg, Oral, Q4H PRN    diphenhydrAMINE, 25 mg, Intravenous, Q4H PRN    ondansetron, 4 mg, Oral, Q8H PRN    psyllium husk (with sugar), 1 packet, Oral, Daily PRN    senna-docusate 8.6-50 mg, 1 tablet, Oral, Nightly PRN    simethicone, 1 tablet, Oral, Q6H PRN    sodium chloride 0.9%, 10 mL, Intravenous, PRN    Flushing PICC/Midline Protocol, , , Until Discontinued **AND** sodium chloride 0.9%, 10 mL, Intravenous, Q6H **AND** sodium chloride 0.9%, 10 mL, Intravenous, PRN    Review of patient's allergies indicates:   Allergen Reactions    Pineapple Hives and Shortness Of Breath       OBJECTIVE:     Vital Signs (Most Recent)  Temp: 98.8 °F (37.1 °C) (05/06/24 0856)  Pulse: 100 (05/06/24 0921)  Resp: 16 (05/06/24 0856)  BP: 117/62 (05/06/24 0856)  SpO2: 99 % (05/06/24  0921)    Temperature Range Min/Max (Last 24H):  Temp:  [97.7 °F (36.5 °C)-98.9 °F (37.2 °C)]     Vital Signs Range (Last 24H):  Temp:  [97.7 °F (36.5 °C)-98.9 °F (37.2 °C)]   Pulse:  []   Resp:  [14-17]   BP: (107-125)/(61-77)   SpO2:  [98 %-100 %]     I & O (Last 24H):No intake or output data in the 24 hours ending 24 09    Physical Exam:  Abdomen: soft, non-tender fundus, non-distended; no masses  Extremities: no cyanosis or edema, or clubbing and Homans sign is negative, no sign of DVT    FHT: 140, mod variability, occ variable decels,  reassuring for GA                 [place]: No contractions     Laboratory:    Lab Results   Component Value Date    WBC 11.68 (H) 2024    HGB 10.4 (L) 2024    HCT 29.6 (L) 2024    MCV 86.0 2024     2024      U/S - Twin B breech, appropriate growth 24      ASSESSMENT/PLAN:     Active Hospital Problems    Diagnosis  POA    Continuing pregnancy after intrauterine death of one fetus or more, second trimester, fetus 1 [O31.22X1]  Yes     premature rupture of membranes (PPROM) with unknown onset of labor- Twin B [O42.919]  Yes    4. Chronic hypertension affecting pregnancy [O10.919]  Yes      Resolved Hospital Problems   No resolved problems to display.       Plan:  Continue inpatient care with delivery for maternal or fetal indications  MgSo4 fetal neuroprotection if delivery becomes imminent.

## 2024-05-06 NOTE — PROGRESS NOTES
Inpatient Nutrition Evaluation    Admit Date: 4/16/2024   Total duration of encounter: 20 days   Patient Age: 23 y.o.    Nutrition Recommendation/Prescription     Continue regular diet as tolerated  Continue antiemetic and bowel regimen  Monitor labs, intake and weight    Nutrition Assessment     Chart Review    Reason Seen: length of stay and follow-up    Malnutrition Screening Tool Results   Have you recently lost weight without trying?: No  Have you been eating poorly because of a decreased appetite?: No   MST Score: 0   Diagnosis:  PPROM in the setting of twin gestation with fetal demise of Twin A  Anemia   Abnormal glucose testing    Relevant Medical History: HTN    Scheduled Medications:  docusate sodium, 200 mg, Daily  ferrous sulfate, 1 tablet, Daily  NIFEdipine, 30 mg, Daily  polyethylene glycol, 17 g, BID  prenatal vitamin, 1 tablet, Daily  pyridoxine (vitamin B6), 50 mg, Daily  sodium chloride 0.9%, 10 mL, Q6H    Continuous Infusions:   PRN Medications:   Current Facility-Administered Medications:     acetaminophen, 650 mg, Oral, Q6H PRN    calcium gluconate, 1 g, Intravenous, PRN    diphenhydrAMINE, 25 mg, Oral, Q4H PRN    diphenhydrAMINE, 25 mg, Intravenous, Q4H PRN    ondansetron, 4 mg, Oral, Q8H PRN    psyllium husk (with sugar), 1 packet, Oral, Daily PRN    senna-docusate 8.6-50 mg, 1 tablet, Oral, Nightly PRN    simethicone, 1 tablet, Oral, Q6H PRN    sodium chloride 0.9%, 10 mL, Intravenous, PRN    Flushing PICC/Midline Protocol, , , Until Discontinued **AND** sodium chloride 0.9%, 10 mL, Intravenous, Q6H **AND** sodium chloride 0.9%, 10 mL, Intravenous, PRN    Recent Labs   Lab 05/01/24  0716 05/02/24  0818 05/04/24  0623   WBC 11.95* 12.20* 11.68*   HGB 10.0* 10.2* 10.4*   HCT 28.9* 29.3* 29.6*     Nutrition Orders:  Diet Adult Regular      Appetite/Oral Intake: good/% of meals  Factors Affecting Nutritional Intake: none identified  Food/Catholic/Cultural Preferences: unable to  "obtain  Food Allergies:  pineapple  Last Bowel Movement: 04/21/24  Wound(s):      Comments    4/22: Pt NPO this AM for 3 hr glucose test, diet advanced to regular this afternoon. Per notes, no N/V or abdominal pain, constipation resolved. No reports of decreased appetite. Per MST, no reports of decreased appetite or unintentional weight loss PTA. Weight appears stable PTA per EMR weight history. Recent 4# weight loss likely fluid loss.    4/29: Pt unable to answer questions due to language barrier. Nsg noted appetite good. Still with nausea in the mornings but zofran is helping. Bowel regimen is helping with constipation.     5/6: Pt tolerating diet well. Recommend to continue bowel regimen to assist with recent constipation.     Anthropometrics    Height: 5' 3" (160 cm), Height Method: Stated  Last Weight: 83.7 kg (184 lb 9.6 oz) (05/01/24 1243), Weight Method: Standard Scale  BMI (Calculated): 32.7  BMI Classification: not applicable-pregnancy     Ideal Body Weight (IBW), Female: 115 lb     % Ideal Body Weight, Female (lb): 160 %                             Usual Weight Provided By: EMR weight history    Wt Readings from Last 5 Encounters:   05/01/24 83.7 kg (184 lb 9.6 oz)   04/09/24 85.3 kg (188 lb)   04/08/24 85.3 kg (188 lb 0.8 oz)   03/26/24 84.5 kg (186 lb 6.4 oz)   03/19/24 84.9 kg (187 lb 4.5 oz)     Weight Change(s) Since Admission: small weight gain  Wt Readings from Last 1 Encounters:   05/01/24 1243 83.7 kg (184 lb 9.6 oz)   04/16/24 0900 83.5 kg (184 lb)   Admit Weight: 83.5 kg (184 lb) (04/16/24 0900), Weight Method: Standard Scale    Patient Education     Not applicable.    Nutrition Goals & Monitoring     Dietitian will monitor: food and beverage intake, weight, and gastrointestinal profile    Nutrition Risk/Follow-Up: low (follow-up in 5-7 days)  Patients assigned 'low nutrition risk' status do not qualify for a full nutritional assessment but will be monitored and re-evaluated in a 5-7 day time " period. Please consult if re-evaluation needed sooner.

## 2024-05-06 NOTE — PROGRESS NOTES
Progress Note  Maternal Fetal Medicine          Subjective:      Twin IUP at 23w6d -  (with known IUFD of Twin A since 16w EGA) who presented 24 for leaking/bleeding (oligohydramnios of twin B since early second trimester).     Small bleeding episodes this weekend. No abdominal pain now and bleeding has lightened. No pressure or tenderness.     Nursing staff without concerns.     Objective:         Temp:  [98.2 °F (36.8 °C)-98.9 °F (37.2 °C)] 98.8 °F (37.1 °C)  Pulse:  [] 97  Resp:  [14-16] 16  SpO2:  [98 %-100 %] 100 %  BP: (107-124)/(61-77) 117/62    Gen: NAD, A&Ox3  Pulm: Unlabored breathing, LCTAB  Card: RRR  Abd: FHT present, soft, nondistended, nontender to palpation, gravid uterus palpable c/w gestational age  Extremities: Palpable peripheral pulses, no pedal edema, 2+ DTRs x 4    NST: 145 baseline, min-moderate BTBV, pos accelerations, neg decelerations (occ mild variable decels) strip appropriate for gestational age  [place]: none  : Breech, FHTs+, Twin B EFW 479g, MVP 1.85cm, oligohydramnios.   : breech, posterior, LOLY 1.5cm      Lab Review  Blood Type: O POS    No results found for this or any previous visit (from the past 24 hour(s)).    Assessment:       23 y.o.  at 23w6d weeks gestation admitted for twin gestation with PPROM of twin B, vaginal bleeding, IUFD of Twin A diagnosed at 16w, cHTN, elevated msAFP     Active Hospital Problems    Diagnosis  POA    Continuing pregnancy after intrauterine death of one fetus or more, second trimester, fetus 1 [O31.22X1]  Yes     premature rupture of membranes (PPROM) with unknown onset of labor- Twin B [O42.919]  Yes    4. Chronic hypertension affecting pregnancy [O10.919]  Yes      Resolved Hospital Problems   No resolved problems to display.        Plan:     1. Twins - previable PPROM Twin B  Patient's evaluation and exam is consistent with previable PPROM (rupture of membranes prior to 22 weeks 6 days gestation). Patient was  counseled regarding the extremely poor prognosis of ruptured membranes at this early gestational age. We reviewed the maternal risks of previable premature rupture of membranes which include but are not limited to chorioamnionitis, endometritis, sepsis, potential need for hysterectomy and loss of fertility, VTE, and maternal death. We reviewed the fetal risks which include stillbirth, cord prolapse, infection, pulmonary hypoplasia, risk for musculoskeletal and facial anomalies due to severe oligohydramnios, and  death. We reviewed routine pulmonary maturation and the function of amniotic fluid in the normal growth of fetal lungs. We reviewed the unlikely chance of membranes resealing from spontaneous rupture. I counseled the patient regarding management options which prior to 22 weeks (ie gestational age < 21 weeks 6 days) include conservative management, induction of labor, or dilation and evacuation. At a gestational age of 22 weeks 0 days and greater, conservative management is recommended at [facility].  Known IUFD of Twin A - diagnosed at 16w (fetus was measuring 13w6d at that time)     Recommendations:  - s/p Latency antibiotics  - Monitor for s/s infection, abruption. Currently afebrile. No evidence of leukocytosis- weekly CBC planned  - EFW q3 weeks (completed 24). No evidence of placenta previa on most recent ultrasound.  - Once weekly AFV/presentation, ().  - Prior to PPROM and this hospital admission, we had extensively discussed the high risk for fetal morbidity/mortality given Twin B's severe oligohydramnios since 16w and significantly elevated AFP. These risks are compounded by PPROM and we discussed this even further today utilizing translating services with her significant other present in the room. They were without questions and verbalized understanding.   - VTE prophylaxis (SCDs ordered) - compliance encouraged  - Considered fetal echo since we have been unable to confidently  identify chorionicity of twin gestation as well as diagnosis of IUFD of Twin A, however, we believe this attempt would be futile as imaging has always been extremely limited due to chronic, severe oligohydramnios in addition to high risk of fetal/ morbidity/mortality. Recommend  evaluation if delivers at a viable gestation and resuscitative attempts are successful.   - - ANCS - and 12 hrs magnesium sulfate  - S/p NICU consult on      2. cHTN  - Continue home med of Nifedipine 30mg XL daily.  - Monitor Bps as ordered.  - If she has a mild headache, may administer Tylenol PRN.     3. BMI 32.59  -Complicates all aspects of care     4. Constipation  - Colace 200mg daily  - Miralax BID     5. Nausea  - Consistent with pregnancy type symptoms.   - ordered Vitamin B6  - Zofran PRN     6. Abnormal OGTT  - Failed 1 hr ogtt on  (early)  - Passed early 3hr OGTT  - Plan will be repeat glucose test between 24-28 weeks (at least 1 week after steroids). OK to skip glucola and go to 3 HR if patient desires.       Thank you for allowing us to participate in the care of your patient. If you have any questions/concerns, please do not hesitate to contact us.     Michelle Calvin MD  Maternal Fetal Medicine

## 2024-05-06 NOTE — PROGRESS NOTES
used to speak with patient at bedside. Patient sitting up in bedside chair eating breakfast. Instructed to call when finished eating to be placed on the monitor and to perform assessment. Patient verbalized understanding.

## 2024-05-07 LAB
BASOPHILS # BLD AUTO: 0.03 X10(3)/MCL
BASOPHILS NFR BLD AUTO: 0.3 %
EOSINOPHIL # BLD AUTO: 0.27 X10(3)/MCL (ref 0–0.9)
EOSINOPHIL NFR BLD AUTO: 2.8 %
ERYTHROCYTE [DISTWIDTH] IN BLOOD BY AUTOMATED COUNT: 12.9 % (ref 11.5–17)
GROUP & RH: NORMAL
HCT VFR BLD AUTO: 30.3 % (ref 37–47)
HGB BLD-MCNC: 10.3 G/DL (ref 12–16)
IMM GRANULOCYTES # BLD AUTO: 0.05 X10(3)/MCL (ref 0–0.04)
IMM GRANULOCYTES NFR BLD AUTO: 0.5 %
INDIRECT COOMBS: NORMAL
LYMPHOCYTES # BLD AUTO: 2.78 X10(3)/MCL (ref 0.6–4.6)
LYMPHOCYTES NFR BLD AUTO: 28.7 %
MCH RBC QN AUTO: 29.7 PG (ref 27–31)
MCHC RBC AUTO-ENTMCNC: 34 G/DL (ref 33–36)
MCV RBC AUTO: 87.3 FL (ref 80–94)
MONOCYTES # BLD AUTO: 0.54 X10(3)/MCL (ref 0.1–1.3)
MONOCYTES NFR BLD AUTO: 5.6 %
NEUTROPHILS # BLD AUTO: 6 X10(3)/MCL (ref 2.1–9.2)
NEUTROPHILS NFR BLD AUTO: 62.1 %
NRBC BLD AUTO-RTO: 0 %
PLATELET # BLD AUTO: 294 X10(3)/MCL (ref 130–400)
PMV BLD AUTO: 8.7 FL (ref 7.4–10.4)
RBC # BLD AUTO: 3.47 X10(6)/MCL (ref 4.2–5.4)
SPECIMEN OUTDATE: NORMAL
WBC # SPEC AUTO: 9.67 X10(3)/MCL (ref 4.5–11.5)

## 2024-05-07 PROCEDURE — 25000003 PHARM REV CODE 250

## 2024-05-07 PROCEDURE — 11000001 HC ACUTE MED/SURG PRIVATE ROOM

## 2024-05-07 PROCEDURE — A4216 STERILE WATER/SALINE, 10 ML: HCPCS | Performed by: NURSE PRACTITIONER

## 2024-05-07 PROCEDURE — 25000003 PHARM REV CODE 250: Performed by: NURSE PRACTITIONER

## 2024-05-07 PROCEDURE — 86901 BLOOD TYPING SEROLOGIC RH(D): CPT

## 2024-05-07 PROCEDURE — 25000003 PHARM REV CODE 250: Performed by: STUDENT IN AN ORGANIZED HEALTH CARE EDUCATION/TRAINING PROGRAM

## 2024-05-07 PROCEDURE — 99233 SBSQ HOSP IP/OBS HIGH 50: CPT | Mod: ,,, | Performed by: NURSE PRACTITIONER

## 2024-05-07 PROCEDURE — 25000003 PHARM REV CODE 250: Performed by: OBSTETRICS & GYNECOLOGY

## 2024-05-07 PROCEDURE — 85025 COMPLETE CBC W/AUTO DIFF WBC: CPT

## 2024-05-07 RX ADMIN — DOCUSATE SODIUM 200 MG: 100 CAPSULE, LIQUID FILLED ORAL at 08:05

## 2024-05-07 RX ADMIN — POLYETHYLENE GLYCOL 3350 17 G: 17 POWDER, FOR SOLUTION ORAL at 08:05

## 2024-05-07 RX ADMIN — SODIUM CHLORIDE, PRESERVATIVE FREE 10 ML: 5 INJECTION INTRAVENOUS at 07:05

## 2024-05-07 RX ADMIN — SODIUM CHLORIDE, PRESERVATIVE FREE 10 ML: 5 INJECTION INTRAVENOUS at 12:05

## 2024-05-07 RX ADMIN — NIFEDIPINE 30 MG: 30 TABLET, FILM COATED, EXTENDED RELEASE ORAL at 08:05

## 2024-05-07 RX ADMIN — SODIUM CHLORIDE, PRESERVATIVE FREE 10 ML: 5 INJECTION INTRAVENOUS at 11:05

## 2024-05-07 RX ADMIN — PYRIDOXINE HCL TAB 50 MG 50 MG: 50 TAB at 08:05

## 2024-05-07 RX ADMIN — SODIUM CHLORIDE, PRESERVATIVE FREE 10 ML: 5 INJECTION INTRAVENOUS at 05:05

## 2024-05-07 RX ADMIN — ONDANSETRON 4 MG: 4 TABLET, ORALLY DISINTEGRATING ORAL at 07:05

## 2024-05-07 RX ADMIN — FERROUS SULFATE TAB 325 MG (65 MG ELEMENTAL FE) 1 EACH: 325 (65 FE) TAB at 08:05

## 2024-05-07 RX ADMIN — PRENATAL VITAMINS-IRON FUMARATE 27 MG IRON-FOLIC ACID 0.8 MG TABLET 1 TABLET: at 08:05

## 2024-05-07 NOTE — PROGRESS NOTES
Progress Note  Maternal Fetal Medicine          Subjective:      IUP at 24w0d - (with known IUFD of Twin A since 16w EGA) who presented 4/16/24 for leaking/bleeding (oligohydramnios of twin B since early second trimester).     She reports sleeping well last night and is without complaints aside from usual early AM nausea (received Zofran). She specifically denies abd pain/contractions, foul odor, malaise, chills. Afebrile. Had two episodes of small amt of brown spotting with small amt of clear fluid noted on peripad (pictures provided by patient on phone).      Nursing staff without concerns.     Objective:         Temp:  [98.1 °F (36.7 °C)-98.9 °F (37.2 °C)] 98.2 °F (36.8 °C)  Pulse:  [] 83  Resp:  [16-18] 16  SpO2:  [99 %-100 %] 99 %  BP: (107-130)/(61-73) 107/61    Gen: NAD, A&Ox3  Pulm: Unlabored breathing, LCTAB  Card: RRR  Abd: FHT present, soft, nondistended, nontender to palpation, gravid uterus palpable c/w gestational age  Extremities: Palpable peripheral pulses, no pedal edema, 2+ DTRs x 4    NST (last night; due this AM): 130 baseline, moderate BTBV, pos accelerations, neg decelerations    [place]: none  4/23: Breech, FHTs+, Twin B EFW 479g, MVP 1.85cm, oligohydramnios.   4/30: breech, posterior, LOLY 1.5cm    Lab Review  Blood Type: O POS    Recent Results (from the past 24 hour(s))   Type & Screen    Collection Time: 05/07/24  5:51 AM   Result Value Ref Range    Group & Rh O POS     Indirect Martir GEL NEG     Specimen Outdate 05/10/2024 23:59    CBC with Differential    Collection Time: 05/07/24  5:51 AM   Result Value Ref Range    WBC 9.67 4.50 - 11.50 x10(3)/mcL    RBC 3.47 (L) 4.20 - 5.40 x10(6)/mcL    Hgb 10.3 (L) 12.0 - 16.0 g/dL    Hct 30.3 (L) 37.0 - 47.0 %    MCV 87.3 80.0 - 94.0 fL    MCH 29.7 27.0 - 31.0 pg    MCHC 34.0 33.0 - 36.0 g/dL    RDW 12.9 11.5 - 17.0 %    Platelet 294 130 - 400 x10(3)/mcL    MPV 8.7 7.4 - 10.4 fL    Neut % 62.1 %    Lymph % 28.7 %    Mono % 5.6 %    Eos % 2.8 %     Basophil % 0.3 %    Lymph # 2.78 0.6 - 4.6 x10(3)/mcL    Neut # 6.00 2.1 - 9.2 x10(3)/mcL    Mono # 0.54 0.1 - 1.3 x10(3)/mcL    Eos # 0.27 0 - 0.9 x10(3)/mcL    Baso # 0.03 <=0.2 x10(3)/mcL    IG# 0.05 (H) 0 - 0.04 x10(3)/mcL    IG% 0.5 %    NRBC% 0.0 %       Assessment:       23 y.o.  at 24w0d weeks gestation admitted for  twin gestation with PPROM of twin B, vaginal bleeding, IUFD of Twin A diagnosed at 16w, cHTN, elevated msAFP     Active Hospital Problems    Diagnosis  POA    Continuing pregnancy after intrauterine death of one fetus or more, second trimester, fetus 1 [O31.22X1]  Yes     premature rupture of membranes (PPROM) with unknown onset of labor- Twin B [O42.919]  Yes    4. Chronic hypertension affecting pregnancy [O10.919]  Yes      Resolved Hospital Problems   No resolved problems to display.        Plan:     1. Twins - previable PPROM Twin B  Patient's evaluation and exam is consistent with previable PPROM (rupture of membranes prior to 22 weeks 6 days gestation). Patient was counseled regarding the extremely poor prognosis of ruptured membranes at this early gestational age. We reviewed the maternal risks of previable premature rupture of membranes which include but are not limited to chorioamnionitis, endometritis, sepsis, potential need for hysterectomy and loss of fertility, VTE, and maternal death. We reviewed the fetal risks which include stillbirth, cord prolapse, infection, pulmonary hypoplasia, risk for musculoskeletal and facial anomalies due to severe oligohydramnios, and  death. We reviewed routine pulmonary maturation and the function of amniotic fluid in the normal growth of fetal lungs. We reviewed the unlikely chance of membranes resealing from spontaneous rupture. I counseled the patient regarding management options which prior to 22 weeks (ie gestational age < 21 weeks 6 days) include conservative management, induction of labor, or dilation and evacuation.  At a gestational age of 22 weeks 0 days and greater, conservative management is recommended at [facility].  Known IUFD of Twin A - diagnosed at 16w (fetus was measuring 13w6d at that time)     Recommendations:  - s/p Latency antibiotics  - Monitor for s/s infection, abruption. Currently afebrile. No evidence of leukocytosis- weekly CBC planned  - EFW q3 weeks (completed 24). No evidence of placenta previa on most recent ultrasound.  - Once weekly AFV/presentation, ().  - Prior to PPROM and this hospital admission, we had extensively discussed the high risk for fetal morbidity/mortality given Twin B's severe oligohydramnios since 16w and significantly elevated AFP. These risks are compounded by PPROM and we discussed this even further today utilizing translating services with her significant other present in the room. They were without questions and verbalized understanding.   - VTE prophylaxis (SCDs ordered) - compliance encouraged  - Considered fetal echo since we have been unable to confidently identify chorionicity of twin gestation as well as diagnosis of IUFD of Twin A, however, we believe this attempt would be futile as imaging has always been extremely limited due to chronic, severe oligohydramnios in addition to high risk of fetal/ morbidity/mortality. Recommend  evaluation if delivers at a viable gestation and resuscitative attempts are successful.   - - ANCS - and 12 hrs magnesium sulfate  - S/p NICU consult on      2. cHTN  - Continue home med of Nifedipine 30mg XL daily.  - Monitor Bps as ordered.  - If she has a mild headache, may administer Tylenol PRN.     3. BMI 32.59  -Complicates all aspects of care     4. Constipation  - Colace 200mg daily  - Miralax BID     5. Nausea  - Consistent with pregnancy type symptoms.   - ordered Vitamin B6  - Zofran PRN     6. Abnormal OGTT  - Failed 1 hr ogtt on  (early)  - Passed early 3hr OGTT  - Plan will be repeat  glucose test between 24-28 weeks (at least 1 week after steroids). OK to skip glucola and go to 3 HR if patient desires.          Thank you for allowing us to participate in the care of your patient. If you have any questions/concerns, please do not hesitate to contact us.     Randi Cooper, MSN, APRN, [facility]  Maternal Fetal Medicine

## 2024-05-07 NOTE — PROGRESS NOTES
PROGRESS NOTE  ANTEPARTUM    Admit Date: 4/16/2024   LOS: 21 days     Reason for Admission: Twin IUP with Previous demise of Twin A, PPROM Twin B    SUBJECTIVE:     Lily Mckeon is a 23 y.o. female at 24w0d who is here for PPROM twin B with previous demise of Twin A     Patient reports None contractions, active fetal movement, Scant but improved vaginal bleeding with recent speculum exam showing no cervical change, Yes loss of fluid.      Scheduled Meds:   docusate sodium  200 mg Oral Daily    ferrous sulfate  1 tablet Oral Daily    NIFEdipine  30 mg Oral Daily    polyethylene glycol  17 g Oral BID    prenatal vitamin  1 tablet Oral Daily    pyridoxine (vitamin B6)  50 mg Oral Daily    sodium chloride 0.9%  10 mL Intravenous Q6H     Continuous Infusions:      PRN Meds:  Current Facility-Administered Medications:     acetaminophen, 650 mg, Oral, Q6H PRN    calcium gluconate, 1 g, Intravenous, PRN    diphenhydrAMINE, 25 mg, Oral, Q4H PRN    diphenhydrAMINE, 25 mg, Intravenous, Q4H PRN    ondansetron, 4 mg, Oral, Q8H PRN    psyllium husk (with sugar), 1 packet, Oral, Daily PRN    senna-docusate 8.6-50 mg, 1 tablet, Oral, Nightly PRN    simethicone, 1 tablet, Oral, Q6H PRN    sodium chloride 0.9%, 10 mL, Intravenous, PRN    Flushing PICC/Midline Protocol, , , Until Discontinued **AND** sodium chloride 0.9%, 10 mL, Intravenous, Q6H **AND** sodium chloride 0.9%, 10 mL, Intravenous, PRN    Review of patient's allergies indicates:   Allergen Reactions    Pineapple Hives and Shortness Of Breath       OBJECTIVE:     Vital Signs (Most Recent)  Temp: 98.4 °F (36.9 °C) (05/07/24 0847)  Pulse: 100 (05/07/24 0951)  Resp: 16 (05/07/24 0847)  BP: 111/62 (05/07/24 0847)  SpO2: 99 % (05/07/24 0951)    Temperature Range Min/Max (Last 24H):  Temp:  [98.1 °F (36.7 °C)-98.9 °F (37.2 °C)]     Vital Signs Range (Last 24H):  Temp:  [98.1 °F (36.7 °C)-98.9 °F (37.2 °C)]   Pulse:  []   Resp:  [16-18]   BP: (107-130)/(61-73)    SpO2:  [99 %]     I & O (Last 24H):No intake or output data in the 24 hours ending 24 1110    Physical Exam:  Abdomen: soft, non-tender fundus, non-distended; no masses  Extremities: no cyanosis or edema, or clubbing and Homans sign is negative, no sign of DVT    FHT: 140, mod variability, occ variable decels,  reassuring for GA                 TOCO: No contractions     Laboratory:    Lab Results   Component Value Date    WBC 9.67 2024    HGB 10.3 (L) 2024    HCT 30.3 (L) 2024    MCV 87.3 2024     2024      U/S - Twin B breech, appropriate growth 24      ASSESSMENT/PLAN:     Active Hospital Problems    Diagnosis  POA    Continuing pregnancy after intrauterine death of one fetus or more, second trimester, fetus 1 [O31.22X1]  Yes     premature rupture of membranes (PPROM) with unknown onset of labor- Twin B [O42.919]  Yes    4. Chronic hypertension affecting pregnancy [O10.919]  Yes      Resolved Hospital Problems   No resolved problems to display.       Plan:  Continue inpatient care with delivery for maternal or fetal indications  MgSo4 fetal neuroprotection if delivery becomes imminent.

## 2024-05-08 LAB
GLUCOSE 1H P 100 G GLC PO SERPL-MCNC: 86 MG/DL (ref 100–180)
GLUCOSE 2H P 100 G GLC PO SERPL-MCNC: 85 MG/DL (ref 70–140)
GLUCOSE 3H P 100 G GLC PO SERPL-MCNC: 82 MG/DL (ref 70–115)
GLUCOSE P FAST SERPL-MCNC: 86 MG/DL (ref 70–100)
PATH REV: NORMAL

## 2024-05-08 PROCEDURE — 82950 GLUCOSE TEST: CPT | Performed by: OBSTETRICS & GYNECOLOGY

## 2024-05-08 PROCEDURE — 25000003 PHARM REV CODE 250: Performed by: NURSE PRACTITIONER

## 2024-05-08 PROCEDURE — 99233 SBSQ HOSP IP/OBS HIGH 50: CPT | Mod: ,,, | Performed by: NURSE PRACTITIONER

## 2024-05-08 PROCEDURE — 11000001 HC ACUTE MED/SURG PRIVATE ROOM

## 2024-05-08 PROCEDURE — 82951 GLUCOSE TOLERANCE TEST (GTT): CPT | Performed by: OBSTETRICS & GYNECOLOGY

## 2024-05-08 PROCEDURE — 82952 GTT-ADDED SAMPLES: CPT | Performed by: OBSTETRICS & GYNECOLOGY

## 2024-05-08 PROCEDURE — 82947 ASSAY GLUCOSE BLOOD QUANT: CPT | Performed by: OBSTETRICS & GYNECOLOGY

## 2024-05-08 PROCEDURE — 25000003 PHARM REV CODE 250

## 2024-05-08 PROCEDURE — 25000003 PHARM REV CODE 250: Performed by: STUDENT IN AN ORGANIZED HEALTH CARE EDUCATION/TRAINING PROGRAM

## 2024-05-08 PROCEDURE — 25000003 PHARM REV CODE 250: Performed by: OBSTETRICS & GYNECOLOGY

## 2024-05-08 PROCEDURE — A4216 STERILE WATER/SALINE, 10 ML: HCPCS | Performed by: NURSE PRACTITIONER

## 2024-05-08 RX ADMIN — SODIUM CHLORIDE, PRESERVATIVE FREE 10 ML: 5 INJECTION INTRAVENOUS at 11:05

## 2024-05-08 RX ADMIN — PYRIDOXINE HCL TAB 50 MG 50 MG: 50 TAB at 02:05

## 2024-05-08 RX ADMIN — POLYETHYLENE GLYCOL 3350 17 G: 17 POWDER, FOR SOLUTION ORAL at 02:05

## 2024-05-08 RX ADMIN — ONDANSETRON 4 MG: 4 TABLET, ORALLY DISINTEGRATING ORAL at 01:05

## 2024-05-08 RX ADMIN — DOCUSATE SODIUM 200 MG: 100 CAPSULE, LIQUID FILLED ORAL at 02:05

## 2024-05-08 RX ADMIN — PRENATAL VITAMINS-IRON FUMARATE 27 MG IRON-FOLIC ACID 0.8 MG TABLET 1 TABLET: at 02:05

## 2024-05-08 RX ADMIN — NIFEDIPINE 30 MG: 30 TABLET, FILM COATED, EXTENDED RELEASE ORAL at 09:05

## 2024-05-08 RX ADMIN — SODIUM CHLORIDE, PRESERVATIVE FREE 10 ML: 5 INJECTION INTRAVENOUS at 05:05

## 2024-05-08 RX ADMIN — FERROUS SULFATE TAB 325 MG (65 MG ELEMENTAL FE) 1 EACH: 325 (65 FE) TAB at 02:05

## 2024-05-08 RX ADMIN — SODIUM CHLORIDE, PRESERVATIVE FREE 10 ML: 5 INJECTION INTRAVENOUS at 12:05

## 2024-05-08 NOTE — PROGRESS NOTES
Antepartum Progress Note        Subjective:      Lily Mckeon is a 23 y.o.  @24w1d who is admitted for management of PPROM in the setting of twin gestation with fetal demise of Twin ALissette ISLAS. Reports no bleeding, leaking fluid, or abdominal pain overnight. Reports positive fetal movement. Denies RUQ pain, LOF, blurry vision, HA, dizziness, CP, SOB, N/V, or calf pain.    Objective:      Temp:  [97.7 °F (36.5 °C)-99.1 °F (37.3 °C)] 98.5 °F (36.9 °C)  Pulse:  [] 99  Resp:  [14-18] 16  BP: (107-129)/(62-77) 122/77  Body mass index is 32.7 kg/m².    General: no acute distress, sitting bedside  Resp: No increased WOB  CV: regular rate  Abd: soft, nt, nd, gravid  MSK: R brachial PICC line   Ext: No erythema or calf tenderness     Growth imaging Dr. Calvin:  Twin B is breech.    bpm .   Posterior placenta    grams   MVP 1.85 cm      US: Breech, posterior, LOLY 1.5 cm.     US: Vertex, posterior, LOLY 1.44 cm.    Assessment/Plan     22 y/o  @ 24w1d admitted for management of PPROM of twin B, vaginal bleeding, IUFD of twin A diagnosed at 16 weeks, [place].    Group & Rh   Date Value Ref Range Status   2024 O POS  Final     # PPROM  - Positive ROM +  - S/p latency antibiotics:  S/p azithromycin 1 g on day 1 and day 5  S/p ampicillin IV 2 grams q 6 hours for 48 hours  S/p amoxicillin 500 mg TID  - GC/ CT : negative  - GBS: negative  - Wet prep: negative  - In house until delivery at 34 weeks  - S/p BMZ x2 doses and 12 hour course of magnesium sulfate.  - Fetal monitoring with NSTs 1h BID per [facility] recs.  - Now s/p NICU consult on .  - Will continue to monitor vaginal bleeding. If patient complaints of abdominal pain, place on continuous tocometer.  - : vaginal bleeding, speculum exam reassuring. MFM aware.      # Chronic HTN  - continue daily Procardia 30 XL  - ASA 81 daily  - will continue to monitor blood pressures     # Mono-Di twins with retained fetal demise of  Twin A  - NSTs 1 hr BID  - PNV daily    #Glucose testing   - 1 hr GTT O'Luu 190  - 3 hr glucose normal   - Plan for repeat 3 hr OGTT today, will follow up results.    #Anemia   - H/H 10.3/30.3 on 5/7.  - Continue PO iron.    Discussed with staff.    Wicho Teixeira MD  [facility] [facility], PGY-2

## 2024-05-08 NOTE — PROGRESS NOTES
Progress Note  Maternal Fetal Medicine          Subjective:      IUP at 24w1d - (with known IUFD of Twin A since 16w EGA) who presented 24 for leaking/bleeding (oligohydramnios of twin B since early second trimester).     She reports sleeping well last night and is without complaints.   She specifically denies abd pain/contractions, foul odor, malaise, chills. Afebrile. Denies any episodes of leaking or bleeding for the 24h.   Plan for repeat glucose testing now that she's 24w today. Fasting currently.  Routine ultrasound yesterday.     Nursing staff without concerns.     Objective:         Temp:  [97.7 °F (36.5 °C)-99.1 °F (37.3 °C)] 98.5 °F (36.9 °C)  Pulse:  [] 99  Resp:  [14-18] 16  SpO2:  [99 %] 99 %  BP: (107-129)/(62-77) 122/77    Gen: NAD, A&Ox3  Pulm: Unlabored breathing, LCTAB  Card: RRR  Abd: FHT present, soft, nondistended, nontender to palpation, gravid uterus palpable c/w gestational age  Extremities: Palpable peripheral pulses, no pedal edema, 2+ DTRs x 4    NST (last night; due this AM): 135 baseline, moderate BTBV, pos accelerations, neg decelerations    [place]: none  : Breech, FHTs+, Twin B EFW 479g, MVP 1.85cm, oligohydramnios.   24: vertex, posterior placenta, LOLY 1.44cm    Lab Review  Blood Type: O POS    No results found for this or any previous visit (from the past 24 hour(s)).      Assessment:       23 y.o.  at 24w1d weeks gestation admitted for  twin gestation with PPROM of twin B, vaginal bleeding, IUFD of Twin A diagnosed at 16w, cHTN, elevated msAFP     Active Hospital Problems    Diagnosis  POA    Continuing pregnancy after intrauterine death of one fetus or more, second trimester, fetus 1 [O31.22X1]  Yes     premature rupture of membranes (PPROM) with unknown onset of labor- Twin B [O42.919]  Yes    4. Chronic hypertension affecting pregnancy [O10.919]  Yes      Resolved Hospital Problems   No resolved problems to display.        Plan:     1. Twins -  previable PPROM Twin B  Patient's evaluation and exam is consistent with previable PPROM (rupture of membranes prior to 22 weeks 6 days gestation). Patient was counseled regarding the extremely poor prognosis of ruptured membranes at this early gestational age. We reviewed the maternal risks of previable premature rupture of membranes which include but are not limited to chorioamnionitis, endometritis, sepsis, potential need for hysterectomy and loss of fertility, VTE, and maternal death. We reviewed the fetal risks which include stillbirth, cord prolapse, infection, pulmonary hypoplasia, risk for musculoskeletal and facial anomalies due to severe oligohydramnios, and  death. We reviewed routine pulmonary maturation and the function of amniotic fluid in the normal growth of fetal lungs. We reviewed the unlikely chance of membranes resealing from spontaneous rupture. I counseled the patient regarding management options which prior to 22 weeks (ie gestational age < 21 weeks 6 days) include conservative management, induction of labor, or dilation and evacuation. At a gestational age of 22 weeks 0 days and greater, conservative management is recommended at [facility].  Known IUFD of Twin A - diagnosed at 16w (fetus was measuring 13w6d at that time)     Recommendations:  - s/p Latency antibiotics  - Monitor for s/s infection, abruption. Currently afebrile. No evidence of leukocytosis- weekly CBC planned  - EFW q3 weeks (completed 24). No evidence of placenta previa on most recent ultrasound.  - Once weekly AFV/presentation, ().  - Prior to PPROM and this hospital admission, we had extensively discussed the high risk for fetal morbidity/mortality given Twin B's severe oligohydramnios since 16w and significantly elevated AFP. These risks are compounded by PPROM and we discussed this even further today utilizing translating services with her significant other present in the room. They were without  questions and verbalized understanding.   - VTE prophylaxis (SCDs ordered) - compliance encouraged  - Considered fetal echo since we have been unable to confidently identify chorionicity of twin gestation as well as diagnosis of IUFD of Twin A, however, we believe this attempt would be futile as imaging has always been extremely limited due to chronic, severe oligohydramnios in addition to high risk of fetal/ morbidity/mortality. Recommend  evaluation if delivers at a viable gestation and resuscitative attempts are successful.   - - ANCS - and 12 hrs magnesium sulfate  - S/p NICU consult on      2. cHTN  - Continue home med of Nifedipine 30mg XL daily.  - Monitor Bps as ordered.  - If she has a mild headache, may administer Tylenol PRN.     3. BMI 32.59  -Complicates all aspects of care     4. Constipation  - Colace 200mg daily  - Miralax BID     5. Nausea  - Consistent with pregnancy type symptoms.   - ordered Vitamin B6  - Zofran PRN     6. Abnormal OGTT  - Failed 1 hr ogtt on  (early)  - Passed early 3hr OGTT  - Plan will be repeat glucose test between 24-28 weeks (at least 1 week after steroids). OK to skip glucola and go to 3 HR if patient desires. 24- 3h OGTT planned for this AM.         Thank you for allowing us to participate in the care of your patient. If you have any questions/concerns, please do not hesitate to contact us.     Randi Cooper, MSN, APRN, WHNP-BC  Maternal Fetal Medicine

## 2024-05-08 NOTE — NURSING
Verbal order from Dr. ANA Montesinos to hold the morning medications except BP meds & give other medications after glucose test completed and patient has eaten.

## 2024-05-09 PROCEDURE — 25000003 PHARM REV CODE 250: Performed by: STUDENT IN AN ORGANIZED HEALTH CARE EDUCATION/TRAINING PROGRAM

## 2024-05-09 PROCEDURE — 25000003 PHARM REV CODE 250: Performed by: OBSTETRICS & GYNECOLOGY

## 2024-05-09 PROCEDURE — 11000001 HC ACUTE MED/SURG PRIVATE ROOM

## 2024-05-09 PROCEDURE — A4216 STERILE WATER/SALINE, 10 ML: HCPCS | Performed by: NURSE PRACTITIONER

## 2024-05-09 PROCEDURE — 25000003 PHARM REV CODE 250: Performed by: NURSE PRACTITIONER

## 2024-05-09 PROCEDURE — 99232 SBSQ HOSP IP/OBS MODERATE 35: CPT | Mod: ,,, | Performed by: OBSTETRICS & GYNECOLOGY

## 2024-05-09 PROCEDURE — 25000003 PHARM REV CODE 250

## 2024-05-09 RX ADMIN — SODIUM CHLORIDE, PRESERVATIVE FREE 10 ML: 5 INJECTION INTRAVENOUS at 12:05

## 2024-05-09 RX ADMIN — ONDANSETRON 4 MG: 4 TABLET, ORALLY DISINTEGRATING ORAL at 07:05

## 2024-05-09 RX ADMIN — NIFEDIPINE 30 MG: 30 TABLET, FILM COATED, EXTENDED RELEASE ORAL at 08:05

## 2024-05-09 RX ADMIN — DOCUSATE SODIUM 200 MG: 100 CAPSULE, LIQUID FILLED ORAL at 08:05

## 2024-05-09 RX ADMIN — PRENATAL VITAMINS-IRON FUMARATE 27 MG IRON-FOLIC ACID 0.8 MG TABLET 1 TABLET: at 08:05

## 2024-05-09 RX ADMIN — FERROUS SULFATE TAB 325 MG (65 MG ELEMENTAL FE) 1 EACH: 325 (65 FE) TAB at 08:05

## 2024-05-09 RX ADMIN — POLYETHYLENE GLYCOL 3350 17 G: 17 POWDER, FOR SOLUTION ORAL at 09:05

## 2024-05-09 RX ADMIN — SODIUM CHLORIDE, PRESERVATIVE FREE 10 ML: 5 INJECTION INTRAVENOUS at 05:05

## 2024-05-09 RX ADMIN — SODIUM CHLORIDE, PRESERVATIVE FREE 10 ML: 5 INJECTION INTRAVENOUS at 06:05

## 2024-05-09 RX ADMIN — SODIUM CHLORIDE, PRESERVATIVE FREE 10 ML: 5 INJECTION INTRAVENOUS at 11:05

## 2024-05-09 RX ADMIN — PYRIDOXINE HCL TAB 50 MG 50 MG: 50 TAB at 08:05

## 2024-05-09 RX ADMIN — POLYETHYLENE GLYCOL 3350 17 G: 17 POWDER, FOR SOLUTION ORAL at 08:05

## 2024-05-09 NOTE — PROGRESS NOTES
Antepartum Progress Note        Subjective:     Lily Mckeon is a 23 y.o.  @24w2d who is admitted for management of PPROM in the setting of twin gestation with fetal demise of Twin A.    Reports passage of small blood clot on urination this morning. Denies leaking fluid or abdominal pain overnight. Reports positive fetal movement. Denies RUQ pain, LOF, blurry vision, HA, dizziness, CP, SOB, N/V, or calf pain.    Objective:      Temp:  [98.1 °F (36.7 °C)-98.9 °F (37.2 °C)] 98.9 °F (37.2 °C)  Pulse:  [] 94  Resp:  [14-16] 16  SpO2:  [98 %-99 %] 98 %  BP: (101-126)/(58-76) 126/58  Body mass index is 32.7 kg/m².    General: no acute distress, sitting bedside  Resp: No increased WOB  CV: regular rate  Abd: soft, nt, nd, gravid  MSK: R brachial PICC line   Ext: No erythema or calf tenderness     Growth imaging Dr. Calvin:  Twin B is breech.    bpm .   Posterior placenta    grams   MVP 1.85 cm      US: Breech, posterior, LOLY 1.5 cm.     US: Vertex, posterior, LOLY 1.44 cm.    Assessment/Plan     24 y/o  @ 24w2d admitted for management of PPROM of twin B, vaginal bleeding, IUFD of twin A diagnosed at 16 weeks, cHTN.    Group & Rh   Date Value Ref Range Status   2024 O POS  Final     # PPROM  - Positive ROM +  - S/p latency antibiotics:  S/p azithromycin 1 g on day 1 and day 5  S/p ampicillin IV 2 grams q 6 hours for 48 hours  S/p amoxicillin 500 mg TID  - GC/ CT : negative  - GBS: negative  - Wet prep: negative  - In house until delivery at 34 weeks  - S/p BMZ x2 doses and 12 hour course of magnesium sulfate.  - Fetal monitoring with NSTs 1h BID per M recs.  - Now s/p NICU consult on .  - Will continue to monitor vaginal bleeding. If patient complaints of abdominal pain, place on continuous tocometer.  - : vaginal bleeding, speculum exam reassuring. MFM aware.  - : reports passage of small clot, no active bleeding thereafter. Will continue to  monitor.    # Chronic HTN  - continue daily Procardia 30 XL  - ASA 81 daily  - will continue to monitor blood pressures     # Mono-Di twins with retained fetal demise of Twin A  - NSTs 1 hr BID  - PNV daily    #Glucose testing   - 1 hr GTT O'Luu 190  - Early 3 hr glucose normal   - Repeat 3 hr OGTT done 5/8, normal.    #Anemia   - H/H 10.3/30.3 on 5/7.  - Continue PO iron.    Discussed with staff.    Wicho Teixeira MD  [facility], PGY-2

## 2024-05-09 NOTE — PROGRESS NOTES
Progress Note  Maternal Fetal Medicine          Subjective:      IUP at 24w2d - (with known IUFD of Twin A since 16w EGA) who presented 24 for leaking/bleeding (oligohydramnios of twin B since early second trimester).     Doing well overnight. She had a blood clot in the toilet at 3am when she got up and she showed me a picture. It is difficult to determine the size on the picture (golf ball size?). No ctx or other changes. No active bleeding now.     Nursing staff without concerns.     Objective:         Temp:  [98.1 °F (36.7 °C)-98.9 °F (37.2 °C)] 98.9 °F (37.2 °C)  Pulse:  [] 94  Resp:  [14-16] 16  SpO2:  [98 %-99 %] 98 %  BP: (101-126)/(58-76) 126/58    Gen: NAD, A&Ox3  Pulm: Unlabored breathing, LCTAB  Card: RRR  Abd: FHT present, soft, nondistended, nontender to palpation, gravid uterus palpable c/w gestational age  Extremities: Palpable peripheral pulses, no pedal edema, 2+ DTRs x 4    NST (last night; due this AM): 135 baseline, moderate BTBV, pos accelerations, neg decelerations    [place]: none  : Breech, FHTs+, Twin B EFW 479g, MVP 1.85cm, oligohydramnios.   24: vertex, posterior placenta, LOLY 1.44cm    Lab Review  Blood Type: O POS    Recent Results (from the past 24 hour(s))   GTT Fasting    Collection Time: 24  9:23 AM   Result Value Ref Range    Glucose Fasting 86 70 - 100 mg/dL   GTT 1 Hour    Collection Time: 24 10:24 AM   Result Value Ref Range    Glucose 1 Hour 86 (L) 100 - 180 mg/dL   GTT 2 Hour    Collection Time: 24 11:23 AM   Result Value Ref Range    Glucose 2 Hour 85 70 - 140 mg/dL   GTT 3 Hour    Collection Time: 24 12:36 PM   Result Value Ref Range    Glucose 3 Hour 82 70 - 115 mg/dL   Pathologist Interpretation    Collection Time: 24 12:36 PM   Result Value Ref Range    Pathology Review       No demonstrated impairment or other abnormality of glucose tolerance.    Len Kang M.D.          Assessment:       23 y.o.  at 24w2d  weeks gestation admitted for  twin gestation with PPROM of twin B, vaginal bleeding, IUFD of Twin A diagnosed at 16w, cHTN, elevated msAFP     Active Hospital Problems    Diagnosis  POA    Continuing pregnancy after intrauterine death of one fetus or more, second trimester, fetus 1 [O31.22X1]  Yes     premature rupture of membranes (PPROM) with unknown onset of labor- Twin B [O42.919]  Yes    4. Chronic hypertension affecting pregnancy [O10.919]  Yes      Resolved Hospital Problems   No resolved problems to display.        Plan:     1. Twins - previable PPROM Twin B  Patient's evaluation and exam is consistent with previable PPROM (rupture of membranes prior to 22 weeks 6 days gestation). Patient was counseled regarding the extremely poor prognosis of ruptured membranes at this early gestational age. We reviewed the maternal risks of previable premature rupture of membranes which include but are not limited to chorioamnionitis, endometritis, sepsis, potential need for hysterectomy and loss of fertility, VTE, and maternal death. We reviewed the fetal risks which include stillbirth, cord prolapse, infection, pulmonary hypoplasia, risk for musculoskeletal and facial anomalies due to severe oligohydramnios, and  death. We reviewed routine pulmonary maturation and the function of amniotic fluid in the normal growth of fetal lungs. We reviewed the unlikely chance of membranes resealing from spontaneous rupture. I counseled the patient regarding management options which prior to 22 weeks (ie gestational age < 21 weeks 6 days) include conservative management, induction of labor, or dilation and evacuation. At a gestational age of 22 weeks 0 days and greater, conservative management is recommended at [facility].  Known IUFD of Twin A - diagnosed at 16w (fetus was measuring 13w6d at that time)     Recommendations:  - s/p Latency antibiotics  - Monitor for s/s infection, abruption. Currently afebrile. No evidence  of leukocytosis- weekly CBC planned  - EFW q3 weeks (completed 24). No evidence of placenta previa on most recent ultrasound.  - Once weekly AFV/presentation, ().  - Prior to PPROM and this hospital admission, we had extensively discussed the high risk for fetal morbidity/mortality given Twin B's severe oligohydramnios since 16w and significantly elevated AFP. These risks are compounded by PPROM and we discussed this even further today utilizing translating services with her significant other present in the room. They were without questions and verbalized understanding.   - VTE prophylaxis (SCDs ordered) - compliance encouraged  - Considered fetal echo since we have been unable to confidently identify chorionicity of twin gestation as well as diagnosis of IUFD of Twin A, however, we believe this attempt would be futile as imaging has always been extremely limited due to chronic, severe oligohydramnios in addition to high risk of fetal/ morbidity/mortality. Recommend  evaluation if delivers at a viable gestation and resuscitative attempts are successful.   - - ANCS - and 12 hrs magnesium sulfate  - S/p NICU consult on      2. cHTN  - Continue home med of Nifedipine 30mg XL daily.  - Monitor Bps as ordered.  - If she has a mild headache, may administer Tylenol PRN.     3. BMI 32.59  -Complicates all aspects of care     4. Constipation  - Colace 200mg daily  - Miralax BID     5. Nausea  - Consistent with pregnancy type symptoms.   - ordered Vitamin B6  - Zofran PRN     6. Abnormal OGTT  - Failed 1 hr ogtt on  (early)  - Passed early 3hr OGTT  -3 HR OGTT - Passed.          Thank you for allowing us to participate in the care of your patient. If you have any questions/concerns, please do not hesitate to contact us.     Michelle Calvin MD  Maternal Fetal Medicine

## 2024-05-10 LAB
BASOPHILS # BLD AUTO: 0.05 X10(3)/MCL
BASOPHILS NFR BLD AUTO: 0.5 %
EOSINOPHIL # BLD AUTO: 0.18 X10(3)/MCL (ref 0–0.9)
EOSINOPHIL NFR BLD AUTO: 1.8 %
ERYTHROCYTE [DISTWIDTH] IN BLOOD BY AUTOMATED COUNT: 12.6 % (ref 11.5–17)
GROUP & RH: NORMAL
HCT VFR BLD AUTO: 30.7 % (ref 37–47)
HGB BLD-MCNC: 10.2 G/DL (ref 12–16)
IMM GRANULOCYTES # BLD AUTO: 0.04 X10(3)/MCL (ref 0–0.04)
IMM GRANULOCYTES NFR BLD AUTO: 0.4 %
INDIRECT COOMBS: NORMAL
LYMPHOCYTES # BLD AUTO: 2.41 X10(3)/MCL (ref 0.6–4.6)
LYMPHOCYTES NFR BLD AUTO: 24 %
MCH RBC QN AUTO: 29.7 PG (ref 27–31)
MCHC RBC AUTO-ENTMCNC: 33.2 G/DL (ref 33–36)
MCV RBC AUTO: 89.5 FL (ref 80–94)
MONOCYTES # BLD AUTO: 0.62 X10(3)/MCL (ref 0.1–1.3)
MONOCYTES NFR BLD AUTO: 6.2 %
NEUTROPHILS # BLD AUTO: 6.76 X10(3)/MCL (ref 2.1–9.2)
NEUTROPHILS NFR BLD AUTO: 67.1 %
NRBC BLD AUTO-RTO: 0 %
PLATELET # BLD AUTO: 278 X10(3)/MCL (ref 130–400)
PMV BLD AUTO: 8.4 FL (ref 7.4–10.4)
RBC # BLD AUTO: 3.43 X10(6)/MCL (ref 4.2–5.4)
SPECIMEN OUTDATE: NORMAL
WBC # SPEC AUTO: 10.06 X10(3)/MCL (ref 4.5–11.5)

## 2024-05-10 PROCEDURE — 25000003 PHARM REV CODE 250: Performed by: NURSE PRACTITIONER

## 2024-05-10 PROCEDURE — 11000001 HC ACUTE MED/SURG PRIVATE ROOM

## 2024-05-10 PROCEDURE — 99233 SBSQ HOSP IP/OBS HIGH 50: CPT | Mod: ,,, | Performed by: NURSE PRACTITIONER

## 2024-05-10 PROCEDURE — 25000003 PHARM REV CODE 250: Performed by: STUDENT IN AN ORGANIZED HEALTH CARE EDUCATION/TRAINING PROGRAM

## 2024-05-10 PROCEDURE — 86901 BLOOD TYPING SEROLOGIC RH(D): CPT

## 2024-05-10 PROCEDURE — 25000003 PHARM REV CODE 250

## 2024-05-10 PROCEDURE — A4216 STERILE WATER/SALINE, 10 ML: HCPCS | Performed by: NURSE PRACTITIONER

## 2024-05-10 PROCEDURE — 25000003 PHARM REV CODE 250: Performed by: OBSTETRICS & GYNECOLOGY

## 2024-05-10 PROCEDURE — 85025 COMPLETE CBC W/AUTO DIFF WBC: CPT

## 2024-05-10 RX ADMIN — PRENATAL VITAMINS-IRON FUMARATE 27 MG IRON-FOLIC ACID 0.8 MG TABLET 1 TABLET: at 08:05

## 2024-05-10 RX ADMIN — SODIUM CHLORIDE, PRESERVATIVE FREE 10 ML: 5 INJECTION INTRAVENOUS at 11:05

## 2024-05-10 RX ADMIN — PYRIDOXINE HCL TAB 50 MG 50 MG: 50 TAB at 08:05

## 2024-05-10 RX ADMIN — FERROUS SULFATE TAB 325 MG (65 MG ELEMENTAL FE) 1 EACH: 325 (65 FE) TAB at 08:05

## 2024-05-10 RX ADMIN — POLYETHYLENE GLYCOL 3350 17 G: 17 POWDER, FOR SOLUTION ORAL at 08:05

## 2024-05-10 RX ADMIN — NIFEDIPINE 30 MG: 30 TABLET, FILM COATED, EXTENDED RELEASE ORAL at 08:05

## 2024-05-10 RX ADMIN — DOCUSATE SODIUM 200 MG: 100 CAPSULE, LIQUID FILLED ORAL at 08:05

## 2024-05-10 RX ADMIN — SODIUM CHLORIDE, PRESERVATIVE FREE 10 ML: 5 INJECTION INTRAVENOUS at 10:05

## 2024-05-10 RX ADMIN — SODIUM CHLORIDE, PRESERVATIVE FREE 10 ML: 5 INJECTION INTRAVENOUS at 06:05

## 2024-05-10 RX ADMIN — ONDANSETRON 4 MG: 4 TABLET, ORALLY DISINTEGRATING ORAL at 08:05

## 2024-05-10 RX ADMIN — POLYETHYLENE GLYCOL 3350 17 G: 17 POWDER, FOR SOLUTION ORAL at 09:05

## 2024-05-10 RX ADMIN — SODIUM CHLORIDE, PRESERVATIVE FREE 10 ML: 5 INJECTION INTRAVENOUS at 05:05

## 2024-05-10 NOTE — PROGRESS NOTES
Progress Note  Maternal Fetal Medicine          Subjective:      IUP at 24w3d - (with known IUFD of Twin A since 16w EGA) who presented 24 for leaking/bleeding (oligohydramnios of twin B since early second trimester).     Doing well overnight. When she woke up this morning and ambulated to the bathroom, she noted a moderate amount of brown blood smeared on her peripad from overnight. Denies any more bleeding or leaking. Denies cramping/abd pain. Denies foul odor, malaise, chills. Afebrile. Positive fetal movement.     Nursing staff without concerns.     Objective:         Temp:  [97.7 °F (36.5 °C)-98.9 °F (37.2 °C)] 98.9 °F (37.2 °C)  Pulse:  [] 96  Resp:  [16-18] 16  SpO2:  [98 %] 98 %  BP: (111-131)/(63-77) 131/77    Gen: NAD, A&Ox3  Pulm: Unlabored breathing, LCTAB  Card: RRR  Abd: FHT present, soft, nondistended, nontender to palpation, gravid uterus palpable c/w gestational age  Extremities: Palpable peripheral pulses, no pedal edema, 2+ DTRs x 4    NST (last night; due this AM): 140 baseline, moderate BTBV, pos accelerations, neg decelerations    Being placed on EFM at time of rounds.   [place]: none  : Breech, FHTs+, Twin B EFW 479g, MVP 1.85cm, oligohydramnios.   24: vertex, posterior placenta, LOLY 1.44cm    Lab Review  Blood Type: O POS    No results found for this or any previous visit (from the past 24 hour(s)).        Assessment:       23 y.o.  at 24w3d weeks gestation admitted for  twin gestation with PPROM of twin B, vaginal bleeding, IUFD of Twin A diagnosed at 16w, cHTN, elevated msAFP     Active Hospital Problems    Diagnosis  POA    Continuing pregnancy after intrauterine death of one fetus or more, second trimester, fetus 1 [O31.22X1]  Yes     premature rupture of membranes (PPROM) with unknown onset of labor- Twin B [O42.919]  Yes    4. Chronic hypertension affecting pregnancy [O10.919]  Yes      Resolved Hospital Problems   No resolved problems to display.         Plan:     1. Twins - previable PPROM Twin B  Patient's evaluation and exam is consistent with previable PPROM (rupture of membranes prior to 22 weeks 6 days gestation). Patient was counseled regarding the extremely poor prognosis of ruptured membranes at this early gestational age. We reviewed the maternal risks of previable premature rupture of membranes which include but are not limited to chorioamnionitis, endometritis, sepsis, potential need for hysterectomy and loss of fertility, VTE, and maternal death. We reviewed the fetal risks which include stillbirth, cord prolapse, infection, pulmonary hypoplasia, risk for musculoskeletal and facial anomalies due to severe oligohydramnios, and  death. We reviewed routine pulmonary maturation and the function of amniotic fluid in the normal growth of fetal lungs. We reviewed the unlikely chance of membranes resealing from spontaneous rupture. I counseled the patient regarding management options which prior to 22 weeks (ie gestational age < 21 weeks 6 days) include conservative management, induction of labor, or dilation and evacuation. At a gestational age of 22 weeks 0 days and greater, conservative management is recommended at [facility].  Known IUFD of Twin A - diagnosed at 16w (fetus was measuring 13w6d at that time)     Recommendations:  - s/p Latency antibiotics  - Monitor for s/s infection, abruption. Currently afebrile. No evidence of leukocytosis- weekly CBC planned  - EFW q3 weeks (completed 24). No evidence of placenta previa on most recent ultrasound.  - Once weekly AFV/presentation, ().  - Prior to PPROM and this hospital admission, we had extensively discussed the high risk for fetal morbidity/mortality given Twin B's severe oligohydramnios since 16w and significantly elevated AFP. These risks are compounded by PPROM and we discussed this even further today utilizing translating services with her significant other present in the  room. They were without questions and verbalized understanding.   - VTE prophylaxis (SCDs ordered) - compliance encouraged  - Considered fetal echo since we have been unable to confidently identify chorionicity of twin gestation as well as diagnosis of IUFD of Twin A, however, we believe this attempt would be futile as imaging has always been extremely limited due to chronic, severe oligohydramnios in addition to high risk of fetal/ morbidity/mortality. Recommend  evaluation if delivers at a viable gestation and resuscitative attempts are successful.   - - ANCS - and 12 hrs magnesium sulfate  - S/p NICU consult on      2. cHTN  - Continue home med of Nifedipine 30mg XL daily.  - Monitor Bps as ordered.  - If she has a mild headache, may administer Tylenol PRN.     3. BMI 32.59  -Complicates all aspects of care     4. Constipation  - Colace 200mg daily  - Miralax BID     5. Nausea  - Consistent with pregnancy type symptoms.   - ordered Vitamin B6  - Zofran PRN     6. Abnormal OGTT  - Failed 1 hr ogtt on  (early)  - Passed early 3hr OGTT  -3 HR OGTT - Passed.          Thank you for allowing us to participate in the care of your patient. If you have any questions/concerns, please do not hesitate to contact us.     Randi Cooper MD  Maternal Fetal Medicine

## 2024-05-10 NOTE — PROGRESS NOTES
Antepartum Progress Note        Subjective:     Lily Mckeon is a 23 y.o.  @24w3d who is admitted for management of PPROM in the setting of twin gestation with fetal demise of Twin A.    Reports moderate brown blood covering about 1/3rd of tina pad when she woke up this morning. She denies any more bleeding, LOF abdominal pain/cramping overnight. Reports good  fetal movement.   Denies fever, chills, blurry vision, HA, dizziness, CP, SOB, N/V, or calf pain.    Objective:      Temp:  [97.7 °F (36.5 °C)-98.9 °F (37.2 °C)] 98.9 °F (37.2 °C)  Pulse:  [] 96  Resp:  [16-18] 16  SpO2:  [98 %] 98 %  BP: (111-131)/(63-77) 131/77  Body mass index is 32.7 kg/m².    General: no acute distress, appears comfortable  Resp: No increased WOB, breath sounds clear to auscultation bilaterally  CV: RRR  Abd: soft, nt, nd, gravid  MSK: R brachial PICC line   Ext: No erythema or calf tenderness      NST ( to this AM): 140 baseline, moderate BTBV, pos accelerations, neg decelerations    Being placed on EFM at time of rounds.   [place]: none     Growth imaging Dr. Calvin:  Twin B is breech.    bpm .   Posterior placenta    grams   MVP 1.85 cm      US: Breech, posterior, LOLY 1.5 cm.     US: Vertex, posterior, LOLY 1.44 cm.    Assessment/Plan     24 y/o  @ 24w3d admitted for management of PPROM of twin B, vaginal bleeding, IUFD of twin A diagnosed at 16 weeks, cHTN.      PPROM  - Positive ROM +  - S/p latency antibiotics:  S/p azithromycin 1 g on day 1 and day 5  S/p ampicillin IV 2 grams q 6 hours for 48 hours  S/p amoxicillin 500 mg TID  - GC/ CT : negative  - GBS: negative  - Wet prep: negative  - In house until delivery at 34 weeks  - S/p BMZ x2 doses and 12 hour course of magnesium sulfate.  - Fetal monitoring with NSTs 1h BID per MFM recs.  - Now s/p NICU consult on .  - Will continue to monitor vaginal bleeding. If patient complaints of abdominal pain, place on continuous  tocometer.  - 5/4: vaginal bleeding, speculum exam reassuring. MFM aware.  - 5/9: reports passage of small clot, no active bleeding thereafter. Will continue to monitor.      Chronic HTN  - continue daily Procardia 30 XL  - ASA 81 daily  - will continue to monitor blood pressures       Mono-Di twins with retained fetal demise of Twin A  - NSTs 1 hr BID  - PNV daily      Glucose testing   - 1 hr GTT O'Luu 190  - Early 3 hr glucose normal   - Repeat 3 hr OGTT done 5/8, passed      Anemia   - H/H 10.3/30.3 on 5/7.  - Continue PO iron.    Discussed with Donya Biswas NP. Will discuss with Dr. Brewster as well.      Mari Willoughby MD  [facility] Family Medicine Resident, [facility]

## 2024-05-11 PROBLEM — O45.90 PLACENTAL ABRUPTION AFFECTING DELIVERY: Status: ACTIVE | Noted: 2024-05-11

## 2024-05-11 PROBLEM — Z98.891 STATUS POST REPEAT LOW TRANSVERSE CESAREAN SECTION: Status: ACTIVE | Noted: 2024-05-11

## 2024-05-11 LAB
ALLENS TEST BLOOD GAS (OHS): ABNORMAL
BASE EXCESS BLD CALC-SCNC: 0.3 MMOL/L
BLOOD GAS SAMPLE TYPE (OHS): ABNORMAL
CA-I BLD-SCNC: 1.49 MMOL/L (ref 1.12–1.32)
CO2 BLDA-SCNC: 25.8 MMOL/L
DRAWN BY BLOOD GAS (OHS): ABNORMAL
HCO3 BLDA-SCNC: 24.6 MMOL/L
PCO2 BLDA: 38 MMHG
PH BLDA: 7.42 [PH]
PO2 BLDA: 55 MMHG
POTASSIUM BLOOD GAS (OHS): 4.4 MMOL/L
SAO2 % BLDA: 89 %
SODIUM BLOOD GAS (OHS): 134 MMOL/L

## 2024-05-11 PROCEDURE — 36004725 HC OB OR TIME LEV III - EA ADD 15 MIN: Performed by: OBSTETRICS & GYNECOLOGY

## 2024-05-11 PROCEDURE — 25000003 PHARM REV CODE 250

## 2024-05-11 PROCEDURE — 25000003 PHARM REV CODE 250: Performed by: STUDENT IN AN ORGANIZED HEALTH CARE EDUCATION/TRAINING PROGRAM

## 2024-05-11 PROCEDURE — 99900035 HC TECH TIME PER 15 MIN (STAT)

## 2024-05-11 PROCEDURE — 88305 TISSUE EXAM BY PATHOLOGIST: CPT | Performed by: OBSTETRICS & GYNECOLOGY

## 2024-05-11 PROCEDURE — 59514 CESAREAN DELIVERY ONLY: CPT | Mod: QX,CRNA,,

## 2024-05-11 PROCEDURE — 86923 COMPATIBILITY TEST ELECTRIC: CPT | Performed by: OBSTETRICS & GYNECOLOGY

## 2024-05-11 PROCEDURE — 71000033 HC RECOVERY, INTIAL HOUR: Performed by: OBSTETRICS & GYNECOLOGY

## 2024-05-11 PROCEDURE — 71000039 HC RECOVERY, EACH ADD'L HOUR: Performed by: OBSTETRICS & GYNECOLOGY

## 2024-05-11 PROCEDURE — 63600175 PHARM REV CODE 636 W HCPCS

## 2024-05-11 PROCEDURE — 37000008 HC ANESTHESIA 1ST 15 MINUTES: Performed by: OBSTETRICS & GYNECOLOGY

## 2024-05-11 PROCEDURE — 25000003 PHARM REV CODE 250: Performed by: OBSTETRICS & GYNECOLOGY

## 2024-05-11 PROCEDURE — 37000009 HC ANESTHESIA EA ADD 15 MINS: Performed by: OBSTETRICS & GYNECOLOGY

## 2024-05-11 PROCEDURE — 82803 BLOOD GASES ANY COMBINATION: CPT

## 2024-05-11 PROCEDURE — 25000003 PHARM REV CODE 250: Performed by: NURSE PRACTITIONER

## 2024-05-11 PROCEDURE — 59514 CESAREAN DELIVERY ONLY: CPT | Mod: QY,ANES,, | Performed by: ANESTHESIOLOGY

## 2024-05-11 PROCEDURE — A4216 STERILE WATER/SALINE, 10 ML: HCPCS | Performed by: NURSE PRACTITIONER

## 2024-05-11 PROCEDURE — 11000001 HC ACUTE MED/SURG PRIVATE ROOM

## 2024-05-11 PROCEDURE — 99465 NB RESUSCITATION: CPT

## 2024-05-11 PROCEDURE — 63600175 PHARM REV CODE 636 W HCPCS: Performed by: OBSTETRICS & GYNECOLOGY

## 2024-05-11 PROCEDURE — 36416 COLLJ CAPILLARY BLOOD SPEC: CPT

## 2024-05-11 PROCEDURE — 36004724 HC OB OR TIME LEV III - 1ST 15 MIN: Performed by: OBSTETRICS & GYNECOLOGY

## 2024-05-11 RX ORDER — OXYTOCIN/RINGER'S LACTATE 30/500 ML
334 PLASTIC BAG, INJECTION (ML) INTRAVENOUS ONCE AS NEEDED
Status: DISCONTINUED | OUTPATIENT
Start: 2024-05-11 | End: 2024-05-11 | Stop reason: SDUPTHER

## 2024-05-11 RX ORDER — SODIUM CITRATE AND CITRIC ACID MONOHYDRATE 334; 500 MG/5ML; MG/5ML
30 SOLUTION ORAL
Status: DISCONTINUED | OUTPATIENT
Start: 2024-05-11 | End: 2024-05-11

## 2024-05-11 RX ORDER — EPHEDRINE SULFATE 50 MG/ML
10 INJECTION, SOLUTION INTRAVENOUS
Status: CANCELLED | OUTPATIENT
Start: 2024-05-11 | End: 2024-05-11

## 2024-05-11 RX ORDER — MUPIROCIN 20 MG/G
OINTMENT TOPICAL 2 TIMES DAILY
Status: DISCONTINUED | OUTPATIENT
Start: 2024-05-11 | End: 2024-05-15 | Stop reason: HOSPADM

## 2024-05-11 RX ORDER — OXYTOCIN/RINGER'S LACTATE 30/500 ML
95 PLASTIC BAG, INJECTION (ML) INTRAVENOUS ONCE AS NEEDED
Status: DISCONTINUED | OUTPATIENT
Start: 2024-05-11 | End: 2024-05-12

## 2024-05-11 RX ORDER — OXYTOCIN/RINGER'S LACTATE 30/500 ML
95 PLASTIC BAG, INJECTION (ML) INTRAVENOUS ONCE
Status: DISCONTINUED | OUTPATIENT
Start: 2024-05-11 | End: 2024-05-12

## 2024-05-11 RX ORDER — SODIUM CITRATE AND CITRIC ACID MONOHYDRATE 334; 500 MG/5ML; MG/5ML
30 SOLUTION ORAL ONCE
Status: CANCELLED | OUTPATIENT
Start: 2024-05-11 | End: 2024-05-11

## 2024-05-11 RX ORDER — MISOPROSTOL 100 UG/1
800 TABLET ORAL ONCE AS NEEDED
Status: DISCONTINUED | OUTPATIENT
Start: 2024-05-11 | End: 2024-05-11 | Stop reason: SDUPTHER

## 2024-05-11 RX ORDER — ONDANSETRON HYDROCHLORIDE 2 MG/ML
INJECTION, SOLUTION INTRAVENOUS
Status: DISCONTINUED | OUTPATIENT
Start: 2024-05-11 | End: 2024-05-11

## 2024-05-11 RX ORDER — CALCIUM GLUCONATE 98 MG/ML
1 INJECTION, SOLUTION INTRAVENOUS
Status: DISCONTINUED | OUTPATIENT
Start: 2024-05-11 | End: 2024-05-11

## 2024-05-11 RX ORDER — MORPHINE SULFATE 1 MG/ML
INJECTION, SOLUTION EPIDURAL; INTRATHECAL; INTRAVENOUS
Status: DISCONTINUED | OUTPATIENT
Start: 2024-05-11 | End: 2024-05-11

## 2024-05-11 RX ORDER — ONDANSETRON 4 MG/1
8 TABLET, ORALLY DISINTEGRATING ORAL EVERY 8 HOURS PRN
Status: DISCONTINUED | OUTPATIENT
Start: 2024-05-11 | End: 2024-05-11 | Stop reason: SDUPTHER

## 2024-05-11 RX ORDER — METHYLERGONOVINE MALEATE 0.2 MG/ML
200 INJECTION INTRAVENOUS
Status: DISCONTINUED | OUTPATIENT
Start: 2024-05-11 | End: 2024-05-11

## 2024-05-11 RX ORDER — MUPIROCIN 20 MG/G
OINTMENT TOPICAL
Status: CANCELLED | OUTPATIENT
Start: 2024-05-11

## 2024-05-11 RX ORDER — SODIUM CHLORIDE 0.9 % (FLUSH) 0.9 %
10 SYRINGE (ML) INJECTION
Status: DISCONTINUED | OUTPATIENT
Start: 2024-05-11 | End: 2024-05-12

## 2024-05-11 RX ORDER — SIMETHICONE 80 MG
1 TABLET,CHEWABLE ORAL EVERY 6 HOURS PRN
Status: DISCONTINUED | OUTPATIENT
Start: 2024-05-11 | End: 2024-05-12

## 2024-05-11 RX ORDER — BISACODYL 10 MG/1
10 SUPPOSITORY RECTAL ONCE AS NEEDED
Status: DISCONTINUED | OUTPATIENT
Start: 2024-05-11 | End: 2024-05-12

## 2024-05-11 RX ORDER — FENTANYL/BUPIVACAINE/NS/PF 2-1250MCG
PLASTIC BAG, INJECTION (ML) INJECTION CONTINUOUS
Status: DISCONTINUED | OUTPATIENT
Start: 2024-05-11 | End: 2024-05-11

## 2024-05-11 RX ORDER — ACETAMINOPHEN 10 MG/ML
INJECTION, SOLUTION INTRAVENOUS
Status: DISCONTINUED | OUTPATIENT
Start: 2024-05-11 | End: 2024-05-11

## 2024-05-11 RX ORDER — HYDROCORTISONE 25 MG/G
CREAM TOPICAL 3 TIMES DAILY PRN
Status: DISCONTINUED | OUTPATIENT
Start: 2024-05-11 | End: 2024-05-15 | Stop reason: HOSPADM

## 2024-05-11 RX ORDER — OXYTOCIN/RINGER'S LACTATE 30/500 ML
334 PLASTIC BAG, INJECTION (ML) INTRAVENOUS ONCE AS NEEDED
Status: DISCONTINUED | OUTPATIENT
Start: 2024-05-11 | End: 2024-05-12

## 2024-05-11 RX ORDER — FENTANYL CITRATE 50 UG/ML
INJECTION, SOLUTION INTRAMUSCULAR; INTRAVENOUS
Status: DISCONTINUED | OUTPATIENT
Start: 2024-05-11 | End: 2024-05-11

## 2024-05-11 RX ORDER — EPHEDRINE SULFATE 50 MG/ML
INJECTION, SOLUTION INTRAVENOUS
Status: DISCONTINUED | OUTPATIENT
Start: 2024-05-11 | End: 2024-05-11

## 2024-05-11 RX ORDER — MISOPROSTOL 100 UG/1
800 TABLET ORAL ONCE AS NEEDED
Status: DISCONTINUED | OUTPATIENT
Start: 2024-05-11 | End: 2024-05-12

## 2024-05-11 RX ORDER — OXYTOCIN 10 [USP'U]/ML
INJECTION, SOLUTION INTRAMUSCULAR; INTRAVENOUS
Status: DISCONTINUED | OUTPATIENT
Start: 2024-05-11 | End: 2024-05-11

## 2024-05-11 RX ORDER — CEFAZOLIN SODIUM 1 G/3ML
INJECTION, POWDER, FOR SOLUTION INTRAMUSCULAR; INTRAVENOUS
Status: DISCONTINUED | OUTPATIENT
Start: 2024-05-11 | End: 2024-05-11

## 2024-05-11 RX ORDER — ACETAMINOPHEN 325 MG/1
975 TABLET ORAL EVERY 8 HOURS
Status: DISCONTINUED | OUTPATIENT
Start: 2024-05-11 | End: 2024-05-13

## 2024-05-11 RX ORDER — CARBOPROST TROMETHAMINE 250 UG/ML
250 INJECTION, SOLUTION INTRAMUSCULAR
Status: DISCONTINUED | OUTPATIENT
Start: 2024-05-11 | End: 2024-05-11 | Stop reason: SDUPTHER

## 2024-05-11 RX ORDER — ADHESIVE BANDAGE
30 BANDAGE TOPICAL 2 TIMES DAILY PRN
Status: DISCONTINUED | OUTPATIENT
Start: 2024-05-12 | End: 2024-05-12

## 2024-05-11 RX ORDER — MISOPROSTOL 100 UG/1
800 TABLET ORAL ONCE AS NEEDED
Status: DISCONTINUED | OUTPATIENT
Start: 2024-05-11 | End: 2024-05-15 | Stop reason: HOSPADM

## 2024-05-11 RX ORDER — SUCCINYLCHOLINE CHLORIDE 20 MG/ML
INJECTION INTRAMUSCULAR; INTRAVENOUS
Status: DISCONTINUED | OUTPATIENT
Start: 2024-05-11 | End: 2024-05-11

## 2024-05-11 RX ORDER — OXYTOCIN/RINGER'S LACTATE 30/500 ML
PLASTIC BAG, INJECTION (ML) INTRAVENOUS
Status: DISCONTINUED | OUTPATIENT
Start: 2024-05-11 | End: 2024-05-11

## 2024-05-11 RX ORDER — CARBOPROST TROMETHAMINE 250 UG/ML
250 INJECTION, SOLUTION INTRAMUSCULAR
Status: DISCONTINUED | OUTPATIENT
Start: 2024-05-11 | End: 2024-05-11

## 2024-05-11 RX ORDER — OXYTOCIN/RINGER'S LACTATE 30/500 ML
95 PLASTIC BAG, INJECTION (ML) INTRAVENOUS ONCE AS NEEDED
Status: DISCONTINUED | OUTPATIENT
Start: 2024-05-11 | End: 2024-05-11 | Stop reason: SDUPTHER

## 2024-05-11 RX ORDER — OXYTOCIN 10 [USP'U]/ML
10 INJECTION, SOLUTION INTRAMUSCULAR; INTRAVENOUS ONCE AS NEEDED
Status: DISCONTINUED | OUTPATIENT
Start: 2024-05-11 | End: 2024-05-11 | Stop reason: SDUPTHER

## 2024-05-11 RX ORDER — AMOXICILLIN 250 MG
1 CAPSULE ORAL NIGHTLY PRN
Status: DISCONTINUED | OUTPATIENT
Start: 2024-05-11 | End: 2024-05-15 | Stop reason: HOSPADM

## 2024-05-11 RX ORDER — CEFAZOLIN SODIUM 2 G/50ML
2 SOLUTION INTRAVENOUS ONCE AS NEEDED
Status: DISCONTINUED | OUTPATIENT
Start: 2024-05-11 | End: 2024-05-11

## 2024-05-11 RX ORDER — METHYLERGONOVINE MALEATE 0.2 MG/ML
200 INJECTION INTRAVENOUS ONCE AS NEEDED
Status: DISCONTINUED | OUTPATIENT
Start: 2024-05-11 | End: 2024-05-11 | Stop reason: SDUPTHER

## 2024-05-11 RX ORDER — MAGNESIUM SULFATE HEPTAHYDRATE 40 MG/ML
6 INJECTION, SOLUTION INTRAVENOUS ONCE
Status: COMPLETED | OUTPATIENT
Start: 2024-05-11 | End: 2024-05-11

## 2024-05-11 RX ORDER — CARBOPROST TROMETHAMINE 250 UG/ML
250 INJECTION, SOLUTION INTRAMUSCULAR
Status: DISCONTINUED | OUTPATIENT
Start: 2024-05-11 | End: 2024-05-15 | Stop reason: HOSPADM

## 2024-05-11 RX ORDER — OXYCODONE HYDROCHLORIDE 5 MG/1
5 TABLET ORAL EVERY 4 HOURS PRN
Status: DISPENSED | OUTPATIENT
Start: 2024-05-11 | End: 2024-05-12

## 2024-05-11 RX ORDER — OXYTOCIN/RINGER'S LACTATE 30/500 ML
95 PLASTIC BAG, INJECTION (ML) INTRAVENOUS ONCE
Status: DISCONTINUED | OUTPATIENT
Start: 2024-05-11 | End: 2024-05-11

## 2024-05-11 RX ORDER — SODIUM CHLORIDE 0.9 % (FLUSH) 0.9 %
10 SYRINGE (ML) INJECTION
Status: DISCONTINUED | OUTPATIENT
Start: 2024-05-11 | End: 2024-05-15 | Stop reason: HOSPADM

## 2024-05-11 RX ORDER — OXYTOCIN 10 [USP'U]/ML
10 INJECTION, SOLUTION INTRAMUSCULAR; INTRAVENOUS ONCE AS NEEDED
Status: DISCONTINUED | OUTPATIENT
Start: 2024-05-11 | End: 2024-05-12

## 2024-05-11 RX ORDER — METHYLERGONOVINE MALEATE 0.2 MG/ML
200 INJECTION INTRAVENOUS ONCE AS NEEDED
Status: DISCONTINUED | OUTPATIENT
Start: 2024-05-11 | End: 2024-05-12

## 2024-05-11 RX ORDER — SODIUM CHLORIDE, SODIUM LACTATE, POTASSIUM CHLORIDE, CALCIUM CHLORIDE 600; 310; 30; 20 MG/100ML; MG/100ML; MG/100ML; MG/100ML
INJECTION, SOLUTION INTRAVENOUS CONTINUOUS
Status: DISCONTINUED | OUTPATIENT
Start: 2024-05-11 | End: 2024-05-11

## 2024-05-11 RX ORDER — SODIUM CHLORIDE, SODIUM LACTATE, POTASSIUM CHLORIDE, CALCIUM CHLORIDE 600; 310; 30; 20 MG/100ML; MG/100ML; MG/100ML; MG/100ML
INJECTION, SOLUTION INTRAVENOUS CONTINUOUS PRN
Status: DISCONTINUED | OUTPATIENT
Start: 2024-05-11 | End: 2024-05-11

## 2024-05-11 RX ORDER — OXYTOCIN/RINGER'S LACTATE 30/500 ML
334 PLASTIC BAG, INJECTION (ML) INTRAVENOUS ONCE
Status: DISCONTINUED | OUTPATIENT
Start: 2024-05-11 | End: 2024-05-11

## 2024-05-11 RX ORDER — FAMOTIDINE 10 MG/ML
20 INJECTION INTRAVENOUS
Status: DISCONTINUED | OUTPATIENT
Start: 2024-05-11 | End: 2024-05-11

## 2024-05-11 RX ORDER — DIPHENOXYLATE HYDROCHLORIDE AND ATROPINE SULFATE 2.5; .025 MG/1; MG/1
2 TABLET ORAL EVERY 6 HOURS PRN
Status: DISCONTINUED | OUTPATIENT
Start: 2024-05-11 | End: 2024-05-11 | Stop reason: SDUPTHER

## 2024-05-11 RX ORDER — MISOPROSTOL 100 UG/1
800 TABLET ORAL
Status: DISCONTINUED | OUTPATIENT
Start: 2024-05-11 | End: 2024-05-11

## 2024-05-11 RX ORDER — PRENATAL WITH FERROUS FUM AND FOLIC ACID 3080; 920; 120; 400; 22; 1.84; 3; 20; 10; 1; 12; 200; 27; 25; 2 [IU]/1; [IU]/1; MG/1; [IU]/1; MG/1; MG/1; MG/1; MG/1; MG/1; MG/1; UG/1; MG/1; MG/1; MG/1; MG/1
1 TABLET ORAL DAILY
Status: DISCONTINUED | OUTPATIENT
Start: 2024-05-12 | End: 2024-05-12

## 2024-05-11 RX ORDER — DEXAMETHASONE SODIUM PHOSPHATE 4 MG/ML
INJECTION, SOLUTION INTRA-ARTICULAR; INTRALESIONAL; INTRAMUSCULAR; INTRAVENOUS; SOFT TISSUE
Status: DISCONTINUED | OUTPATIENT
Start: 2024-05-11 | End: 2024-05-11

## 2024-05-11 RX ORDER — SODIUM CHLORIDE, SODIUM LACTATE, POTASSIUM CHLORIDE, CALCIUM CHLORIDE 600; 310; 30; 20 MG/100ML; MG/100ML; MG/100ML; MG/100ML
1000 INJECTION, SOLUTION INTRAVENOUS CONTINUOUS
Status: DISCONTINUED | OUTPATIENT
Start: 2024-05-11 | End: 2024-05-11

## 2024-05-11 RX ORDER — OXYCODONE HYDROCHLORIDE 5 MG/1
10 TABLET ORAL EVERY 4 HOURS PRN
Status: DISPENSED | OUTPATIENT
Start: 2024-05-11 | End: 2024-05-12

## 2024-05-11 RX ORDER — ONDANSETRON 4 MG/1
8 TABLET, ORALLY DISINTEGRATING ORAL EVERY 8 HOURS PRN
Status: DISCONTINUED | OUTPATIENT
Start: 2024-05-11 | End: 2024-05-15 | Stop reason: HOSPADM

## 2024-05-11 RX ORDER — MAGNESIUM SULFATE HEPTAHYDRATE 40 MG/ML
INJECTION, SOLUTION INTRAVENOUS
Status: DISPENSED
Start: 2024-05-11 | End: 2024-05-12

## 2024-05-11 RX ORDER — DOCUSATE SODIUM 100 MG/1
200 CAPSULE, LIQUID FILLED ORAL 2 TIMES DAILY
Status: DISCONTINUED | OUTPATIENT
Start: 2024-05-11 | End: 2024-05-15 | Stop reason: HOSPADM

## 2024-05-11 RX ORDER — PHENYLEPHRINE HCL IN 0.9% NACL 1 MG/10 ML
SYRINGE (ML) INTRAVENOUS
Status: DISCONTINUED | OUTPATIENT
Start: 2024-05-11 | End: 2024-05-11

## 2024-05-11 RX ORDER — DIPHENHYDRAMINE HCL 25 MG
25 CAPSULE ORAL EVERY 4 HOURS PRN
Status: DISCONTINUED | OUTPATIENT
Start: 2024-05-11 | End: 2024-05-12

## 2024-05-11 RX ORDER — NIFEDIPINE 30 MG/1
30 TABLET, EXTENDED RELEASE ORAL DAILY
Status: DISCONTINUED | OUTPATIENT
Start: 2024-05-12 | End: 2024-05-15 | Stop reason: HOSPADM

## 2024-05-11 RX ORDER — PROPOFOL 10 MG/ML
VIAL (ML) INTRAVENOUS
Status: DISCONTINUED | OUTPATIENT
Start: 2024-05-11 | End: 2024-05-11

## 2024-05-11 RX ORDER — DIPHENOXYLATE HYDROCHLORIDE AND ATROPINE SULFATE 2.5; .025 MG/1; MG/1
2 TABLET ORAL EVERY 6 HOURS PRN
Status: DISCONTINUED | OUTPATIENT
Start: 2024-05-11 | End: 2024-05-15 | Stop reason: HOSPADM

## 2024-05-11 RX ORDER — HYDROCORTISONE ACETATE PRAMOXINE HCL 1; 1 G/100G; G/100G
CREAM TOPICAL 4 TIMES DAILY PRN
Status: DISCONTINUED | OUTPATIENT
Start: 2024-05-11 | End: 2024-05-15 | Stop reason: HOSPADM

## 2024-05-11 RX ADMIN — SODIUM CHLORIDE, POTASSIUM CHLORIDE, SODIUM LACTATE AND CALCIUM CHLORIDE: 600; 310; 30; 20 INJECTION, SOLUTION INTRAVENOUS at 06:05

## 2024-05-11 RX ADMIN — ACETAMINOPHEN 975 MG: 325 TABLET, FILM COATED ORAL at 10:05

## 2024-05-11 RX ADMIN — SODIUM CHLORIDE, PRESERVATIVE FREE 10 ML: 5 INJECTION INTRAVENOUS at 06:05

## 2024-05-11 RX ADMIN — SODIUM CHLORIDE, PRESERVATIVE FREE 10 ML: 5 INJECTION INTRAVENOUS at 11:05

## 2024-05-11 RX ADMIN — EPHEDRINE SULFATE 25 MG: 50 INJECTION INTRAVENOUS at 07:05

## 2024-05-11 RX ADMIN — MAGNESIUM SULFATE HEPTAHYDRATE 6 G: 40 INJECTION, SOLUTION INTRAVENOUS at 06:05

## 2024-05-11 RX ADMIN — PROPOFOL 50 MG: 10 INJECTION, EMULSION INTRAVENOUS at 07:05

## 2024-05-11 RX ADMIN — MORPHINE SULFATE 5 MG: 1 INJECTION, SOLUTION EPIDURAL; INTRATHECAL; INTRAVENOUS at 07:05

## 2024-05-11 RX ADMIN — OXYCODONE HYDROCHLORIDE 10 MG: 5 TABLET ORAL at 10:05

## 2024-05-11 RX ADMIN — Medication 500 ML: at 07:05

## 2024-05-11 RX ADMIN — PYRIDOXINE HCL TAB 50 MG 50 MG: 50 TAB at 08:05

## 2024-05-11 RX ADMIN — Medication 100 MCG: at 07:05

## 2024-05-11 RX ADMIN — PRENATAL VITAMINS-IRON FUMARATE 27 MG IRON-FOLIC ACID 0.8 MG TABLET 1 TABLET: at 08:05

## 2024-05-11 RX ADMIN — FERROUS SULFATE TAB 325 MG (65 MG ELEMENTAL FE) 1 EACH: 325 (65 FE) TAB at 08:05

## 2024-05-11 RX ADMIN — PROPOFOL 150 MG: 10 INJECTION, EMULSION INTRAVENOUS at 06:05

## 2024-05-11 RX ADMIN — SODIUM CHLORIDE, POTASSIUM CHLORIDE, SODIUM LACTATE AND CALCIUM CHLORIDE 1000 ML: 600; 310; 30; 20 INJECTION, SOLUTION INTRAVENOUS at 06:05

## 2024-05-11 RX ADMIN — POLYETHYLENE GLYCOL 3350 17 G: 17 POWDER, FOR SOLUTION ORAL at 08:05

## 2024-05-11 RX ADMIN — ONDANSETRON 4 MG: 4 TABLET, ORALLY DISINTEGRATING ORAL at 06:05

## 2024-05-11 RX ADMIN — ONDANSETRON 8 MG: 2 INJECTION INTRAMUSCULAR; INTRAVENOUS at 06:05

## 2024-05-11 RX ADMIN — MORPHINE SULFATE 5 MG: 1 INJECTION, SOLUTION EPIDURAL; INTRATHECAL; INTRAVENOUS at 06:05

## 2024-05-11 RX ADMIN — DOCUSATE SODIUM 200 MG: 100 CAPSULE, LIQUID FILLED ORAL at 08:05

## 2024-05-11 RX ADMIN — DEXAMETHASONE SODIUM PHOSPHATE 8 MG: 4 INJECTION, SOLUTION INTRA-ARTICULAR; INTRALESIONAL; INTRAMUSCULAR; INTRAVENOUS; SOFT TISSUE at 06:05

## 2024-05-11 RX ADMIN — Medication 500 ML: at 06:05

## 2024-05-11 RX ADMIN — ACETAMINOPHEN 1000 MG: 10 INJECTION, SOLUTION INTRAVENOUS at 06:05

## 2024-05-11 RX ADMIN — OXYTOCIN 10 UNITS: 10 INJECTION, SOLUTION INTRAMUSCULAR; INTRAVENOUS at 06:05

## 2024-05-11 RX ADMIN — NIFEDIPINE 30 MG: 30 TABLET, FILM COATED, EXTENDED RELEASE ORAL at 08:05

## 2024-05-11 RX ADMIN — CEFAZOLIN 2 G: 330 INJECTION, POWDER, FOR SOLUTION INTRAMUSCULAR; INTRAVENOUS at 06:05

## 2024-05-11 RX ADMIN — FENTANYL CITRATE 100 MCG: 50 INJECTION, SOLUTION INTRAMUSCULAR; INTRAVENOUS at 06:05

## 2024-05-11 RX ADMIN — SUCCINYLCHOLINE CHLORIDE 140 MG: 20 INJECTION, SOLUTION INTRAMUSCULAR; INTRAVENOUS at 06:05

## 2024-05-11 NOTE — PROGRESS NOTES
Antepartum Progress Note        Subjective:     Lily Mckeon is a 23 y.o.  @24w4d who is admitted for management of PPROM in the setting of twin gestation with fetal demise of Twin A.    Patient reports doing well this AM. She denies bleeding during the day yesterday. She states that when she checked tina pad this AM, there was brown smear on it, but no red blood. Patient denies LOF, abdominal pain/cramping. She reports that she had good fetal movement yesterday, but has not felt baby move since midnight as at 730am this morning.   Patient is yet to be put on NST this morning, but per nursing staff, NSTs last night were good. Baseline FHR 150s, mod variability, + acels.     Patient denies fever, chills, blurry vision, HA, dizziness, CP, SOB, N/V, or calf pain.    Objective:      Temp:  [98 °F (36.7 °C)-99 °F (37.2 °C)] 98.4 °F (36.9 °C)  Pulse:  [] 91  Resp:  [14-16] 16  SpO2:  [99 %] 99 %  BP: (111-131)/(55-77) 111/55  Body mass index is 32.7 kg/m².    General: no acute distress, appears comfortable  Resp: No increased WOB, breath sounds clear to auscultation bilaterally  CV: RRR  Abd: soft, nt, nd, gravid  MSK: R brachial PICC line   Ext: No erythema or calf tenderness      NST (5/10): 150 baseline, moderate BTBV, pos accelerations, neg decelerations    Will be placed on NST after breakfast this AM  Lev[place]: none     Growth imaging Dr. Calvin:  Twin B is breech.    bpm .   Posterior placenta    grams   MVP 1.85 cm      US: Breech, posterior, LOLY 1.5 cm.     US: Vertex, posterior, LOLY 1.44 cm.    Assessment/Plan     24 y/o  @ 24w4d admitted for management of PPROM of twin B, vaginal bleeding, IUFD of twin A diagnosed at 16 weeks, cHTN.      PPROM  - Positive ROM +  - S/p latency antibiotics:  S/p azithromycin 1 g on day 1 and day 5  S/p ampicillin IV 2 grams q 6 hours for 48 hours  S/p amoxicillin 500 mg TID  - GC/ CT : negative  - GBS: negative  - Wet prep:  negative  - In house until delivery at 34 weeks  - S/p BMZ x2 doses and 12 hour course of magnesium sulfate.  - Fetal monitoring with NSTs 1h BID per [facility] recs.  - Now s/p NICU consult on 4/30.  - Will continue to monitor vaginal bleeding. If patient complaints of abdominal pain, place on continuous tocometer.      Chronic HTN  - continue daily Procardia 30 XL  - ASA 81 daily  - will continue to monitor blood pressures       Mono-Di twins with retained fetal demise of Twin A  - NSTs 1 hr BID  - PNV daily      Glucose testing   - 1 hr GTT O'Luu 190  - Early 3 hr glucose normal   - Repeat 3 hr OGTT done 5/8, passed      Anemia   - H/H 10.3/30.3 on 5/7.  - Continue PO iron.      Discussed with staff.      Mari Willoughby MD  [facility] Family Medicine Resident, -[place]

## 2024-05-11 NOTE — ANESTHESIA PREPROCEDURE EVALUATION
2024  Lily Mckeon is a 23 y.o., female, stat csection, bleeding, per ob no time for regional       Pre-op Assessment    I have reviewed the Patient Summary Reports.     I have reviewed the Nursing Notes. I have reviewed the NPO Status.   I have reviewed the Medications.     Review of Systems  Cardiovascular:  Exercise tolerance: good   Hypertension                                            Physical Exam  General: Well nourished and Cooperative    Airway:  Mallampati: II   Mouth Opening: Normal  TM Distance: Normal  Tongue: Normal  Neck ROM: Normal ROM    Dental:  Intact    Chest/Lungs:  Clear to auscultation    Heart:  Rhythm: Regular Rhythm        Anesthesia Plan  Type of Anesthesia, risks & benefits discussed:    Anesthesia Type: Gen ETT  Intra-op Monitoring Plan: Standard ASA Monitors  Post Op Pain Control Plan: multimodal analgesia  Induction:  rapid sequence and IV  Informed Consent: Informed consent signed with the Patient and all parties understand the risks and agree with anesthesia plan.  All questions answered.   ASA Score: 2 Emergent  Day of Surgery Review of History & Physical: H&P Update referred to the surgeon/provider.    Ready For Surgery From Anesthesia Perspective.     .  I explained anesthesia plan to patient/responsbile party if available.  Anesthesia consent done going over the material facts, risks, complications & alternatives, obtained which includes the possibility of altering the anesthesia plan.  I reviewed problem list, prior to admission medication list, appropriate labs, any workup, Xray, EKG etc noted below.  Patients condition is satisfactory to proceed with anesthesia plan unless otherwise noted (see anesthesia chart for details of the anesthesia plan carried out).      Pre-operative evaluation for Procedure(s) (LRB):   SECTION (N/A)    /71    "Pulse 96   Temp 37.2 °C (98.9 °F) (Oral)   Resp 19   Ht 5' 3" (1.6 m)   Wt 83.7 kg (184 lb 9.6 oz)   LMP  (LMP Unknown)   SpO2 99%   Breastfeeding No   BMI 32.70 kg/m²     Patient Active Problem List   Diagnosis    1. Fetal demise (twin A) before 22 weeks with retention of dead fetus    2. Oligohydramnios antepartum, second trimester, fetus 2    3. Twin gestation with unknown number of placentas and amniotic sacs in second trimester    4. Chronic hypertension affecting pregnancy    5. BMI affecting pregnancy in second trimester    Antepartum hemorrhage, second trimester    Pregnancy with 20 completed weeks gestation     premature rupture of membranes (PPROM) with unknown onset of labor- Twin B    Previous  delivery affecting pregnancy    Continuing pregnancy after intrauterine death of one fetus or more, second trimester, fetus 1       Review of patient's allergies indicates:   Allergen Reactions    Pineapple Hives and Shortness Of Breath       Current Outpatient Medications   Medication Instructions    aspirin (BUFFERIN) 325 MG Tab 81 mg, Oral, Daily    NIFEdipine (PROCARDIA-XL) 30 mg, Oral, Daily    prenatal vit no.124/iron/folic (PRENATAL VITAMIN ORAL) Oral    pyridoxine (vitamin B6) (B-6) 25 mg, Oral, Every 8 hours PRN       Past Surgical History:   Procedure Laterality Date    APPENDECTOMY       SECTION  2018       Social History     Socioeconomic History    Marital status: Single   Tobacco Use    Smoking status: Never     Passive exposure: Current    Smokeless tobacco: Never   Substance and Sexual Activity    Alcohol use: Not Currently    Drug use: Never    Sexual activity: Yes     Partners: Male     Social Determinants of Health     Financial Resource Strain: Low Risk  (2024)    Overall Financial Resource Strain (CARDIA)     Difficulty of Paying Living Expenses: Not very hard   Recent Concern: Financial Resource Strain - High Risk (3/11/2024)    Overall Financial " Resource Strain (CARDIA)     Difficulty of Paying Living Expenses: Hard   Food Insecurity: No Food Insecurity (4/16/2024)    Hunger Vital Sign     Worried About Running Out of Food in the Last Year: Never true     Ran Out of Food in the Last Year: Never true   Transportation Needs: Unmet Transportation Needs (4/16/2024)    PRAPARE - Transportation     Lack of Transportation (Medical): Yes     Lack of Transportation (Non-Medical): No   Physical Activity: Insufficiently Active (3/11/2024)    Exercise Vital Sign     Days of Exercise per Week: 2 days     Minutes of Exercise per Session: 10 min   Stress: No Stress Concern Present (4/16/2024)    [employer] - Occupational Stress Questionnaire     Feeling of Stress : Not at all   Housing Stability: Low Risk  (4/16/2024)    Housing Stability Vital Sign     Unable to Pay for Housing in the Last Year: No     Homeless in the Last Year: No   Recent Concern: Housing Stability - High Risk (3/11/2024)    Housing Stability Vital Sign     Number of Places Lived in the Last Year: 3     Unstable Housing in the Last Year: No       Lab Results   Component Value Date    WBC 10.06 05/10/2024    HGB 10.2 (L) 05/10/2024    HCT 30.7 (L) 05/10/2024    MCV 89.5 05/10/2024     05/10/2024          BMP  Lab Results   Component Value Date    HCT 30.7 (L) 05/10/2024     03/15/2024    K 3.9 03/15/2024    BUN 5.5 (L) 03/15/2024    CREATININE 0.56 03/15/2024    CALCIUM 9.0 03/15/2024            Mo Lynn MD

## 2024-05-11 NOTE — SIGNIFICANT EVENT
Patient with known mono-di twin gestation with demise of Twin A. She has been PPROM since admission on 04/16/2024. Patient also with vaginal bleeding since that time. Bleeding has been stable since admission.     At approximately 1800, MD was called to bedside after patient passed an egg sized blood clot while voiding. She declines pain or active bleeding at that time. SSE was done by myself with thick, brown blood in vault and cervix visually closed. Fetal monitoring with no contractions and reassuring fetal status.    MD called back to bedside a 1820 to assess bleeding. Patient called out after saturating a pad with bright red blood. Patient now with abdominal pain in the right, periumbilical region. RN given orders to start MgSO4 for neuroprotection. Last BMZ series was 04/30-05/01.    M, Dr. Kim, called and agrees with plan to delivery due to heavy vaginal bleeding (abrupt change from baseline), pain with concerns for abruption.      Harris Alexander MD

## 2024-05-11 NOTE — NURSING
Dr. Ortiz at bedside to perform speculum exam, due to patient passing an egg sized clot. MD stated that patient's cervix is closed, brown discharge noted.

## 2024-05-12 LAB
BASOPHILS # BLD AUTO: 0.02 X10(3)/MCL
BASOPHILS NFR BLD AUTO: 0.1 %
EOSINOPHIL # BLD AUTO: 0.01 X10(3)/MCL (ref 0–0.9)
EOSINOPHIL NFR BLD AUTO: 0.1 %
ERYTHROCYTE [DISTWIDTH] IN BLOOD BY AUTOMATED COUNT: 12.4 % (ref 11.5–17)
HCT VFR BLD AUTO: 30.7 % (ref 37–47)
HGB BLD-MCNC: 10 G/DL (ref 12–16)
IMM GRANULOCYTES # BLD AUTO: 0.06 X10(3)/MCL (ref 0–0.04)
IMM GRANULOCYTES NFR BLD AUTO: 0.4 %
LYMPHOCYTES # BLD AUTO: 1.73 X10(3)/MCL (ref 0.6–4.6)
LYMPHOCYTES NFR BLD AUTO: 12.3 %
MCH RBC QN AUTO: 30.2 PG (ref 27–31)
MCHC RBC AUTO-ENTMCNC: 32.6 G/DL (ref 33–36)
MCV RBC AUTO: 92.7 FL (ref 80–94)
MONOCYTES # BLD AUTO: 0.59 X10(3)/MCL (ref 0.1–1.3)
MONOCYTES NFR BLD AUTO: 4.2 %
NEUTROPHILS # BLD AUTO: 11.62 X10(3)/MCL (ref 2.1–9.2)
NEUTROPHILS NFR BLD AUTO: 82.9 %
NRBC BLD AUTO-RTO: 0 %
PLATELET # BLD AUTO: 290 X10(3)/MCL (ref 130–400)
PMV BLD AUTO: 8.8 FL (ref 7.4–10.4)
RBC # BLD AUTO: 3.31 X10(6)/MCL (ref 4.2–5.4)
WBC # SPEC AUTO: 14.03 X10(3)/MCL (ref 4.5–11.5)

## 2024-05-12 PROCEDURE — 36415 COLL VENOUS BLD VENIPUNCTURE: CPT | Performed by: OBSTETRICS & GYNECOLOGY

## 2024-05-12 PROCEDURE — 25000003 PHARM REV CODE 250: Performed by: OBSTETRICS & GYNECOLOGY

## 2024-05-12 PROCEDURE — 11000001 HC ACUTE MED/SURG PRIVATE ROOM

## 2024-05-12 PROCEDURE — 85025 COMPLETE CBC W/AUTO DIFF WBC: CPT | Performed by: OBSTETRICS & GYNECOLOGY

## 2024-05-12 RX ORDER — SIMETHICONE 80 MG
1 TABLET,CHEWABLE ORAL EVERY 6 HOURS PRN
Status: DISCONTINUED | OUTPATIENT
Start: 2024-05-12 | End: 2024-05-15 | Stop reason: HOSPADM

## 2024-05-12 RX ORDER — BISACODYL 10 MG/1
10 SUPPOSITORY RECTAL ONCE AS NEEDED
Status: DISCONTINUED | OUTPATIENT
Start: 2024-05-12 | End: 2024-05-15 | Stop reason: HOSPADM

## 2024-05-12 RX ORDER — IBUPROFEN 600 MG/1
600 TABLET ORAL
Status: DISCONTINUED | OUTPATIENT
Start: 2024-05-12 | End: 2024-05-15 | Stop reason: HOSPADM

## 2024-05-12 RX ORDER — PRENATAL WITH FERROUS FUM AND FOLIC ACID 3080; 920; 120; 400; 22; 1.84; 3; 20; 10; 1; 12; 200; 27; 25; 2 [IU]/1; [IU]/1; MG/1; [IU]/1; MG/1; MG/1; MG/1; MG/1; MG/1; MG/1; UG/1; MG/1; MG/1; MG/1; MG/1
1 TABLET ORAL DAILY
Status: DISCONTINUED | OUTPATIENT
Start: 2024-05-12 | End: 2024-05-15 | Stop reason: HOSPADM

## 2024-05-12 RX ORDER — OXYTOCIN/RINGER'S LACTATE 30/500 ML
95 PLASTIC BAG, INJECTION (ML) INTRAVENOUS ONCE
Status: DISCONTINUED | OUTPATIENT
Start: 2024-05-12 | End: 2024-05-12

## 2024-05-12 RX ORDER — OXYCODONE AND ACETAMINOPHEN 5; 325 MG/1; MG/1
1 TABLET ORAL EVERY 4 HOURS PRN
Status: DISCONTINUED | OUTPATIENT
Start: 2024-05-12 | End: 2024-05-12

## 2024-05-12 RX ORDER — ADHESIVE BANDAGE
30 BANDAGE TOPICAL 2 TIMES DAILY PRN
Status: DISCONTINUED | OUTPATIENT
Start: 2024-05-13 | End: 2024-05-15 | Stop reason: HOSPADM

## 2024-05-12 RX ORDER — OXYCODONE AND ACETAMINOPHEN 10; 325 MG/1; MG/1
1 TABLET ORAL EVERY 4 HOURS PRN
Status: DISCONTINUED | OUTPATIENT
Start: 2024-05-12 | End: 2024-05-12

## 2024-05-12 RX ORDER — AMOXICILLIN 250 MG
1 CAPSULE ORAL NIGHTLY PRN
Status: DISCONTINUED | OUTPATIENT
Start: 2024-05-12 | End: 2024-05-12

## 2024-05-12 RX ORDER — DOCUSATE SODIUM 100 MG/1
200 CAPSULE, LIQUID FILLED ORAL 2 TIMES DAILY
Status: DISCONTINUED | OUTPATIENT
Start: 2024-05-12 | End: 2024-05-12

## 2024-05-12 RX ORDER — DIPHENHYDRAMINE HCL 25 MG
25 CAPSULE ORAL EVERY 4 HOURS PRN
Status: DISCONTINUED | OUTPATIENT
Start: 2024-05-12 | End: 2024-05-15 | Stop reason: HOSPADM

## 2024-05-12 RX ADMIN — PRENATAL VITAMINS-IRON FUMARATE 27 MG IRON-FOLIC ACID 0.8 MG TABLET 1 TABLET: at 08:05

## 2024-05-12 RX ADMIN — NIFEDIPINE 30 MG: 30 TABLET, FILM COATED, EXTENDED RELEASE ORAL at 08:05

## 2024-05-12 RX ADMIN — OXYCODONE HYDROCHLORIDE 5 MG: 5 TABLET ORAL at 01:05

## 2024-05-12 RX ADMIN — OXYCODONE HYDROCHLORIDE 10 MG: 5 TABLET ORAL at 07:05

## 2024-05-12 RX ADMIN — DOCUSATE SODIUM 200 MG: 100 CAPSULE, LIQUID FILLED ORAL at 08:05

## 2024-05-12 RX ADMIN — IBUPROFEN 600 MG: 600 TABLET, FILM COATED ORAL at 09:05

## 2024-05-12 RX ADMIN — FERROUS SULFATE TAB 325 MG (65 MG ELEMENTAL FE) 1 EACH: 325 (65 FE) TAB at 08:05

## 2024-05-12 RX ADMIN — IBUPROFEN 600 MG: 600 TABLET, FILM COATED ORAL at 08:05

## 2024-05-12 RX ADMIN — ACETAMINOPHEN 975 MG: 325 TABLET, FILM COATED ORAL at 08:05

## 2024-05-12 RX ADMIN — IBUPROFEN 600 MG: 600 TABLET, FILM COATED ORAL at 03:05

## 2024-05-12 RX ADMIN — DOCUSATE SODIUM 200 MG: 100 CAPSULE, LIQUID FILLED ORAL at 09:05

## 2024-05-12 NOTE — ANESTHESIA PROCEDURE NOTES
Intubation    Date/Time: 5/11/2024 6:45 PM    Performed by: Christiano Sarkar CRNA  Authorized by: Mo Lynn MD    Intubation:     Induction:  Rapid sequence induction    Intubated:  Postinduction    Mask Ventilation:  Not attempted    Attempts:  1    Attempted By:  CRNA    Method of Intubation:  Direct    Blade:  Priyanka 3    Laryngeal View Grade: Grade I - full view of cords      Difficult Airway Encountered?: No      Complications:  None    Airway Device:  Oral endotracheal tube    Airway Device Size:  6.5    Style/Cuff Inflation:  Cuffed (inflated to minimal occlusive pressure)    Tube secured:  21    Secured at:  The lips    Placement Verified By:  Capnometry    Complicating Factors:  None    Findings Post-Intubation:  Atraumatic/condition of teeth unchanged and BS equal bilateral

## 2024-05-12 NOTE — L&D DELIVERY NOTE
[facility] - Labor and Delivery   Section   Operative Note    SUMMARY     Date of Procedure: 2024     Procedure: Procedure(s) (LRB):   SECTION (N/A)    Surgeons and Role:     * Harris Alexander MD - Primary     * Graciela Brown MD - Assisting    Pre-Operative Diagnosis:  premature rupture of membranes, antepartum [O42.919]  Previous  delivery affecting pregnancy [O34.219]  Fetal demise before 22 weeks with retention of dead fetus [O36.4XX0]  Oligohydramnios antepartum, second trimester, fetus 2 [O41.02X2]  Chronic hypertension affecting pregnancy [O10.919]  Antepartum hemorrhage, second trimester [O46.92]   premature rupture of membranes (PPROM) with unknown onset of labor [O42.919]  Twin gestation with unknown number of placentas and amniotic sacs in second trimester [O30.002]    Post-Operative Diagnosis: Post-Op Diagnosis Codes:     *  premature rupture of membranes, antepartum [O42.919]     * Previous  delivery affecting pregnancy [O34.219]     * Fetal demise before 22 weeks with retention of dead fetus [O36.4XX0]     * Oligohydramnios antepartum, second trimester, fetus 2 [O41.02X2]     * Chronic hypertension affecting pregnancy [O10.919]     * Antepartum hemorrhage, second trimester [O46.92]     *  premature rupture of membranes (PPROM) with unknown onset of labor [O42.919]     * Twin gestation with unknown number of placentas and amniotic sacs in second trimester [O30.002]    Anesthesia: General    Technical Procedures Used:   Emergent Repeat Low Transverse  Section via Pfannenstiel skin incision           Description of the Findings of the Procedure:   Due to patient actively bleeding, decision was was made to proceed with general anesthesia  Pfannenstiel Incision made through the skin down through the transverse fascial. The rectus muscles were  in the midline and the peritoneum was entered  bluntly  Low Transverse Incision made 2 cm above the lower uterine segment and extended cranially and caudally with blunt dissection.   Bloody amniotic fluid was noted with a large blood clot present upon entry into the uterus.    Infant B was delivered from vertex presentation.  Cord clamped immediately and  handed to attending nurse.   Cord segment was taken. Fetus A was delivered en kris with the placenta attached.    Complications: No    Blood Loss: 370 cc     With patient in supine position, the legs are  and Hart Catheter placed and positioning to supine done.   Abdomen prepped with Betadine poured on the abdomen.  Time out taken with OR team members.  Once general anesthesia was administered, Pfannenstiel Incision made through the skin down through the transverse fascial. The rectus muscles were  in the midline and the peritoneum was entered bluntly.   no adhesions noted.  The lower uterine segment and position of the fetus identified.   Low Transverse Incision made 2 cm above the lower uterine segment and extended cranially and caudally with blunt dissection.   Bloody amniotic fluid was noted with a large blood clot present upon entry into the uterus.  Infant B was delivered from vertex presentation.  Cord clamped immediately and  handed to attending nurse.   Cord segment was taken. Fetus A was delivered en kris with the placenta attached.  The uterus was exteriorized.  The edges of the uterine incision are grasped with Merritt clamps at the angles and the inferior and superior midline edges of the incision.    Closure with running lock 0 Monocryl, starting at the left angle and tying on the right.  A second 0 Monocryl was used for an imbricating layer over the hysterotomy.  Observation for hemostasis with suture of any bleeding along the hysterotomy line.   With good hemostasis noted, the anterior pelvis is rinsed with sterile saline.   Right and left adnexa with normal  "anatomy.     Closure of the abdomen with 2 0 Vicryl running of the peritoneum and rectus muscles, fascial closure with 0 Vicryl starting at the each angle and tying the knot in the midline.  3-0 plain gut was used to reapproximate the subcutaneous tissue.  Skin closure with 4 0 Monocryl subcuticular.  Wound dressed with Pressure ABD dressing.         Specimens:   Specimen (24h ago, onward)       Start     Ordered    24  Specimen to Pathology Gynecology and Obstetrics  Once        Comments: Fetal demise (16 weeks EGA)     References:    Click here for ordering Quick Tip   Question Answer Comment   Procedure Type: Gynecology and Obstetrics    Specimen Source Placenta    Specimen Source Fetus    Specimen Class: Complex case/Special    Clinical Information: Twin IUP 24&4 pprom and fetal demise of baby B / placental abbruption    Release to patient Immediate        24                    Condition: Stable    VTE Risk Mitigation (From admission, onward)           Ordered     IP VTE HIGH RISK PATIENT  Once         24     Place sequential compression device  Until discontinued         24     Place STEF hose  Until discontinued         24                    Disposition: PACU - hemodynamically stable.    Attestation: Fabián Mckeon, COURTNEY Bautista [[id number]]   Delivery Information for A Darryl Mckeon    Birth information:  YOB: 2024   Time of birth: 6:48 PM   Sex: male   Head Delivery Date/Time: 2024  6:48 PM   Delivery type: , Low Transverse   Gestational Age: 24w4d        Delivery Providers    Delivering clinician: Harris Alexander MD           Measurements    Weight: 860 g  Weight (lbs): 1 lb 14.3 oz  Length: 34 cm  Length (in): 13.39"  Head circumference: 23 cm         Apgars    Living status: Living  Apgar Component Scores:  1 min.:  5 min.:  10 min.:  15 min.:  20 min.:    Skin color:  1  1  2  2     Heart " rate:  1  1  2  2     Reflex irritability:  1  1  1  1     Muscle tone:  0  0  0  0     Respiratory effort:  0  0  0  1     Total:  3  3  5  6     Apgars assigned by: LONG GRIFFITH         Operative Delivery    Forceps attempted?: No  Vacuum extractor attempted?: No         Shoulder Dystocia    Shoulder dystocia present?: No                 Interventions/Resuscitation    Method: Surfactant, NICU Attended, Umbilical Catheter, Intubation, PPV, Deep Suctioning       Cord    Vessels: 3 vessels  Delayed Cord Clamping?: No  Cord Clamped Date/Time: 2024  6:48 PM  Gases Sent?: Yes  Stem Cell Collection (by MD): No       Placenta    Placenta delivery date/time: 2024 1848  Placenta removal: Manual removal  Placenta appearance: Abnormal  Placenta disposition: Pathology           Labor Events:       labor: No     Labor Onset Date/Time:         Dilation Complete Date/Time:         Start Pushing Date/Time:       Rupture Date/Time: 24  0100 (pt stated)         Rupture type: PPROM (Premature Prolonged Rupture)         Fluid Amount:       Fluid Color: Clear, Brown       steroids: Full Course     Antibiotics given for GBS: No     Induction:       Indications for induction:        Augmentation:       Indications for augmentation:       Labor complications: Abruptio Placenta     Additional complications:          Cervical ripening:                     Delivery:      Episiotomy: None     Indication for Episiotomy:       Perineal Lacerations:   Repaired:      Periurethral Laceration:   Repaired:     Labial Laceration:   Repaired:     Sulcus Laceration:   Repaired:     Vaginal Laceration:   Repaired:     Cervical Laceration:   Repaired:     Repair suture:       Repair # of packets:       Last Value - EBL - Nursing (mL):       Sum - EBL - Nursing (mL): 0     Last Value - EBL - Anesthesia (mL):      Calculated QBL (mL): 370      Running total QBL (mL): 370      Vaginal Sweep Performed:       [facility]ount  Correct:         Other providers:       Anesthesia    Method: General          Details (if applicable):  Trial of Labor No    Categorization: Repeat    Priority: Emergency   Indications for : Abruption   Incision Type: low transverse     Additional  information:  Forceps:    Vacuum:    Breech:    Observed anomalies    Other (Comments):            Aniceto Mckeon, KRYS Mcdaniel Lily RAMON [[id number]]   Delivery Information for B Unknown Lily Mckeon    Birth information:  YOB: 2024   Time of birth: 6:49 PM   Sex: child   Head Delivery Date/Time: 2024  6:49 PM   Delivery type: , Low Transverse   Gestational Age: 24w4d        Delivery Providers    Delivering clinician: Harris Alexander MD           Measurements    Weight:   Length:          Apgars    Living status: Fetal Demise  Apgar Component Scores:  1 min.:  5 min.:  10 min.:  15 min.:  20 min.:    Skin color:  0  0  0  0  0    Heart rate:  0  0  0  0  0    Reflex irritability:  0  0  0  0  0    Muscle tone:  0  0  0  0  0    Respiratory effort:  0  0  0  0  0    Total:  0  0  0  0  0             Operative Delivery    Forceps attempted?: No  Vacuum extractor attempted?: No         Shoulder Dystocia    Shoulder dystocia present?: No                 Interventions/Resuscitation           Cord    No data filed       Placenta    Placenta delivery date/time: 2024 1849  Placenta removal: Manual removal  Placenta appearance: Abnormal  Placenta disposition: Pathology           Labor Events:       labor: No     Labor Onset Date/Time:         Dilation Complete Date/Time:         Start Pushing Date/Time:       Rupture Date/Time: 24  0100 (pt stated)         Rupture type: PPROM (Premature Prolonged Rupture)         Fluid Amount:       Fluid Color: Clear, Brown       steroids: Full Course     Antibiotics given for GBS:       Induction:       Indications for induction:         Augmentation:       Indications for augmentation:       Labor complications: Abruptio Placenta     Additional complications:          Cervical ripening:                     Delivery:      Episiotomy: None     Indication for Episiotomy:       Perineal Lacerations:   Repaired:      Periurethral Laceration:   Repaired:     Labial Laceration:   Repaired:     Sulcus Laceration:   Repaired:     Vaginal Laceration:   Repaired:     Cervical Laceration:   Repaired:     Repair suture:       Repair # of packets:       Last Value - EBL - Nursing (mL):       Sum - EBL - Nursing (mL): 0     Last Value - EBL - Anesthesia (mL):      Calculated QBL (mL): 370      Running total QBL (mL): 370      Vaginal Sweep Performed:       S[facility]ount Correct:         Other providers:       Anesthesia    Method: General          Details (if applicable):  Trial of Labor No    Categorization: Repeat    Priority: Emergency   Indications for : Abruption   Incision Type: low transverse     Additional  information:  Forceps:    Vacuum:    Breech:    Observed anomalies    Other (Comments):

## 2024-05-12 NOTE — PLAN OF CARE
Problem:  Fall Injury Risk  Goal: Absence of Fall, Infant Drop and Related Injury  Outcome: Progressing     Problem: Adult Inpatient Plan of Care  Goal: Plan of Care Review  Outcome: Progressing  Goal: Patient-Specific Goal (Individualized)  Outcome: Progressing  Goal: Absence of Hospital-Acquired Illness or Injury  Outcome: Progressing  Goal: Optimal Comfort and Wellbeing  Outcome: Progressing  Goal: Readiness for Transition of Care  Outcome: Progressing     Problem: Infection  Goal: Absence of Infection Signs and Symptoms  Outcome: Progressing     Problem: Prelabor Rupture of Membranes  Goal: Delayed Delivery with Absence of Infection  Outcome: Progressing     Problem: Wound  Goal: Optimal Coping  Outcome: Progressing  Goal: Optimal Functional Ability  Outcome: Progressing  Goal: Absence of Infection Signs and Symptoms  Outcome: Progressing  Goal: Improved Oral Intake  Outcome: Progressing  Goal: Optimal Pain Control and Function  Outcome: Progressing  Goal: Skin Health and Integrity  Outcome: Progressing  Goal: Optimal Wound Healing  Outcome: Progressing     Problem: Postpartum ( Delivery)  Goal: Successful Parent Role Transition  Outcome: Progressing  Goal: Hemostasis  Outcome: Progressing  Goal: Effective Bowel Elimination  Outcome: Progressing  Goal: Fluid and Electrolyte Balance  Outcome: Progressing  Goal: Absence of Infection Signs and Symptoms  Outcome: Progressing  Goal: Anesthesia/Sedation Recovery  Outcome: Progressing  Goal: Optimal Pain Control and Function  Outcome: Progressing  Goal: Nausea and Vomiting Relief  Outcome: Progressing  Goal: Effective Urinary Elimination  Outcome: Progressing  Goal: Effective Oxygenation and Ventilation  Outcome: Progressing

## 2024-05-12 NOTE — ANESTHESIA POSTPROCEDURE EVALUATION
Anesthesia Post Evaluation    Patient: Lily Mckeon    Procedure(s) Performed: Procedure(s) (LRB):   SECTION (N/A)    Final Anesthesia Type: general      Patient location during evaluation: PACU  Patient participation: Yes- Able to Participate  Level of consciousness: awake and alert  Post-procedure vital signs: reviewed and stable  Pain management: adequate  Airway patency: patent    PONV status at discharge: No PONV  Anesthetic complications: no    Becky-operative Events Comments: Emergency C/S - no time for neuraxial - General anesthestic   Cardiovascular status: hemodynamically stable  Respiratory status: unassisted  Hydration status: euvolemic  Follow-up not needed.              Vitals Value Taken Time   /76 24 0728   Temp 36.9 °C (98.4 °F) 24 0728   Pulse 85 24 0858   Resp 18 24 0728   SpO2 99 % 24 0858         Event Time   Out of Recovery 2024 20:55:00         Pain/Jean Score: Pain Rating Prior to Med Admin: 7 (2024  8:53 AM)

## 2024-05-12 NOTE — PROGRESS NOTES
"POSTPARTUM PROGRESS NOTE    Subjective:     23 y.o.  s/p RLTCS at 24w4d on 2024 due to concerns for placental abruption, POD#1.   Patient resting comfortably in bed. Patient reports pain in incision site, but is otherwise doing well. RN was giving meds while I was in the room. Patient reports no bleeding at this time.   Encouraged to ambulate as tolerated later today. Voiding spontaneously.  Pt denies HA, lightheadedness, chest pain, SOB, fever, chills, N/V.  Infant in NICU.  Pt reports feeling a little anxious about her baby and asked if he is doing well.       Objective:      Temp:  [97.7 °F (36.5 °C)-98.9 °F (37.2 °C)] 97.7 °F (36.5 °C)  Pulse:  [] 113  Resp:  [18-25] 18  SpO2:  [92 %-100 %] 99 %  BP: (102-142)/(53-86) 118/74    Lung: Normal respiratory effort   Abdomen: Soft, appropriately tender   Uterus: Firm, no fundal tenderness, firm at the level of the umbilicus   Incision: Bandage in place without shadowing   : Deferred   Extremities: No edema     Lab Review    No results for input(s): "NA", "K", "CL", "CO2", "BUN", "CREATININE", "GLU", "PROT", "BILITOT", "ALKPHOS", "ALT", "AST", "MG", "PHOS" in the last 168 hours.    Recent Labs   Lab 24  0551 05/10/24  1942   WBC 9.67 10.06   HGB 10.3* 10.2*   HCT 30.3* 30.7*   MCV 87.3 89.5    278         I/O    Intake/Output Summary (Last 24 hours) at 2024 0508  Last data filed at 2024 204  Gross per 24 hour   Intake 400 ml   Output 620 ml   Net -220 ml        Assessment and Plan:   Lily Mckeon is a 23 y.o.  s/p RLTCS due to concerns for placental abruption on 2023, POD# 1. Clinically stable and doing well.      Continue routine postpartum care. Encourage ambulation   Antepartum H/H 10.2/30.7 -->  mL-->postpartum H/H 10.0/30.7.Repeat CBC tomorrow AM. Continue po iron.  Continue to monitor for signs/sx of anemia.   Discussed with RN about calling NICU to give parents an update on baby's health " status.    Pt seen and discussed with staff.      Mari Willoughby MD  [facility] Family Medicine Resident, [facility]

## 2024-05-13 LAB
ABO + RH BLD: NORMAL
ABO + RH BLD: NORMAL
BASOPHILS # BLD AUTO: 0.05 X10(3)/MCL
BASOPHILS NFR BLD AUTO: 0.5 %
BLD PROD TYP BPU: NORMAL
BLD PROD TYP BPU: NORMAL
BLOOD UNIT EXPIRATION DATE: NORMAL
BLOOD UNIT EXPIRATION DATE: NORMAL
BLOOD UNIT TYPE CODE: 5100
BLOOD UNIT TYPE CODE: 5100
CROSSMATCH INTERPRETATION: NORMAL
CROSSMATCH INTERPRETATION: NORMAL
DISPENSE STATUS: NORMAL
DISPENSE STATUS: NORMAL
EOSINOPHIL # BLD AUTO: 0.15 X10(3)/MCL (ref 0–0.9)
EOSINOPHIL NFR BLD AUTO: 1.5 %
ERYTHROCYTE [DISTWIDTH] IN BLOOD BY AUTOMATED COUNT: 12.6 % (ref 11.5–17)
HCT VFR BLD AUTO: 25.3 % (ref 37–47)
HGB BLD-MCNC: 8.2 G/DL (ref 12–16)
IMM GRANULOCYTES # BLD AUTO: 0.04 X10(3)/MCL (ref 0–0.04)
IMM GRANULOCYTES NFR BLD AUTO: 0.4 %
LYMPHOCYTES # BLD AUTO: 3.25 X10(3)/MCL (ref 0.6–4.6)
LYMPHOCYTES NFR BLD AUTO: 33.3 %
MCH RBC QN AUTO: 29.3 PG (ref 27–31)
MCHC RBC AUTO-ENTMCNC: 32.4 G/DL (ref 33–36)
MCV RBC AUTO: 90.4 FL (ref 80–94)
MONOCYTES # BLD AUTO: 0.49 X10(3)/MCL (ref 0.1–1.3)
MONOCYTES NFR BLD AUTO: 5 %
NEUTROPHILS # BLD AUTO: 5.79 X10(3)/MCL (ref 2.1–9.2)
NEUTROPHILS NFR BLD AUTO: 59.3 %
NRBC BLD AUTO-RTO: 0 %
PLATELET # BLD AUTO: 240 X10(3)/MCL (ref 130–400)
PMV BLD AUTO: 8.6 FL (ref 7.4–10.4)
RBC # BLD AUTO: 2.8 X10(6)/MCL (ref 4.2–5.4)
UNIT NUMBER: NORMAL
UNIT NUMBER: NORMAL
WBC # SPEC AUTO: 9.77 X10(3)/MCL (ref 4.5–11.5)

## 2024-05-13 PROCEDURE — 36415 COLL VENOUS BLD VENIPUNCTURE: CPT

## 2024-05-13 PROCEDURE — 25000003 PHARM REV CODE 250

## 2024-05-13 PROCEDURE — 27000492 HC SLEEVE, SCD T/L

## 2024-05-13 PROCEDURE — 25000003 PHARM REV CODE 250: Performed by: OBSTETRICS & GYNECOLOGY

## 2024-05-13 PROCEDURE — 11000001 HC ACUTE MED/SURG PRIVATE ROOM

## 2024-05-13 PROCEDURE — 85025 COMPLETE CBC W/AUTO DIFF WBC: CPT

## 2024-05-13 RX ORDER — OXYCODONE HYDROCHLORIDE 5 MG/1
5 TABLET ORAL EVERY 6 HOURS PRN
Status: DISCONTINUED | OUTPATIENT
Start: 2024-05-13 | End: 2024-05-13

## 2024-05-13 RX ORDER — ACETAMINOPHEN 500 MG
1000 TABLET ORAL EVERY 8 HOURS
Status: DISCONTINUED | OUTPATIENT
Start: 2024-05-13 | End: 2024-05-13

## 2024-05-13 RX ORDER — HYDROCODONE BITARTRATE AND ACETAMINOPHEN 10; 325 MG/1; MG/1
1 TABLET ORAL EVERY 6 HOURS PRN
Status: DISCONTINUED | OUTPATIENT
Start: 2024-05-13 | End: 2024-05-15 | Stop reason: HOSPADM

## 2024-05-13 RX ORDER — CALCIUM CARBONATE 200(500)MG
1000 TABLET,CHEWABLE ORAL ONCE
Status: COMPLETED | OUTPATIENT
Start: 2024-05-13 | End: 2024-05-13

## 2024-05-13 RX ORDER — HYDROCODONE BITARTRATE AND ACETAMINOPHEN 5; 325 MG/1; MG/1
1 TABLET ORAL EVERY 6 HOURS PRN
Status: DISCONTINUED | OUTPATIENT
Start: 2024-05-13 | End: 2024-05-15 | Stop reason: HOSPADM

## 2024-05-13 RX ORDER — HYDROCODONE BITARTRATE AND ACETAMINOPHEN 5; 325 MG/1; MG/1
1 TABLET ORAL ONCE
Status: DISCONTINUED | OUTPATIENT
Start: 2024-05-13 | End: 2024-05-15 | Stop reason: HOSPADM

## 2024-05-13 RX ORDER — FAMOTIDINE 20 MG/1
20 TABLET, FILM COATED ORAL 2 TIMES DAILY
Status: DISCONTINUED | OUTPATIENT
Start: 2024-05-13 | End: 2024-05-15 | Stop reason: HOSPADM

## 2024-05-13 RX ADMIN — NIFEDIPINE 30 MG: 30 TABLET, FILM COATED, EXTENDED RELEASE ORAL at 09:05

## 2024-05-13 RX ADMIN — FAMOTIDINE 20 MG: 20 TABLET, FILM COATED ORAL at 10:05

## 2024-05-13 RX ADMIN — IBUPROFEN 600 MG: 600 TABLET, FILM COATED ORAL at 10:05

## 2024-05-13 RX ADMIN — HYDROCODONE BITARTRATE AND ACETAMINOPHEN 1 TABLET: 10; 325 TABLET ORAL at 06:05

## 2024-05-13 RX ADMIN — IBUPROFEN 600 MG: 600 TABLET, FILM COATED ORAL at 04:05

## 2024-05-13 RX ADMIN — CALCIUM CARBONATE (ANTACID) CHEW TAB 500 MG 1000 MG: 500 CHEW TAB at 09:05

## 2024-05-13 RX ADMIN — PRENATAL VITAMINS-IRON FUMARATE 27 MG IRON-FOLIC ACID 0.8 MG TABLET 1 TABLET: at 09:05

## 2024-05-13 RX ADMIN — FERROUS SULFATE TAB 325 MG (65 MG ELEMENTAL FE) 1 EACH: 325 (65 FE) TAB at 09:05

## 2024-05-13 RX ADMIN — FAMOTIDINE 20 MG: 20 TABLET, FILM COATED ORAL at 09:05

## 2024-05-13 RX ADMIN — IBUPROFEN 600 MG: 600 TABLET, FILM COATED ORAL at 09:05

## 2024-05-13 RX ADMIN — DOCUSATE SODIUM 200 MG: 100 CAPSULE, LIQUID FILLED ORAL at 10:05

## 2024-05-13 RX ADMIN — DOCUSATE SODIUM 200 MG: 100 CAPSULE, LIQUID FILLED ORAL at 09:05

## 2024-05-13 NOTE — PROGRESS NOTES
Inpatient Nutrition Evaluation    Admit Date: 4/16/2024   Total duration of encounter: 27 days   Patient Age: 23 y.o.    Nutrition Recommendation/Prescription     Continue regular diet as tolerated  Continue post-op bowel regimen  Monitor labs, intake and weight    Nutrition Assessment     Chart Review    Reason Seen: length of stay and follow-up    Malnutrition Screening Tool Results   Have you recently lost weight without trying?: No  Have you been eating poorly because of a decreased appetite?: No   MST Score: 0   Diagnosis:  PPROM in the setting of twin gestation with fetal demise of Twin A  Anemia   Abnormal glucose testing    Relevant Medical History: HTN    Scheduled Medications:  docusate sodium, 200 mg, BID  famotidine, 20 mg, BID  ferrous sulfate, 1 tablet, Daily  HYDROcodone-acetaminophen, 1 tablet, Once  ibuprofen, 600 mg, Q6H  mupirocin, , BID  NIFEdipine, 30 mg, Daily  prenatal vitamin, 1 tablet, Daily    Continuous Infusions:   PRN Medications:   Current Facility-Administered Medications:     benzocaine-lanolin, , Topical (Top), QID PRN    bisacodyL, 10 mg, Rectal, Once PRN    carboprost, 250 mcg, Intramuscular, Q15 Min PRN    diphenhydrAMINE, 25 mg, Oral, Q4H PRN    diphenoxylate-atropine 2.5-0.025 mg, 2 tablet, Oral, Q6H PRN    HYDROcodone-acetaminophen, 1 tablet, Oral, Q6H PRN    HYDROcodone-acetaminophen, 1 tablet, Oral, Q6H PRN    hydrocortisone, , Rectal, TID PRN    lanolin, , Topical (Top), PRN    magnesium hydroxide 400 mg/5 ml, 30 mL, Oral, BID PRN    measles, mumps and rubella vaccine, 0.5 mL, Subcutaneous, vaccine x 1 dose    miSOPROStoL, 800 mcg, Oral, Once PRN    ondansetron, 8 mg, Oral, Q8H PRN    pramoxine-hydrocortisone, , Rectal, QID PRN    senna-docusate 8.6-50 mg, 1 tablet, Oral, Nightly PRN    simethicone, 1 tablet, Oral, Q6H PRN    sodium chloride 0.9%, 10 mL, Intravenous, PRN    DIPH,PERTUSS(ACELL),TET VACCINE (ADULT)(BOOSTRIX,ADACEL), 0.5 mL, Intramuscular, vaccine x 1 dose     "tranexamic acid (CYKLOKAPRON) infusion, 1,000 mg, Intravenous, Q30 Min PRN    Recent Labs   Lab 05/07/24  0551 05/10/24  1942 05/12/24  0456 05/13/24  0411   WBC 9.67 10.06 14.03* 9.77   HGB 10.3* 10.2* 10.0* 8.2*   HCT 30.3* 30.7* 30.7* 25.3*     Nutrition Orders:  Diet Adult Regular Double Portions      Appetite/Oral Intake: good/% of meals  Factors Affecting Nutritional Intake: none identified  Food/Rastafarian/Cultural Preferences: unable to obtain  Food Allergies:  pineapple  Last Bowel Movement: 05/11/24  Wound(s):      Comments    4/22: Pt NPO this AM for 3 hr glucose test, diet advanced to regular this afternoon. Per notes, no N/V or abdominal pain, constipation resolved. No reports of decreased appetite. Per MST, no reports of decreased appetite or unintentional weight loss PTA. Weight appears stable PTA per EMR weight history. Recent 4# weight loss likely fluid loss.    4/29: Pt unable to answer questions due to language barrier. Nsg noted appetite good. Still with nausea in the mornings but zofran is helping. Bowel regimen is helping with constipation.     5/6: Pt tolerating diet well. Recommend to continue bowel regimen to assist with recent constipation.     5/13: Pt now s/p RLTCS at 24 4/7 weeks gestation on 5/11 due to placenta abruption. Pt with some soreness in throat from intubation. Recommend bowel regimen for post-op.     Anthropometrics    Height: 5' 3" (160 cm), Height Method: Stated  Last Weight: 83.7 kg (184 lb 9.6 oz) (05/01/24 1243), Weight Method: Standard Scale  BMI (Calculated): 32.7  BMI Classification: not applicable-pregnancy     Ideal Body Weight (IBW), Female: 115 lb     % Ideal Body Weight, Female (lb): 160 %                             Usual Weight Provided By: EMR weight history    Wt Readings from Last 5 Encounters:   05/01/24 83.7 kg (184 lb 9.6 oz)   04/09/24 85.3 kg (188 lb)   04/08/24 85.3 kg (188 lb 0.8 oz)   03/26/24 84.5 kg (186 lb 6.4 oz)   03/19/24 84.9 kg (187 lb " 4.5 oz)     Weight Change(s) Since Admission: no new wts  Wt Readings from Last 1 Encounters:   05/01/24 1243 83.7 kg (184 lb 9.6 oz)   04/16/24 0900 83.5 kg (184 lb)   Admit Weight: 83.5 kg (184 lb) (04/16/24 0900), Weight Method: Standard Scale    Patient Education     Not applicable.    Nutrition Goals & Monitoring     Dietitian will monitor: food and beverage intake, weight, and gastrointestinal profile    Nutrition Risk/Follow-Up: low (follow-up in 5-7 days)  Patients assigned 'low nutrition risk' status do not qualify for a full nutritional assessment but will be monitored and re-evaluated in a 5-7 day time period. Please consult if re-evaluation needed sooner.

## 2024-05-13 NOTE — PROGRESS NOTES
POSTPARTUM PROGRESS NOTE    Subjective:     23 y.o.  s/p RLTCS at 24w4d on 2024 due to placenta abruption, POD#2.   Patient reports burning pain in throat and neck pain x 1 day. She did undergo general anesthesia and was intubated prior to her C section. Abdominal pain also reported at incision site. Not well controlled with pain meds at this time. Lochia minimal and stable.  Voiding spontaneously.  Pt denies HA, lightheadedness, chest pain, SOB, fever, chills, N/V.  Infant in NICU.        Objective:      Temp:  [97.9 °F (36.6 °C)-99.2 °F (37.3 °C)] 98.2 °F (36.8 °C)  Pulse:  [] 93  Resp:  [18-20] 18  SpO2:  [98 %-99 %] 98 %  BP: (105-121)/(65-82) 114/77    Lung: No increased work of breathing. Lungs clear to auscultation bilaterally.   Abdomen: Soft, ND, tenderness to area surrounding incision site   Uterus: Firm, fundus palpated below the level of the umbilicus   Incision: Clean, dry and intact without erythema or induration   : Deferred   Extremities: No edema in BLE, no calf swelling or erythema     Lab Review    Recent Labs   Lab 05/10/24  1942 24  0456 24  0411   WBC 10.06 14.03* 9.77   HGB 10.2* 10.0* 8.2*   HCT 30.7* 30.7* 25.3*   MCV 89.5 92.7 90.4    290 240         I/O  No intake or output data in the 24 hours ending 24 0857       Assessment and Plan:   Lily Mckeon is a 23 y.o.  s/p RLTCS due to placental abruption on 2023, POD# 2.   Pain not well controlled. Patient afebrile with Tmax of 99.2F. No leukocytosis on CBC this AM - low suspicion for infection at this time. Will adjust pain regimen and continue to monitor.     Continue routine postpartum care.   Encouraged ambulation   Antepartum H/H 10.2/30.7 -->  mL-->postpartum H/H 10.0/30.7. Repeat CBC this AM /13 - H/H of 8.2/25.3. Continue po iron.  Continue to monitor for signs/sx of anemia.   New pain regimen - scheduled Motrin 600mg q6, Norco 5mg q6 prn for moderate pain, Norco  10mg q6 prn for severe pain. Will continue to monitor pain and adjust meds as needed.  Bowel regimen - Docusate 200mg BID  Throat lozenges ordered to provide relief, Pepcid ordered.       Pt seen and discussed with staff.      Mari Willoughby MD  [facility] Family Medicine Resident, -

## 2024-05-13 NOTE — CONSULTS
Verified her face sheet information:      Living Situation:      Resides at [address]  [place] [address] [place] [address], phone: [phone] (home).         Met with Mom in her postpartum room and Mom was agreeable to assessment at this time. Infant in NICU name: Alexander Fierro Jr. FOB: Alexander Fierro [phone]; involved and signing birth certificate. Verified demographic information with Mom. Mom reported having a 6yo daughter at home. Mom does not work outside of the home and FOB works as a . Mom reported that they own one vehicle that is used for FOB during the day for work. Explained Medicaid transportation and provided this resource to assist in NICU visitation. Mom wishes to breast feed/pump and does not have a breast pump at home. [facility] resources provided to assist in obtaining a breast pump. Mom reported having social support to lean on however minimal family support. Mom denied any mental health needs however voiced that she and FOB were experiencing increased stress due to infant's NICU stay and loss of other twin. Provided emotional support and resources/information including [employer], PPD/PPA, [facility] hotline, car seat safety and safe sleep practices. Explained that Ewelina Montelongo LCSW would be following infant throughout his NICU stay for additional support. Mom verbalized understanding and denied any further social or resource needs at this time.       Mom reported that she and FOB did not name infant that passed at 13 weeks during this twin gestation. Explained to Mom the options that she had regarding this infant's remains: the hospital would provide cremation however she would not receive the ashes or she could arrange  services for infant and [employer] would provide financial assistance. Mom reported that she would like the hospital to provide the cremation for infant and that she understood she would not receive any remains from this process. Mom was  agreeable to a referral to Ewelina's [facility] for emotional support throughout this journey. Provided emotional support and resources to Mom. Mom denied any further social or resource needs to be addressed at this time.     I will be available for any further assistance in this case throughout Mom's hospital admission.

## 2024-05-13 NOTE — MEDICAL/APP STUDENT
Post Vaginal Delivery Progress Note:     Subjective  Lily Mckeon is a 23 y.o.  s/p RLTCS due to placenta abruption at 24w4d on 2024, POD # 2.    This morning, patient reports 7/10 pain to lower abdomen near incision site and also endorses throat pain. Lochia is minimal similar to prior menstruation. Ambulating well. Passed flatus. Voiding and having bowel movements with no difficulty. Tolerating a regular diet. Infant is in NICU    Denies fevers, chills, headache, blurry vision, nausea, vomiting, dizziness, or syncope.  Denies chest pain, shortness of breath, RUQ pain, or calf pain.    Objective:  Vital signs in last 24 hours:     Vitals:    24 2018 24 2357 24 0400 24 0719   BP: 114/75 121/77 120/82 114/77   Patient Position:    Lying   Pulse: 102 101 88 93   Resp:    18   Temp: 99.2 °F (37.3 °C) 98.4 °F (36.9 °C) 97.9 °F (36.6 °C) 98.2 °F (36.8 °C)   TempSrc: Oral Oral Oral Oral   SpO2:       Weight:       Height:           Physical Exam  General: Alert and oriented, in no acute distress   Lungs: Clear to auscultation bilaterally   Heart:: Regular rate and rhythm, S1, S2 normal, no murmur   Abdomen Soft, non-distended. Incision clean dry and intact, no erythema   Uterine Fundus:  Firm, below the umbilicus***   Lochia:  deferred   DVT Evaluation: No cords or calf tenderness.  No significant calf/ankle edema.   Edema: None     Labs  Trended Lab Data:  Recent Labs   Lab 05/10/24  1942 24  0456 24  0411   WBC 10.06 14.03* 9.77   HGB 10.2* 10.0* 8.2*   HCT 30.7* 30.7* 25.3*    290 240   MCV 89.5 92.7 90.4   RDW 12.6 12.4 12.6       Medications      acetaminophen  975 mg Oral Q8H    docusate sodium  200 mg Oral BID    ferrous sulfate  1 tablet Oral Daily    ibuprofen  600 mg Oral Q6H    mupirocin   Nasal BID    NIFEdipine  30 mg Oral Daily    prenatal vitamin  1 tablet Oral Daily           Current Facility-Administered Medications:     benzocaine-lanolin, ,  Topical (Top), QID PRN    bisacodyL, 10 mg, Rectal, Once PRN    carboprost, 250 mcg, Intramuscular, Q15 Min PRN    diphenhydrAMINE, 25 mg, Oral, Q4H PRN    diphenoxylate-atropine 2.5-0.025 mg, 2 tablet, Oral, Q6H PRN    hydrocortisone, , Rectal, TID PRN    lanolin, , Topical (Top), PRN    magnesium hydroxide 400 mg/5 ml, 30 mL, Oral, BID PRN    measles, mumps and rubella vaccine, 0.5 mL, Subcutaneous, vaccine x 1 dose    miSOPROStoL, 800 mcg, Oral, Once PRN    ondansetron, 8 mg, Oral, Q8H PRN    pramoxine-hydrocortisone, , Rectal, QID PRN    senna-docusate 8.6-50 mg, 1 tablet, Oral, Nightly PRN    simethicone, 1 tablet, Oral, Q6H PRN    sodium chloride 0.9%, 10 mL, Intravenous, PRN    DIPH,PERTUSS(ACELL),TET VACCINE (ADULT)(BOOSTRIX,ADACEL), 0.5 mL, Intramuscular, vaccine x 1 dose    tranexamic acid (CYKLOKAPRON) infusion, 1,000 mg, Intravenous, Q30 Min PRN    Assessment/Plan  23 y.o.  s/p RLTCS  due to placenta abruption at ***/***/***, POD#2. Clinically stable.    Doing well. Continue routine postpartum care.   Encourage ambulation.  Antepartum H/H ***  --> QBL *** mL --> postpartum H/H pending, ordered***.  Asymptomatic. Not tachycardic.***  Continue to monitor for symptoms of anemia.  Breastfeeding***, continue to encourage.  Pain: Controlled with current pain regimen.***   PPBCM: ***  Dispo: continue to monitor and anticipate discharge on PPD 2 if meeting all discharge criteria.    Patient and plan discussed with staff.    Wicho Teixeira MD  [employer] [facility], PGY-2

## 2024-05-14 LAB
ABO + RH BLD: NORMAL
ABO + RH BLD: NORMAL
ALBUMIN SERPL-MCNC: 2.6 G/DL (ref 3.5–5)
ALBUMIN/GLOB SERPL: 0.8 RATIO (ref 1.1–2)
ALP SERPL-CCNC: 117 UNIT/L (ref 40–150)
ALT SERPL-CCNC: 44 UNIT/L (ref 0–55)
AST SERPL-CCNC: 46 UNIT/L (ref 5–34)
BILIRUB SERPL-MCNC: 0.2 MG/DL
BLD PROD TYP BPU: NORMAL
BLD PROD TYP BPU: NORMAL
BLOOD UNIT EXPIRATION DATE: NORMAL
BLOOD UNIT EXPIRATION DATE: NORMAL
BLOOD UNIT TYPE CODE: 5100
BLOOD UNIT TYPE CODE: 5100
BUN SERPL-MCNC: 7.1 MG/DL (ref 7–18.7)
CALCIUM SERPL-MCNC: 8 MG/DL (ref 8.4–10.2)
CHLORIDE SERPL-SCNC: 110 MMOL/L (ref 98–107)
CO2 SERPL-SCNC: 22 MMOL/L (ref 22–29)
CREAT SERPL-MCNC: 0.52 MG/DL (ref 0.55–1.02)
CROSSMATCH INTERPRETATION: NORMAL
CROSSMATCH INTERPRETATION: NORMAL
DISPENSE STATUS: NORMAL
DISPENSE STATUS: NORMAL
GFR SERPLBLD CREATININE-BSD FMLA CKD-EPI: >60 ML/MIN/1.73/M2
GLOBULIN SER-MCNC: 3.2 GM/DL (ref 2.4–3.5)
GLUCOSE SERPL-MCNC: 86 MG/DL (ref 74–100)
POTASSIUM SERPL-SCNC: 3.9 MMOL/L (ref 3.5–5.1)
PROT SERPL-MCNC: 5.8 GM/DL (ref 6.4–8.3)
SODIUM SERPL-SCNC: 141 MMOL/L (ref 136–145)
UNIT NUMBER: NORMAL
UNIT NUMBER: NORMAL

## 2024-05-14 PROCEDURE — 25000003 PHARM REV CODE 250: Performed by: OBSTETRICS & GYNECOLOGY

## 2024-05-14 PROCEDURE — 36415 COLL VENOUS BLD VENIPUNCTURE: CPT | Performed by: STUDENT IN AN ORGANIZED HEALTH CARE EDUCATION/TRAINING PROGRAM

## 2024-05-14 PROCEDURE — 25000003 PHARM REV CODE 250

## 2024-05-14 PROCEDURE — 11000001 HC ACUTE MED/SURG PRIVATE ROOM

## 2024-05-14 PROCEDURE — 80053 COMPREHEN METABOLIC PANEL: CPT | Performed by: STUDENT IN AN ORGANIZED HEALTH CARE EDUCATION/TRAINING PROGRAM

## 2024-05-14 RX ADMIN — IBUPROFEN 600 MG: 600 TABLET, FILM COATED ORAL at 09:05

## 2024-05-14 RX ADMIN — IBUPROFEN 600 MG: 600 TABLET, FILM COATED ORAL at 04:05

## 2024-05-14 RX ADMIN — FAMOTIDINE 20 MG: 20 TABLET, FILM COATED ORAL at 07:05

## 2024-05-14 RX ADMIN — ONDANSETRON 8 MG: 4 TABLET, ORALLY DISINTEGRATING ORAL at 11:05

## 2024-05-14 RX ADMIN — HYDROCODONE BITARTRATE AND ACETAMINOPHEN 1 TABLET: 10; 325 TABLET ORAL at 07:05

## 2024-05-14 RX ADMIN — DOCUSATE SODIUM 200 MG: 100 CAPSULE, LIQUID FILLED ORAL at 07:05

## 2024-05-14 RX ADMIN — FERROUS SULFATE TAB 325 MG (65 MG ELEMENTAL FE) 1 EACH: 325 (65 FE) TAB at 07:05

## 2024-05-14 RX ADMIN — FAMOTIDINE 20 MG: 20 TABLET, FILM COATED ORAL at 08:05

## 2024-05-14 RX ADMIN — HYDROCODONE BITARTRATE AND ACETAMINOPHEN 1 TABLET: 10; 325 TABLET ORAL at 02:05

## 2024-05-14 RX ADMIN — PRENATAL VITAMINS-IRON FUMARATE 27 MG IRON-FOLIC ACID 0.8 MG TABLET 1 TABLET: at 07:05

## 2024-05-14 RX ADMIN — DOCUSATE SODIUM 200 MG: 100 CAPSULE, LIQUID FILLED ORAL at 08:05

## 2024-05-14 RX ADMIN — NIFEDIPINE 30 MG: 30 TABLET, FILM COATED, EXTENDED RELEASE ORAL at 07:05

## 2024-05-14 RX ADMIN — IBUPROFEN 600 MG: 600 TABLET, FILM COATED ORAL at 10:05

## 2024-05-14 RX ADMIN — IBUPROFEN 600 MG: 600 TABLET, FILM COATED ORAL at 03:05

## 2024-05-14 NOTE — PROGRESS NOTES
POSTPARTUM PROGRESS NOTE    Subjective:     23 y.o.  s/p RLTCS at 24w4d on 2024 due to placenta abruption, POD#3. Doing well postoperatively.     This morning, patient complained of nausea and vomiting after taking her scheduled pain medications. She also endorses new right upper quadrant pain. No association with food intake.   She states that throat pain is better this AM. Incision pain is better controlled with current pain regimen. Ambulating well. Voiding spontaneously and having bowel movements without any difficulty. Tolerating a regular diet. Reports lochia is minimal, decreased from the day prior.     Pt denies HA, lightheadedness, chest pain, SOB, fever, chills, N/V.  Infant in NICU.        Objective:      Temp:  [98 °F (36.7 °C)-98.9 °F (37.2 °C)] 98.7 °F (37.1 °C)  Pulse:  [] 97  Resp:  [18] 18  SpO2:  [99 %] 99 %  BP: (107-132)/(73-88) 132/88    Lung: No increased work of breathing. Lungs clear to auscultation bilaterally.   Abdomen: Soft, ND, tenderness to palpation to RUQ and bilateral lower abdominal quadrants. + Weber sign    Uterus: Firm, fundus palpated below the level of the umbilicus   Incision: Clean, dry and intact without erythema or induration   : Deferred   Extremities: No edema in BLE, no calf swelling or erythema     Lab Review    Recent Labs   Lab 05/10/24  1942 24  0456 24  0411   WBC 10.06 14.03* 9.77   HGB 10.2* 10.0* 8.2*   HCT 30.7* 30.7* 25.3*   MCV 89.5 92.7 90.4    290 240         I/O  No intake or output data in the 24 hours ending 24 6602       Assessment and Plan:   Lily Mckeon is a 23 y.o.  s/p RLTCS due to placental abruption on 2023, POD# 3. Clinically stable.    Continue routine postpartum care.   Encouraged ambulation   Will order a RUQ US and CMP to further evaluate RUQ pain.   Antepartum H/H 10.2/30.7 -->  mL-->postpartum H/H 10.0/30.7. Repeat CBC on  - H/H of 8.2/25.3. Continue po iron.   Continue to monitor for signs/sx of anemia.   Continue current pain regimen - scheduled Motrin 600mg q6, Norco 5mg q6 prn for moderate pain, Norco 10mg q6 prn for severe pain.   Bowel regimen - Docusate 200mg BID  Throat lozenges    PPBCM: educated patient on the different options for birth control management. Decision pending      Pt seen and discussed with staff.      Mari Willoughby MD  [facility] Family Medicine Resident, [facility]

## 2024-05-14 NOTE — MEDICAL/APP STUDENT
Post  Delivery Progress Note:     Subjective  Lily Mckeon is a 23 y.o.   s/p RLTCS at 24w4d due to placenta abruption on 24, POD # 3.  Doing well postoperatively.    This morning, patient complained of nausea and vomiting after taking her scheduled morning medications. Endorses new RUQ pain that exacerbates when ambulating, denies during meals. Continues to complain of throat and lower abdominal similar to the day prior, but states is well controlled on current meds. Continues to ambulating well. Voiding and passing bowel movements without any difficulty. Tolerating a regular diet. Reports lochia is minimal, decreased from the day prior.      Denies fevers, chills, headache, blurry vision, dizziness, or syncope.  Denies chest pain, shortness of breath, or calf pain.    Objective:  Vital signs in last 24 hours:     Vitals:    24 0228 24 0335 24 0730 24 0744   BP:  119/82 132/88    Patient Position:       Pulse:  80 97    Resp: 18   18   Temp:  98.1 °F (36.7 °C) 98.7 °F (37.1 °C)    TempSrc:  Oral Oral    SpO2:       Weight:       Height:           Physical Exam:  General: Alert and oriented, in no acute distress   Lungs: Clear to auscultation bilaterally   Heart:: Regular rate and rhythm, S1, S2 normal, no murmur   Abdomen Soft, non-distended, tenderness to RUQ, + Weber sign, tenderness to bilateral lower quadrants    Bowel Sounds: Active   Uterine Fundus:  Firm, below the umbilicus   Incision: clean, dry, intact   Lochia  deferred   DVT Evaluation: No cords or calf tenderness.  No significant calf/ankle edema.   Extremities: Normal, atraumatic, non-edematous, SCDs in place***     Labs  Trended Lab Data:  Recent Labs   Lab 05/10/24  1942 24  0456 24  0411   WBC 10.06 14.03* 9.77   HGB 10.2* 10.0* 8.2*   HCT 30.7* 30.7* 25.3*    290 240   MCV 89.5 92.7 90.4   RDW 12.6 12.4 12.6       Medications      docusate sodium  200 mg Oral BID    famotidine   20 mg Oral BID    ferrous sulfate  1 tablet Oral Daily    HYDROcodone-acetaminophen  1 tablet Oral Once    ibuprofen  600 mg Oral Q6H    mupirocin   Nasal BID    NIFEdipine  30 mg Oral Daily    prenatal vitamin  1 tablet Oral Daily           Current Facility-Administered Medications:     benzocaine-lanolin, , Topical (Top), QID PRN    bisacodyL, 10 mg, Rectal, Once PRN    carboprost, 250 mcg, Intramuscular, Q15 Min PRN    diphenhydrAMINE, 25 mg, Oral, Q4H PRN    diphenoxylate-atropine 2.5-0.025 mg, 2 tablet, Oral, Q6H PRN    HYDROcodone-acetaminophen, 1 tablet, Oral, Q6H PRN    HYDROcodone-acetaminophen, 1 tablet, Oral, Q6H PRN    hydrocortisone, , Rectal, TID PRN    lanolin, , Topical (Top), PRN    magnesium hydroxide 400 mg/5 ml, 30 mL, Oral, BID PRN    measles, mumps and rubella vaccine, 0.5 mL, Subcutaneous, vaccine x 1 dose    miSOPROStoL, 800 mcg, Oral, Once PRN    ondansetron, 8 mg, Oral, Q8H PRN    pramoxine-hydrocortisone, , Rectal, QID PRN    senna-docusate 8.6-50 mg, 1 tablet, Oral, Nightly PRN    simethicone, 1 tablet, Oral, Q6H PRN    sodium chloride 0.9%, 10 mL, Intravenous, PRN    DIPH,PERTUSS(ACELL),TET VACCINE (ADULT)(BOOSTRIX,ADACEL), 0.5 mL, Intramuscular, vaccine x 1 dose    tranexamic acid (CYKLOKAPRON) infusion, 1,000 mg, Intravenous, Q30 Min PRN    Assessment/Plan  23 y.o.  s/p RLTCS at 24w4d on 24 due to placenta abruption POD # 3. Clinically stable.     Doing well. Continue routine postpartum care.   Encourage ambulation.  Will order RUQ US and CMP to further evaluate RUQ tenderness  Antepartum H/H 10.2/30.7  -->  mL --> postpartum H/H 10.0/30.7, repeat CBC on 24 - H/H of 8.2/25.5. continue po iron. Continue to monitor   Continue pain regimen - scheduled Motrin 600mg q6, Norco 5mg q6 prn for moderate pain, Norco 10mg q6 prn for severe pain. Will continue to monitor pain and adjust meds as needed.   Bowel regimen - Docusate 200mg BID  Throat lozenges ordered to  provide relief, pepcid ordered.  PPBCM: educated patient on the different options for birth control management.    Patient and plan discussed with staff.    Arminda Hutchison MS3

## 2024-05-14 NOTE — PLAN OF CARE
Problem:  Fall Injury Risk  Goal: Absence of Fall, Infant Drop and Related Injury  Outcome: Not Progressing     Problem: Adult Inpatient Plan of Care  Goal: Plan of Care Review  Outcome: Not Progressing  Goal: Patient-Specific Goal (Individualized)  Outcome: Not Progressing  Goal: Absence of Hospital-Acquired Illness or Injury  Outcome: Not Progressing  Goal: Optimal Comfort and Wellbeing  Outcome: Not Progressing  Goal: Readiness for Transition of Care  Outcome: Not Progressing     Problem: Infection  Goal: Absence of Infection Signs and Symptoms  Outcome: Not Progressing     Problem: Prelabor Rupture of Membranes  Goal: Delayed Delivery with Absence of Infection  Outcome: Not Progressing     Problem: Wound  Goal: Optimal Coping  Outcome: Not Progressing  Goal: Optimal Functional Ability  Outcome: Not Progressing  Goal: Absence of Infection Signs and Symptoms  Outcome: Not Progressing  Goal: Improved Oral Intake  Outcome: Not Progressing  Goal: Optimal Pain Control and Function  Outcome: Not Progressing  Goal: Skin Health and Integrity  Outcome: Not Progressing  Goal: Optimal Wound Healing  Outcome: Not Progressing     Problem: Postpartum ( Delivery)  Goal: Successful Parent Role Transition  Outcome: Not Progressing  Goal: Hemostasis  Outcome: Not Progressing  Goal: Effective Bowel Elimination  Outcome: Not Progressing  Goal: Fluid and Electrolyte Balance  Outcome: Not Progressing  Goal: Absence of Infection Signs and Symptoms  Outcome: Not Progressing  Goal: Anesthesia/Sedation Recovery  Outcome: Not Progressing  Goal: Optimal Pain Control and Function  Outcome: Not Progressing  Goal: Nausea and Vomiting Relief  Outcome: Not Progressing  Goal: Effective Urinary Elimination  Outcome: Not Progressing  Goal: Effective Oxygenation and Ventilation  Outcome: Not Progressing

## 2024-05-15 VITALS
OXYGEN SATURATION: 99 % | BODY MASS INDEX: 32.71 KG/M2 | WEIGHT: 184.63 LBS | HEIGHT: 63 IN | DIASTOLIC BLOOD PRESSURE: 86 MMHG | SYSTOLIC BLOOD PRESSURE: 127 MMHG | TEMPERATURE: 99 F | HEART RATE: 96 BPM | RESPIRATION RATE: 18 BRPM

## 2024-05-15 PROBLEM — O31.22X1: Status: RESOLVED | Noted: 2024-04-23 | Resolved: 2024-05-15

## 2024-05-15 PROCEDURE — 25000003 PHARM REV CODE 250: Performed by: OBSTETRICS & GYNECOLOGY

## 2024-05-15 PROCEDURE — 25000003 PHARM REV CODE 250

## 2024-05-15 RX ORDER — LIDOCAINE 50 MG/G
1 PATCH TOPICAL
Status: DISCONTINUED | OUTPATIENT
Start: 2024-05-15 | End: 2024-05-15

## 2024-05-15 RX ORDER — IBUPROFEN 600 MG/1
600 TABLET ORAL EVERY 6 HOURS PRN
Qty: 30 TABLET | Refills: 0 | Status: SHIPPED | OUTPATIENT
Start: 2024-05-15

## 2024-05-15 RX ORDER — FERROUS SULFATE 324(65)MG
324 TABLET, DELAYED RELEASE (ENTERIC COATED) ORAL EVERY OTHER DAY
Qty: 30 TABLET | Refills: 2 | Status: SHIPPED | OUTPATIENT
Start: 2024-05-15

## 2024-05-15 RX ORDER — NIFEDIPINE 30 MG/1
30 TABLET, EXTENDED RELEASE ORAL DAILY
Qty: 30 TABLET | Refills: 3 | Status: SHIPPED | OUTPATIENT
Start: 2024-05-16 | End: 2025-05-16

## 2024-05-15 RX ORDER — LIDOCAINE 50 MG/G
1 PATCH TOPICAL
Status: DISCONTINUED | OUTPATIENT
Start: 2024-05-15 | End: 2024-05-15 | Stop reason: HOSPADM

## 2024-05-15 RX ORDER — HYDROCODONE BITARTRATE AND ACETAMINOPHEN 10; 325 MG/1; MG/1
1 TABLET ORAL EVERY 12 HOURS PRN
Qty: 12 TABLET | Refills: 0 | Status: SHIPPED | OUTPATIENT
Start: 2024-05-15

## 2024-05-15 RX ORDER — DOCUSATE SODIUM 100 MG/1
200 CAPSULE, LIQUID FILLED ORAL 2 TIMES DAILY PRN
Qty: 30 CAPSULE | Refills: 0 | Status: SHIPPED | OUTPATIENT
Start: 2024-05-15

## 2024-05-15 RX ORDER — LIDOCAINE 50 MG/G
1 PATCH TOPICAL DAILY PRN
Qty: 10 PATCH | Refills: 0 | Status: SHIPPED | OUTPATIENT
Start: 2024-05-15

## 2024-05-15 RX ADMIN — PRENATAL VITAMINS-IRON FUMARATE 27 MG IRON-FOLIC ACID 0.8 MG TABLET 1 TABLET: at 09:05

## 2024-05-15 RX ADMIN — DOCUSATE SODIUM 200 MG: 100 CAPSULE, LIQUID FILLED ORAL at 09:05

## 2024-05-15 RX ADMIN — IBUPROFEN 600 MG: 600 TABLET, FILM COATED ORAL at 05:05

## 2024-05-15 RX ADMIN — IBUPROFEN 600 MG: 600 TABLET, FILM COATED ORAL at 01:05

## 2024-05-15 RX ADMIN — NIFEDIPINE 30 MG: 30 TABLET, FILM COATED, EXTENDED RELEASE ORAL at 09:05

## 2024-05-15 RX ADMIN — FERROUS SULFATE TAB 325 MG (65 MG ELEMENTAL FE) 1 EACH: 325 (65 FE) TAB at 09:05

## 2024-05-15 RX ADMIN — FAMOTIDINE 20 MG: 20 TABLET, FILM COATED ORAL at 09:05

## 2024-05-15 NOTE — DISCHARGE SUMMARY
Delivery Discharge Summary  Obstetrics      Primary OB Clinician: Marcio Amador MD    Admission date: 2024  Discharge date: 05/15/2024    Disposition: To home, self care    Discharge Diagnosis List:      Patient Active Problem List   Diagnosis    1. Fetal demise (twin A) before 22 weeks with retention of dead fetus    2. Oligohydramnios antepartum, second trimester, fetus 2    3. Twin gestation with unknown number of placentas and amniotic sacs in second trimester    4. Chronic hypertension affecting pregnancy    5. BMI affecting pregnancy in second trimester    Antepartum hemorrhage, second trimester    Pregnancy with 20 completed weeks gestation     premature rupture of membranes (PPROM) with unknown onset of labor- Twin B    Previous  delivery affecting pregnancy    Placental abruption affecting delivery    Status post repeat low transverse  section       Procedure: , due to placental abruption    Hospital Course:  Lily Mckeon is a 23 y.o. now , POD#4 s/p RLTCS for placental abruption. She was initially admitted on 2024 for PPROM at 21w0d in the setting of mono-di twin gestation with retained demise of twin A (diagnosed at 16 weeks). MFM and NICU were consulted. During her antepartum course, she received a course of latency antibiotics, BMZ for fetal lung maturity, and magnesium sulfate for fetal neuroprotection. Throughout her antepartum course, she had occasional episodes of spotting which initially remained stable but then developed active bleeding on  for which emergent  delivery under general anesthesia was done for placental abruption. Delivery was otherwise uncomplicated. Please see delivery note for further details.    Her postpartum course was overall uncomplicated. On POD #3, patient reported right upper and bilateral lower abdominal pain, unassociated with meals but worse with ambulation. She also reported one episode of  "vomiting few minutes after receiving her morning Norco dose. A RUQ U/S was done to rule out any gallbladder disease. Results revealed multiple tiny gallstones with no evidence of biliary dilatation or findings suggestive of cholecystitis. She was given an abdominal binder and lidocaine patch which helped relieve her pain.      On discharge day, patient's pain is controlled with oral pain medications. Pt is tolerating ambulation without SOB or CP, and regular diet without N/V. Reports lochia is minimal. Denies any HA, vision changes, fever, chills, LE swelling. Denies any breast pain/soreness.    Pt in stable condition and ready for discharge. She has been instructed to start and/or continue medications and follow up with her obstetrics provider as listed below. PreE precautions reviewed.    Pertinent studies:  CBC  Recent Labs   Lab 05/10/24  1942 24  0456 24  0411   WBC 10.06 14.03* 9.77   HGB 10.2* 10.0* 8.2*   HCT 30.7* 30.7* 25.3*   MCV 89.5 92.7 90.4    290 240        Exam:  No increased work of breathing. Lungs clear to auscultation bilaterally.   Regular rate and rhythm.  Abdomen soft, appropriately tender post-op, no rebound or guarding.  Fundus firm and palpable below the umbilicus.  Incision c/d/I with no erythema or fluctuance  Bilateral lower extremity no edema or tenderness      There is no immunization history for the selected administration types on file for this patient.        Aniceto Mckeon, A Boy Lily [[id number]]     Delivery:    Episiotomy: None   Lacerations:     Repair suture:     Repair # of packets:     Blood loss (ml):       Birth information:  YOB: 2024   Time of birth: 6:48 PM   Sex: male   Delivery type: , Low Transverse   Gestational Age: 24w4d     Measurements    Weight: 860 g  Weight (lbs): 1 lb 14.3 oz  Length: 34 cm  Length (in): 13.39"  Head circumference: 23 cm         Delivery Clinician: Delivery Providers    Delivering clinician: " Harris Alexander MD          Additional  information:  Forceps:    Vacuum:    Breech:    Observed anomalies      Living?:     Apgars    Living status: Living  Apgar Component Scores:  1 min.:  5 min.:  10 min.:  15 min.:  20 min.:    Skin color:  1  1  2  2     Heart rate:  1  1  2  2     Reflex irritability:  1  1  1  1     Muscle tone:  0  0  0  0     Respiratory effort:  0  0  0  1     Total:  3  3  5  6     Apgars assigned by: LONG GRIFFITH         Placenta: Delivered:       appearance     Aniceto Mckeon, B Unknown Aura  [[id number]]     Delivery:    Episiotomy: None   Lacerations:     Repair suture:     Repair # of packets:     Blood loss (ml):       Birth information:  YOB: 2024   Time of birth: 6:49 PM   Sex: child   Delivery type: , Low Transverse   Gestational Age: 24w4d     Measurements    Weight:   Length:          Delivery Clinician: Delivery Providers    Delivering clinician: Harris Alexander MD          Additional  information:  Forceps:    Vacuum:    Breech:    Observed anomalies      Living?:     Apgars    Living status: Fetal Demise  Apgar Component Scores:  1 min.:  5 min.:  10 min.:  15 min.:  20 min.:    Skin color:  0  0  0  0  0    Heart rate:  0  0  0  0  0    Reflex irritability:  0  0  0  0  0    Muscle tone:  0  0  0  0  0    Respiratory effort:  0  0  0  0  0    Total:  0  0  0  0  0             Placenta: Delivered:       appearance    Patient Instructions:   Current Discharge Medication List        START taking these medications    Details   docusate sodium (COLACE) 100 MG capsule Take 2 capsules (200 mg total) by mouth 2 (two) times daily as needed for Constipation.  Qty: 30 capsule, Refills: 0      ferrous sulfate 324 mg (65 mg iron) TbEC Take 1 tablet (324 mg total) by mouth every other day.  Qty: 30 tablet, Refills: 2      HYDROcodone-acetaminophen (NORCO)  mg per tablet Take 1 tablet by mouth every 12 (twelve) hours as needed for Pain (para  dolor severo).  Qty: 12 tablet, Refills: 0    Comments: Quantity prescribed more than 7 day supply? No      ibuprofen (ADVIL,MOTRIN) 600 MG tablet Take 1 tablet (600 mg total) by mouth every 6 (six) hours as needed for Pain (para dolor pawane o moderado).  Qty: 30 tablet, Refills: 0      LIDOcaine (LIDODERM) 5 % Place 1 patch onto the skin daily as needed. Place patch to right side where patient feels pain. Leave on for 12 hours and remove for 12 hours.  Qty: 10 patch, Refills: 0           CONTINUE these medications which have CHANGED    Details   NIFEdipine (PROCARDIA-XL) 30 MG (OSM) 24 hr tablet Take 1 tablet (30 mg total) by mouth once daily.  Qty: 30 tablet, Refills: 3    Comments: .           CONTINUE these medications which have NOT CHANGED    Details   prenatal vit no.124/iron/folic (PRENATAL VITAMIN ORAL) Take by mouth.           STOP taking these medications       aspirin (BUFFERIN) 325 MG Tab Comments:   Reason for Stopping:         pyridoxine, vitamin B6, (B-6) 25 MG Tab Comments:   Reason for Stopping:               No discharge procedures on file.     Follow-up Information       [facility]. Go to.    Specialty: Family Medicine  Why: Go to 1 week incision check appointment scheduled on 5/23/2024 at 240pm.  Your 6 week postpartum appointment will be scheduled after that visit  Contact information:  [address] [facility] [address] [phone]                          - Patient desires Nexplanon at post partum visit.  - Continue iron supplementation, continue Procardia 30 qd.  - Pain regimen sent to pharmacy as listed above.   - Follow-up will be at [facility] family medicine clinic.  Follow-up will be in approximately 1 week for incision check and then in 6 weeks for a postpartum visit.    - ER and preE precautions were reviewed with the patient and she was given opportunity to ask questions.   - Stable for discharge.     Mari Willoughby MD  [facility]  Medicine Resident, BRIEN Teixeira MD  [employer] [facility]etric[facility] Gyne[facility], PGY-2

## 2024-05-16 LAB — PSYCHE PATHOLOGY RESULT: NORMAL

## 2024-05-22 NOTE — TELEPHONE ENCOUNTER
Letter to excuse patient from work drafted. Copy printed, signed and handed to patient.     Conrado Tony MD, MBS  [facility] Family Medicine Resident, HO-I  12:01 PM.

## 2024-05-23 ENCOUNTER — OFFICE VISIT (OUTPATIENT)
Dept: FAMILY MEDICINE | Facility: CLINIC | Age: 24
End: 2024-05-23
Payer: MEDICAID

## 2024-05-23 VITALS
RESPIRATION RATE: 18 BRPM | WEIGHT: 181.19 LBS | HEIGHT: 63 IN | SYSTOLIC BLOOD PRESSURE: 105 MMHG | OXYGEN SATURATION: 99 % | TEMPERATURE: 99 F | HEART RATE: 76 BPM | DIASTOLIC BLOOD PRESSURE: 76 MMHG | BODY MASS INDEX: 32.11 KG/M2

## 2024-05-23 DIAGNOSIS — I10 HYPERTENSION, UNSPECIFIED TYPE: ICD-10-CM

## 2024-05-23 PROCEDURE — 99214 OFFICE O/P EST MOD 30 MIN: CPT | Mod: PBBFAC

## 2024-05-23 NOTE — PROGRESS NOTES
[facility] postpartum clinic progress note    ID:  Lily Mckeon   MRN:  [id number]     2024    Chief Complaint:  Hospital follow-up    History of Present Illness:  Lily Mckeon is a 23 y.o.  presents for one-week follow-up status post prolonged hospitalization, admitted on 2024 for PPROM at 21 weeks with twin gestation, suffering single fetal intrauterine demise.  She underwent  section 2024 under general anesthesia.  Single fetus remains viable currently admitted in [facility] in ICU.  Mom is pumping breast milk and providing 2 baby.  She was taking p.o. iron for postop anemia.  She was having mild incisional pain but no rash or drainage.  She denies any headaches visual changes, chest pain shortness for breath, right upper quadrant pain, lower extremity swelling.  Pregnancy was complicated by maternal hypertension for which she is currently taking Procardia 30 mg.  Denies access to blood pressure cuff.  She is undecided about contraception. Overall she is in good spirits.      Current Outpatient Medications:     docusate sodium (COLACE) 100 MG capsule, Take 2 capsules (200 mg total) by mouth 2 (two) times daily as needed for Constipation., Disp: 30 capsule, Rfl: 0    ferrous sulfate 324 mg (65 mg iron) TbEC, Take 1 tablet (324 mg total) by mouth every other day., Disp: 30 tablet, Rfl: 2    HYDROcodone-acetaminophen (NORCO)  mg per tablet, Take 1 tablet by mouth every 12 (twelve) hours as needed for Pain (para dolor severo)., Disp: 12 tablet, Rfl: 0    ibuprofen (ADVIL,MOTRIN) 600 MG tablet, Take 1 tablet (600 mg total) by mouth every 6 (six) hours as needed for Pain (para dolor leve o moderado)., Disp: 30 tablet, Rfl: 0    LIDOcaine (LIDODERM) 5 %, Place 1 patch onto the skin daily as needed. Place patch to right side where patient feels pain. Leave on for 12 hours and remove for 12 hours., Disp: 10 patch, Rfl: 0    NIFEdipine (PROCARDIA-XL) 30 MG (OSM) 24 hr tablet, Take  "1 tablet (30 mg total) by mouth once daily., Disp: 30 tablet, Rfl: 3    prenatal vit no.124/iron/folic (PRENATAL VITAMIN ORAL), Take by mouth., Disp: , Rfl:     Review of patient's allergies indicates:   Allergen Reactions    Pineapple Hives and Shortness Of Breath         Review of Systems:  See HPI      Physical Exam:  /76 (BP Location: Right arm, Patient Position: Sitting, BP Method: Large (Automatic))   Pulse 76   Temp 98.8 °F (37.1 °C) (Oral)   Resp 18   Ht 5' 3" (1.6 m)   Wt 82.2 kg (181 lb 3.2 oz)   LMP  (LMP Unknown)   SpO2 99%   BMI 32.10 kg/m²     Gen-NAD  HEENT-no scleral icterus  CV-S1-S2, regular rate and rhythm, no MRG, no extremity swelling  Resp-breathing nonlabored, lungs clear bilaterally  GI-abdomen obese, soft and nontender, Pfannenstiel incision is well healed. Mild suprapubic tenderness.  Neuro- alert and responding appropriately  Skin- see above    Assessment/Plan:    1. Encounter for postpartum visit  - Doing well  - Continue prescribed meds for incisional pain, ambulate as tolerated  - Continue pumping and PNVs    2. Hypertension, unspecified type  Well controlled  May continue current medications while breastfeeding        Tim Villa MD  [employer]  Resident HO3  "

## 2024-05-24 ENCOUNTER — HOSPITAL ENCOUNTER (EMERGENCY)
Facility: HOSPITAL | Age: 24
Discharge: HOME | End: 2024-05-24
Attending: STUDENT IN AN ORGANIZED HEALTH CARE EDUCATION/TRAINING PROGRAM | Admitting: STUDENT IN AN ORGANIZED HEALTH CARE EDUCATION/TRAINING PROGRAM
Payer: MEDICAID

## 2024-05-24 VITALS
OXYGEN SATURATION: 99 % | TEMPERATURE: 98 F | HEART RATE: 81 BPM | SYSTOLIC BLOOD PRESSURE: 122 MMHG | RESPIRATION RATE: 16 BRPM | DIASTOLIC BLOOD PRESSURE: 87 MMHG

## 2024-05-24 LAB
BACTERIA #/AREA URNS AUTO: ABNORMAL /HPF
BILIRUB UR QL STRIP.AUTO: NEGATIVE
CLARITY UR: ABNORMAL
COLOR UR AUTO: ABNORMAL
GLUCOSE UR QL STRIP: NEGATIVE
HGB UR QL STRIP: ABNORMAL
KETONES UR QL STRIP: NEGATIVE
LEUKOCYTE ESTERASE UR QL STRIP: NEGATIVE
MUCOUS THREADS URNS QL MICRO: ABNORMAL /LPF
NITRITE UR QL STRIP: NEGATIVE
PH UR STRIP: 6 [PH]
PROT UR QL STRIP: NEGATIVE
RBC #/AREA URNS AUTO: ABNORMAL /HPF
SP GR UR STRIP.AUTO: >=1.03 (ref 1–1.03)
SQUAMOUS #/AREA URNS LPF: ABNORMAL /HPF
UROBILINOGEN UR STRIP-ACNC: 0.2
WBC #/AREA URNS AUTO: ABNORMAL /HPF

## 2024-05-24 PROCEDURE — 99283 EMERGENCY DEPT VISIT LOW MDM: CPT

## 2024-05-24 PROCEDURE — 81001 URINALYSIS AUTO W/SCOPE: CPT | Performed by: NURSE PRACTITIONER

## 2024-05-24 NOTE — ED PROVIDER NOTES
SAEED NOTE  [facility]     Admit Date: 2024  SAEED Physician: Donya Biswas, NP  Primary OBGYN:     Admit Diagnosis/Chief Complaint: Incisional pain, heavy vaginal bleeding; s/p repeat CS x 2wk  Discharge Diagnosis:     Chief Complaint   Patient presents with    Incisional Pain    Vaginal Bleeding     PP delivered  via  c/o incisional pain, and heavy vaginal bleeding       Hospital Course:  Lily Mckeon is a 23 y.o.  at s/p repeat CS under general anesthesia 2wks ago in the setting of PPROM at 21wks twin delivery at 24wks with single fetal IUFD presents with complaints of pain at incision and heavy vaginal bleeding. She reports increased abdominal pain described as cramping on yesterday then today noticed an increased in vaginal bleeding. States blood ran down her leg x 1, reddish-brown in color. Denies saturation of any pads since onset at noon today. Denies passage of any clots, large or small; denies foul vaginal odor, incisional dehiscence/redness/drainage. She denies fever, chills, need to increase intake of oral pain medications, N/V, constipation or diarrhea. She reports burning to vaginal area with need to urinate; denies frequency or dysuria.     This IUP was complicated by fetal demise of twin B, PPROM at 21wk twin A, cHTN.      BP (!) 129/90   Pulse 84   Temp 98.2 °F (36.8 °C)   Resp 16   LMP  (LMP Unknown)   SpO2 99%   Temp:  [98.2 °F (36.8 °C)-98.8 °F (37.1 °C)] 98.2 °F (36.8 °C)  Pulse:  [76-84] 84  Resp:  [16-18] 16  SpO2:  [99 %] 99 %  BP: (105-129)/(76-90) 129/90    General: in no apparent distress well developed and well nourished non-toxic in no respiratory distress and acyanotic alert oriented times 3 afebrile normal vitals cooperative  Cardiovascular: regular rate and rhythm  Respiratory: clear to auscultation, no wheezes, rales or rhonchi, symmetric air entry unlabored  Abdominal: soft, nontender,  nondistended, no abnormal masses, no epigastric pain skin scar: transverse well approximated without redness or drainage  Back: lumbar tenderness absent CVA tenderness none suprapubic tenderness absent  Extremeties no redness or tenderness in the calves or thighs no edema  Genitalia: brownish discharge noted to pad ~1/3 covered (amount since onset at noon)    Medical Decision Making:      LABS:   No results found for this or any previous visit (from the past 24 hour(s)).    Imaging Results    None          ASSESMENT and clinical impression: Lily Mckeon is a 23 y.o.   abdominal pain, vaginal bleeding, postoperative repeat .  Discharge Diagnosis/clinical impression: abdominal pain, vaginal bleeding, postoperative repeat    Patient Active Problem List   Diagnosis    1. Fetal demise (twin A) before 22 weeks with retention of dead fetus    2. Oligohydramnios antepartum, second trimester, fetus 2    3. Twin gestation with unknown number of placentas and amniotic sacs in second trimester    4. Chronic hypertension affecting pregnancy    5. BMI affecting pregnancy in second trimester    Antepartum hemorrhage, second trimester    Pregnancy with 20 completed weeks gestation     premature rupture of membranes (PPROM) with unknown onset of labor- Twin B    Previous  delivery affecting pregnancy    Placental abruption affecting delivery    Status post repeat low transverse  section       Status:Stable    Disposition:  discharged to home    Medications:   Continue home medications    Patient Instructions:   Return to ED with worsening pain or bleeding, fever and other any concerns.   She will follow up with her primary OB as scheduled    Donya Biswas NP  OB/GYN Hospitalist  3:35 PM 2024

## 2024-05-24 NOTE — DISCHARGE INSTRUCTIONS
Keep regularly scheduled appointments. Return to hospital for heavy vaginal bleeding, foul discharge, fever, or worsening of signs and symptoms.

## 2024-06-01 ENCOUNTER — PATIENT MESSAGE (OUTPATIENT)
Dept: MATERNAL FETAL MEDICINE | Facility: CLINIC | Age: 24
End: 2024-06-01
Payer: MEDICAID

## 2024-07-11 ENCOUNTER — OFFICE VISIT (OUTPATIENT)
Dept: FAMILY MEDICINE | Facility: CLINIC | Age: 24
End: 2024-07-11

## 2024-07-11 VITALS
SYSTOLIC BLOOD PRESSURE: 126 MMHG | DIASTOLIC BLOOD PRESSURE: 89 MMHG | HEIGHT: 63 IN | BODY MASS INDEX: 33.45 KG/M2 | RESPIRATION RATE: 18 BRPM | TEMPERATURE: 98 F | HEART RATE: 82 BPM | WEIGHT: 188.81 LBS

## 2024-07-11 DIAGNOSIS — K59.00 CONSTIPATION, UNSPECIFIED CONSTIPATION TYPE: ICD-10-CM

## 2024-07-11 DIAGNOSIS — R10.13 EPIGASTRIC PAIN: ICD-10-CM

## 2024-07-11 DIAGNOSIS — Z76.89 ENCOUNTER TO ESTABLISH CARE: ICD-10-CM

## 2024-07-11 DIAGNOSIS — I10 HYPERTENSION, UNSPECIFIED TYPE: ICD-10-CM

## 2024-07-11 DIAGNOSIS — Z30.9 ENCOUNTER FOR CONTRACEPTIVE MANAGEMENT, UNSPECIFIED TYPE: ICD-10-CM

## 2024-07-11 PROCEDURE — 99214 OFFICE O/P EST MOD 30 MIN: CPT | Mod: PBBFAC

## 2024-07-11 RX ORDER — PANTOPRAZOLE SODIUM 40 MG/1
40 TABLET, DELAYED RELEASE ORAL DAILY
Qty: 30 TABLET | Refills: 2 | Status: SHIPPED | OUTPATIENT
Start: 2024-07-11

## 2024-07-11 RX ORDER — DOCUSATE SODIUM 100 MG/1
200 CAPSULE, LIQUID FILLED ORAL 2 TIMES DAILY PRN
Qty: 30 CAPSULE | Refills: 1 | Status: SHIPPED | OUTPATIENT
Start: 2024-07-11

## 2024-07-11 RX ORDER — FAMOTIDINE 20 MG/1
20 TABLET, FILM COATED ORAL NIGHTLY
Qty: 30 TABLET | Refills: 2 | Status: SHIPPED | OUTPATIENT
Start: 2024-07-11

## 2024-07-11 NOTE — PROGRESS NOTES
"[facility] OFFICE VISIT NOTE  Lily Mckeon  [id number]  2024    Chief Complaint   Patient presents with    Postpartum Care     Postpartum follow up, patient states she is feeling pain in upper part of stomach and side for about four weeks now.  Would like the Nexplanon.       Lily Mckeon is a 23 y.o. female  s/p LTCS on 24 for placental abruption of mono-di twin gestation with retained demise of twin A (diagnosed at 16 weeks) presenting to [facility] for 6 weeks post-partum follow up.  Pregnancy was complicated by maternal hypertension for which she is currently taking Procardia 30 mg. Her baby is still in the NICU. She reports having support at home. Denies SI/HI. Desires nexplanon. LMP 24    Reports epigastric abdominal pain x 4 weeks that occurs mostly at night when laying down. Takes OTC pantoprazole with relief but the pain would come back 4-5 hours later. Also reports constipation and has to strain to defecate ( taking iron supplement daily) Reports Hx of treated H pylori x 2 in [facility]. Denies N/V/D      ; [id number] ( Daisha)    Review of Systems   Constitutional:  Negative for appetite change and fever.   Respiratory:  Negative for chest tightness and shortness of breath.    Cardiovascular:  Negative for chest pain and palpitations.   Gastrointestinal:  Negative for diarrhea, nausea and vomiting.        Epigastric pain   Neurological:  Negative for dizziness, weakness, light-headedness and headaches.   Psychiatric/Behavioral:  Negative for suicidal ideas.        Blood pressure 126/89, pulse 82, temperature 98.3 °F (36.8 °C), temperature source Oral, resp. rate 18, height 5' 3" (1.6 m), weight 85.6 kg (188 lb 12.8 oz), last menstrual period 2024, currently breastfeeding.   Physical Exam  Constitutional:       General: She is not in acute distress.     Appearance: She is obese.   Eyes:      Pupils: Pupils are equal, round, and reactive to light. "   Cardiovascular:      Rate and Rhythm: Normal rate and regular rhythm.      Pulses: Normal pulses.      Heart sounds: Normal heart sounds. No murmur heard.     No gallop.   Pulmonary:      Effort: Pulmonary effort is normal. No respiratory distress.      Breath sounds: Normal breath sounds. No wheezing or rales.   Abdominal:      General: Bowel sounds are normal.      Palpations: Abdomen is soft.      Tenderness: There is abdominal tenderness. There is no guarding or rebound.      Comments: Slightly tender over epigastric area ( rest of abdomen appropriately TTP s/p C/S)  Incision: dry, clean, and intact ( healed)   Skin:     Findings: Rash present.   Neurological:      Mental Status: She is alert.         Current Medications:   Current Outpatient Medications   Medication Sig Dispense Refill    docusate sodium (COLACE) 100 MG capsule Take 2 capsules (200 mg total) by mouth 2 (two) times daily as needed for Constipation. 30 capsule 1    famotidine (PEPCID) 20 MG tablet Take 1 tablet (20 mg total) by mouth every evening. 30 tablet 2    ferrous sulfate 324 mg (65 mg iron) TbEC Take 1 tablet (324 mg total) by mouth every other day. 30 tablet 2    NIFEdipine (PROCARDIA-XL) 30 MG (OSM) 24 hr tablet Take 1 tablet (30 mg total) by mouth once daily. 30 tablet 3    pantoprazole (PROTONIX) 40 MG tablet Take 1 tablet (40 mg total) by mouth once daily. 30 tablet 2    prenatal vit no.124/iron/folic (PRENATAL VITAMIN ORAL) Take by mouth.       No current facility-administered medications for this visit.       Assessment:   1. Encounter for postpartum visit   -Doing well  -May resume daily regular activities as tolerated   -Depression screening scale: 0     2. Hypertension, unspecified type   -BP at goal  -Continue procardia 30mg daily     3. Epigastric pain   GERD vs recurrent H pylori infection vs Pancreatitis vs gastric ulcer vs constipation  -Rx sent for Protonix 40 qdaily and Pepcid 20qhs  -H pylori antigen and lipase  ordered  -As pt is self pay, referred to [facility] for US  -Instructed to take ferrous sulfate every other day and no NSAID use. Rx sent for collace     4.    Encounter for contraceptive management           -Desires nexplanon, unable to pay as pt is self pay          -Will get at the [facility] , address given    Orders Placed This Encounter    Helicobacter Pylori Antigen Fecal EIA    Lipase    Ambulatory referral/consult to Family Practice    pantoprazole (PROTONIX) 40 MG tablet    famotidine (PEPCID) 20 MG tablet    docusate sodium (COLACE) 100 MG capsule       No PCP, referral placed to establish care    León Marinelli  [facility] Resident

## 2024-07-12 ENCOUNTER — LACTATION ENCOUNTER (OUTPATIENT)
Dept: INTENSIVE CARE | Facility: OTHER | Age: 24
End: 2024-07-12

## 2024-07-12 NOTE — LACTATION NOTE
"This note was copied from a baby's chart.  Lactation unable to see mom in unit;   Lactation call to mom with [employer]#[id number], using Language Line  Mom reports pumping 3-4xdaily for 15-20min with home "jami" pump and getting only drops of milk. She previously tried power pumping-with no increase in supply. She denies receiving Nexplanon or any other form of birth control at her post partum visit. She does desire increased milk supply and asked for any suggestions. However, infant is now 2mos old and she has not established a  milk supply.   Recommended Pump Plan:  Mom to pump every 3 hours/8 times/24 hours   3 of these-power pumps   5 of these- 30min pump sessions  Mom to consistently do this for 5 days. If supply increases, continue regimen. If no increase in supply-mom may choose to wean from pumping/stop over time slowly if her body does not make any more than drops of milk. Mom denies any other lactation needs. Support provided. Pump to be placed at Alexander' bedside for mom to use on Monday when she visits.   "

## 2024-08-01 ENCOUNTER — LACTATION ENCOUNTER (OUTPATIENT)
Dept: INTENSIVE CARE | Facility: OTHER | Age: 24
End: 2024-08-01

## 2024-08-01 NOTE — LACTATION NOTE
"This note was copied from a baby's chart.  Mother/Baby being followed by lactation.  LC spoke with mother at bedside with video . Mother concerned with very low milk production. Mother stated that she has been pumping every 2-3 hours during the day since baby is born although she admits to sleeping all night without pumping. Mother stated that she has only pumped drops since baby is born. Mother also said that she had low milk production with previous child and that she did not have any breast growth in pregnancy. Mother voiced desire to "try" to increase milk supply for baby. Discussed increasing milk production. Pumping schedule given. Recommended adding power pumping to pump routine 3 x/day. LC to follow up with milk production in one week. [place] handout given. Mother has Motif [id number] personal pump.     Power Pumping: Use the following pumping pattern 1-3 x/day              1. Pump for 20 minutes;rest 10 minutes     2. Pump another 10 minutes; rest 10 minutes   3. Pump again 10 minutes; finish  This provides 40 minutes of pumping in a 60 minute period. At other times during the day, use routine pumping (20-30 minutes). Some women see results in 48 hours and other women may take up to a week to see results. Recommended pumping 8-10 x/day total.   "

## 2024-08-06 ENCOUNTER — LACTATION ENCOUNTER (OUTPATIENT)
Dept: INTENSIVE CARE | Facility: OTHER | Age: 24
End: 2024-08-06

## 2024-08-20 ENCOUNTER — LACTATION ENCOUNTER (OUTPATIENT)
Dept: INTENSIVE CARE | Facility: OTHER | Age: 24
End: 2024-08-20

## 2024-08-20 NOTE — LACTATION NOTE
This note was copied from a baby's chart.   called mother with language line  Ramya #[id number]. Mother stated that she is no longer pumping. Praised for trying to provide breast milk for baby over past 3 mos.  Alisa Velasco, BSN, RNC, IBCLC

## 2024-08-21 ENCOUNTER — LACTATION ENCOUNTER (OUTPATIENT)
Dept: INTENSIVE CARE | Facility: OTHER | Age: 24
End: 2024-08-21

## 2024-08-21 NOTE — LACTATION NOTE
This note was copied from a baby's chart.  Infant remains in open crib. Temperature and vital signs stable. Remains on 1L NC at 100%. No apnea/bradycardia this shift. Tolerating feeds  of EBM 20 and elecare 20 without emesis. Voiding and stooling. No contact with family this shift.

## 2025-02-20 ENCOUNTER — HOSPITAL ENCOUNTER (EMERGENCY)
Facility: HOSPITAL | Age: 25
Discharge: HOME | End: 2025-02-20
Attending: EMERGENCY MEDICINE

## 2025-02-20 VITALS
SYSTOLIC BLOOD PRESSURE: 151 MMHG | BODY MASS INDEX: 35.65 KG/M2 | HEIGHT: 61 IN | DIASTOLIC BLOOD PRESSURE: 102 MMHG | OXYGEN SATURATION: 100 % | TEMPERATURE: 99 F | RESPIRATION RATE: 18 BRPM | HEART RATE: 77 BPM

## 2025-02-20 DIAGNOSIS — R52 PAIN: ICD-10-CM

## 2025-02-20 DIAGNOSIS — S63.501A SPRAIN OF RIGHT WRIST, INITIAL ENCOUNTER: Primary | ICD-10-CM

## 2025-02-20 PROCEDURE — 99283 EMERGENCY DEPT VISIT LOW MDM: CPT | Mod: 25

## 2025-02-20 RX ORDER — TRAMADOL HYDROCHLORIDE 50 MG/1
50 TABLET ORAL EVERY 6 HOURS PRN
Qty: 12 TABLET | Refills: 0 | Status: SHIPPED | OUTPATIENT
Start: 2025-02-20 | End: 2025-02-23

## 2025-02-20 NOTE — Clinical Note
"Lily Echols "Don Mckeon was seen and treated in our emergency department on 2/20/2025.  She may return to work on 02/24/2025.       If you have any questions or concerns, please don't hesitate to call.      Amina Kirby NP"

## 2025-02-20 NOTE — ED PROVIDER NOTES
Encounter Date: 2025       History     Chief Complaint   Patient presents with    Arm Injury     PT to ed via pov with reports being struck by package while at work onto R forearm and R wrist area. Pain 6/10. No obvious injury. Neuro intact.     The patient is a 24 y.o. female with a history of hypertension who presents to the Emergency Department with a chief complaint of right arm pain. Patient states she started with right wrist and right forearm pain that started after a box fell on her arm at work. Symptoms began today and have been constant since onset. Her pain is currently rated as a 7/10 in severity and described as aching with no radiation. Associated symptoms include nothing. Symptoms are aggravated with movement and there are no alleviating factors. The patient denies numbness or tingling to extremity. She reports taking nothing prior to arrival with no relief of symptoms. No other reported symptoms at this time.      The history is provided by the patient. No  was used.   Arm Injury   The incident occurred today. The incident occurred at work. There is an injury to the Right forearm and right wrist. There have been no prior injuries to these areas. She is Right-handed. She has received no recent medical care. Pertinent negatives include no chest pain, no nausea and no weakness.     Review of patient's allergies indicates:   Allergen Reactions    Pineapple Hives and Shortness Of Breath     Past Medical History:   Diagnosis Date    Hyperlipidemia     Hypertension      Past Surgical History:   Procedure Laterality Date    APPENDECTOMY       SECTION  2018     SECTION N/A 2024    Procedure:  SECTION;  Surgeon: Harris Alexander MD;  [facility]  Service: OB/GYN;  Laterality: N/A;     Family History   Problem Relation Name Age of Onset    Diabetes Maternal Grandmother      Diabetes Maternal Grandfather      No Known Problems Father       No Known Problems Mother       Social History[1]  Review of Systems   Constitutional:  Negative for fever.   HENT:  Negative for sore throat.    Respiratory:  Negative for shortness of breath.    Cardiovascular:  Negative for chest pain.   Gastrointestinal:  Negative for nausea.   Genitourinary:  Negative for dysuria.   Musculoskeletal:  Positive for arthralgias. Negative for back pain and joint swelling.   Skin:  Negative for rash.   Neurological:  Negative for weakness.   Hematological:  Does not bruise/bleed easily.   All other systems reviewed and are negative.      Physical Exam     Initial Vitals [02/20/25 1147]   BP Pulse Resp Temp SpO2   (!) 170/116 85 20 98.7 °F (37.1 °C) 100 %      MAP       --         Physical Exam    Nursing note and vitals reviewed.  Constitutional: She appears well-developed and well-nourished.   HENT:   Head: Normocephalic.   Right Ear: Hearing and tympanic membrane normal.   Left Ear: Hearing and tympanic membrane normal. Mouth/Throat: Uvula is midline, oropharynx is clear and moist and mucous membranes are normal.   Eyes: Conjunctivae and EOM are normal. Pupils are equal, round, and reactive to light.   Cardiovascular:  Normal rate, regular rhythm, normal heart sounds and normal pulses.           Pulmonary/Chest: Effort normal and breath sounds normal.   Musculoskeletal:      Right forearm: Tenderness present. No swelling.      Left forearm: Normal.      Right hand: Bony tenderness present. Normal range of motion. Normal pulse.      Left hand: Normal.      Comments: All other adjacent joints normal      Neurological: She is alert. GCS eye subscore is 4. GCS verbal subscore is 5. GCS motor subscore is 6.   Skin: Skin is warm, dry and intact. Capillary refill takes less than 2 seconds.         ED Course   Procedures  Labs Reviewed - No data to display       Imaging Results              X-Ray Wrist Complete Right (Final result)  Result time 02/20/25 13:38:42      Final result by  Alisa Villalobos MD (02/20/25 13:38:42)                   Impression:      No acute osseous abnormality.      Electronically signed by: Alisa Villalobos  Date:    02/20/2025  Time:    13:38               Narrative:    EXAMINATION:  XR WRIST COMPLETE 3 VIEWS RIGHT    CLINICAL HISTORY:  Pain, unspecified    TECHNIQUE:  PA, lateral, and oblique views of the right wrist were performed.    COMPARISON:  None.    FINDINGS:  No fracture. No dislocation.   Mild positive ulnar variance.    Regional soft tissues are normal.                                       X-Ray Forearm Right (Final result)  Result time 02/20/25 12:54:38      Final result by David Wlels MD (02/20/25 12:54:38)                   Impression:      No acute osseous abnormality.      Electronically signed by: David Wells  Date:    02/20/2025  Time:    12:54               Narrative:    EXAMINATION:  XR FOREARM RIGHT    CLINICAL HISTORY:  Pain, unspecified    COMPARISON:  None.    FINDINGS:  No acute displaced fractures or dislocations.    Joint spaces preserved.    No blastic or lytic lesions.    Soft tissues within normal limits.                                       Medications - No data to display  Medical Decision Making  The patient is a 24 y.o. female with a history of hypertension who presents to the Emergency Department with a chief complaint of right arm pain. Patient states she started with right wrist and right forearm pain that started after a box fell on her arm at work. Symptoms began today and have been constant since onset. Her pain is currently rated as a 7/10 in severity and described as aching with no radiation. Associated symptoms include nothing. Symptoms are aggravated with movement and there are no alleviating factors. The patient denies numbness or tingling to extremity. She reports taking nothing prior to arrival with no relief of symptoms. No other reported symptoms at this time.      Judging by the patient's chief  complaint and pertinent history, the patient has the following possible differential diagnoses, including but not limited to the following.  Some of these are deemed to be lower likelihood and some more likely based on my physical exam and history combined with possible lab work and/or imaging studies.   Please see the pertinent studies, and refer to the HPI.  Some of these diagnoses will take further evaluation to fully rule out, perhaps as an outpatient and the patient was encouraged to follow up when discharged for more comprehensive evaluation.    Forearm fracture, wrist sprain, wrist fracture     Problems Addressed:  Pain: acute illness or injury  Sprain of right wrist, initial encounter: acute illness or injury    Amount and/or Complexity of Data Reviewed  Radiology: ordered. Decision-making details documented in ED Course.    Risk  Prescription drug management.               ED Course as of 02/20/25 1426   Thu Feb 20, 2025   1306 X-Ray Forearm Right     Impression:     No acute osseous abnormality.   [LM]   1347 X-Ray Wrist Complete Right  Impression:     No acute osseous abnormality.   [LM]   1423 I discussed results in detail with patient including follow up. She is amendable to plan and ready for dc home.  [LM]      ED Course User Index  [LM] Amina Kirby NP                           Clinical Impression:  Final diagnoses:  [R52] Pain  [S63.501A] Sprain of right wrist, initial encounter (Primary)          ED Disposition Condition    Discharge Stable          ED Prescriptions       Medication Sig Dispense Start Date End Date Auth. Provider    traMADoL (ULTRAM) 50 mg tablet Take 1 tablet (50 mg total) by mouth every 6 (six) hours as needed for Pain. 12 tablet 2/20/2025 2/23/2025 Amina Kirby NP          Follow-up Information       Follow up With Specialties Details Why Contact Info    Primary care provider  Schedule an appointment as soon as possible for a visit                  [1]   Social  History  Tobacco Use    Smoking status: Never     Passive exposure: Current    Smokeless tobacco: Never   Substance Use Topics    Alcohol use: Not Currently    Drug use: Never        Amina Kirby NP  02/20/25 1421

## 2025-02-20 NOTE — FIRST PROVIDER EVALUATION
Medical screening examination initiated.  I have conducted a focused provider triage encounter, findings are as follows:    Brief history of present illness:  24 year old female presents to Er with c/o right forearm and wrist pain after a box fell on her wrist.    There were no vitals filed for this visit.    Pertinent physical exam:  awake and alert, nad    Brief workup plan:  imaging     Preliminary workup initiated; this workup will be continued and followed by the physician or advanced practice provider that is assigned to the patient when roomed.

## (undated) DEVICE — ELECTRODE REM PLYHSV RETURN 9